# Patient Record
Sex: MALE | Race: WHITE | NOT HISPANIC OR LATINO | Employment: OTHER
[De-identification: names, ages, dates, MRNs, and addresses within clinical notes are randomized per-mention and may not be internally consistent; named-entity substitution may affect disease eponyms.]

---

## 2023-06-20 ENCOUNTER — TRANSCRIBE ORDERS (OUTPATIENT)
Dept: OTHER | Age: 73
End: 2023-06-20

## 2023-06-20 DIAGNOSIS — C15.4 MALIGNANT NEOPLASM OF MIDDLE THIRD OF ESOPHAGUS (H): Primary | ICD-10-CM

## 2023-06-21 ENCOUNTER — PATIENT OUTREACH (OUTPATIENT)
Dept: SURGERY | Facility: CLINIC | Age: 73
End: 2023-06-21

## 2023-06-21 NOTE — PROGRESS NOTES
Review of Thoracic Surgery referral from  Onc Dr Gutierrez for pt with esophageal cancer.    Hx of esophageal adenocarcinoma on EGD at  5/17/23. Staging completed (PET 6/1, EUS 6/6); pt has met w/ Med Onc, CTS at .  team sending to Thoracic Surg at [facility] for 2nd op.    Dr King would like to add on this pt tomorrow 6/22/23; MR team to push images, request slides,etc.      Catrachito Daley RN  Oncology Nurse Navigator  [facility]  [phone]

## 2023-06-21 NOTE — TELEPHONE ENCOUNTER
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 Need or order pathology

## 2023-06-22 ENCOUNTER — ONCOLOGY VISIT (OUTPATIENT)
Dept: SURGERY | Facility: CLINIC | Age: 73
End: 2023-06-22
Attending: STUDENT IN AN ORGANIZED HEALTH CARE EDUCATION/TRAINING PROGRAM
Payer: COMMERCIAL

## 2023-06-22 ENCOUNTER — PRE VISIT (OUTPATIENT)
Dept: SURGERY | Facility: CLINIC | Age: 73
End: 2023-06-22
Payer: COMMERCIAL

## 2023-06-22 VITALS
HEIGHT: 72 IN | TEMPERATURE: 98.3 F | RESPIRATION RATE: 16 BRPM | SYSTOLIC BLOOD PRESSURE: 133 MMHG | DIASTOLIC BLOOD PRESSURE: 80 MMHG | HEART RATE: 60 BPM | OXYGEN SATURATION: 99 % | WEIGHT: 243.6 LBS | BODY MASS INDEX: 33 KG/M2

## 2023-06-22 DIAGNOSIS — C15.4 MALIGNANT NEOPLASM OF MIDDLE THIRD OF ESOPHAGUS (H): ICD-10-CM

## 2023-06-22 PROCEDURE — G0463 HOSPITAL OUTPT CLINIC VISIT: HCPCS | Performed by: STUDENT IN AN ORGANIZED HEALTH CARE EDUCATION/TRAINING PROGRAM

## 2023-06-22 PROCEDURE — 99205 OFFICE O/P NEW HI 60 MIN: CPT | Performed by: STUDENT IN AN ORGANIZED HEALTH CARE EDUCATION/TRAINING PROGRAM

## 2023-06-22 RX ORDER — SIMVASTATIN 80 MG
80 TABLET ORAL AT BEDTIME
COMMUNITY
Start: 2023-06-08

## 2023-06-22 RX ORDER — MULTIPLE VITAMINS W/ MINERALS TAB 9MG-400MCG
1 TAB ORAL EVERY EVENING
Status: ON HOLD | COMMUNITY
End: 2023-11-01

## 2023-06-22 RX ORDER — COVID-19 MOLECULAR TEST ASSAY
KIT MISCELLANEOUS
Status: ON HOLD | COMMUNITY
Start: 2022-11-14 | End: 2023-10-23

## 2023-06-22 RX ORDER — DOCUSATE SODIUM 250 MG
250 CAPSULE ORAL 2 TIMES DAILY
COMMUNITY

## 2023-06-22 RX ORDER — TRIAMCINOLONE ACETONIDE 1 MG/G
CREAM TOPICAL PRN
Status: ON HOLD | COMMUNITY
Start: 2023-02-16 | End: 2023-10-24

## 2023-06-22 ASSESSMENT — PAIN SCALES - GENERAL: PAINLEVEL: NO PAIN (0)

## 2023-06-22 NOTE — NURSING NOTE
"Oncology Rooming Note    June 22, 2023 10:01 AM   Alonso Rogel is a 72 year old male who presents for:    Chief Complaint   Patient presents with     Oncology Clinic Visit     New patient - malignant neoplasm of middle third of esophagus, referral from Dr. Gutierrez.      Esophageal Cancer     Initial Vitals: /80 (BP Location: Right arm, Patient Position: Sitting, Cuff Size: Adult Regular)   Pulse 60   Temp 98.3  F (36.8  C) (Oral)   Resp 16   Ht 1.825 m (5' 11.85\")   Wt 110.5 kg (243 lb 9.6 oz)   SpO2 99%   BMI 33.18 kg/m   Estimated body mass index is 33.18 kg/m  as calculated from the following:    Height as of this encounter: 1.825 m (5' 11.85\").    Weight as of this encounter: 110.5 kg (243 lb 9.6 oz). Body surface area is 2.37 meters squared.  No Pain (0) Comment: Data Unavailable   No LMP for male patient.  Allergies reviewed: Yes  Medications reviewed: Yes    Medications: Medication refills not needed today.  Pharmacy name entered into [employer]: [id number] - [place], [place] - [address] [address] AT [facility] OF  [address] [place] [address]    Clinical concerns:       Talat Crouch            "

## 2023-06-22 NOTE — PROGRESS NOTES
THORACIC SURGERY - NEW PATIENT OFFICE VISIT          I saw Alonso Rogel at in consultation for the evaluation and treatment of a newly diagnosed esophageal cancer.     HPI  Alonso Rogel is a 72 year old male who was recently diagnosed with esophageal cancer when he presented with food impaction.  He has lost a few pounds but is tolerating solid food and has been quite active.    Previsit Tests   2023: EGD: Partially obstructing, likely malignant esophageal tumor (vs. inflamed stricture, less likely was found in the middle third of the esophagus. Biopsied.- A medium amount of food (residue)  in the stomach - Normal examined duodenum.  2023: Pathology: Esophagus, biopsy: Moderate to poorly differentiated adenocarcinoma: HER2 fish negative.    2023: PET imagin. Hypermetabolic activity within the mid esophagus compatible with known malignancy.2. No convincing scintigraphic evidence for metastatic disease.3. Note is made of 2 subcentimeter anterior paraesophageal lymph node superior to the area of hypermetabolic activity which are slightly more prominent compared to prior CT but are not frankly hypermetabolic  2023: Endoscopic ultrasound: Middle 3rd esophageal cancer, T3N1/N0.  Unable to biopsy since needed we will need to passed through the tumor  PMH  Reviewed, as below    Ca prostate  Ca thyroid      PSH  Reviewed, as below       ETOH beer on most days (1)  TOB denies     Physical examination  BMI 33      From a personal perspective, he is here with his wife    IMPRESSION (C15.4) Malignant neoplasm of middle third of esophagus (H)  Comment:   Plan:      This person is a 72 year old male with Siewert I esopahgeal ca T3N1M0     PLAN  I spent 60 min on the date of the encounter in chart review, patient visit, review of tests, documentation and/or discussion with other providers about the issues documented above. I reviewed the plan as follows:  We discussed the role of trimodality  treatment for esophageal cancer  Idiscussed  A minimally invasive esopagectomy (gabriel-Reagan) possible 3 hole (in case proximal margins were close). However, based on his scan, hernia and measurements, an Gabriel-Reagan esophagectomy will likely be appropriate for him.  We discussed a staged procedure with diagnostic lap, J tube and egd 3 weeks prior to the esophagectomy    If he decided to pursue care here, I will see him in 1 month after chemoXRT with a new PET scan.    We will also discuss him at our tumor board      I appreciate the opportunity to participate in the care of your patient and will keep you updated.  Sincerely,    Jennifer King MD

## 2023-06-22 NOTE — LETTER
2023         RE: Alonso Rogel  [address] [address] [address]        Dear Colleague,    Thank you for referring your patient, Alonso Rogel, to the [facility]. Please see a copy of my visit note below.    THORACIC SURGERY - NEW PATIENT OFFICE VISIT          I saw Alonso Rogel at in consultation for the evaluation and treatment of a newly diagnosed esophageal cancer.     HPI  Alonso Rogel is a 72 year old male who was recently diagnosed with esophageal cancer when he presented with food impaction.  He has lost a few pounds but is tolerating solid food and has been quite active.    Previsit Tests   2023: EGD: Partially obstructing, likely malignant esophageal tumor (vs. inflamed stricture, less likely was found in the middle third of the esophagus. Biopsied.- A medium amount of food (residue)  in the stomach - Normal examined duodenum.  2023: Pathology: Esophagus, biopsy: Moderate to poorly differentiated adenocarcinoma: HER2 fish negative.    2023: PET imagin. Hypermetabolic activity within the mid esophagus compatible with known malignancy.2. No convincing scintigraphic evidence for metastatic disease.3. Note is made of 2 subcentimeter anterior paraesophageal lymph node superior to the area of hypermetabolic activity which are slightly more prominent compared to prior CT but are not frankly hypermetabolic  2023: Endoscopic ultrasound: Middle 3rd esophageal cancer, T3N1/N0.  Unable to biopsy since needed we will need to passed through the tumor  PMH  Reviewed, as below    Ca prostate  Ca thyroid      PSH  Reviewed, as below       ETOH beer on most days (1)  TOB denies     Physical examination  BMI 33      From a personal perspective, he is here with his wife    IMPRESSION (C15.4) Malignant neoplasm of middle third of esophagus (H)  Comment:   Plan:      This person is a 72 year old male with Siewert I esopahgeal ca T3N1M0     PLAN  I  spent 60 min on the date of the encounter in chart review, patient visit, review of tests, documentation and/or discussion with other providers about the issues documented above. I reviewed the plan as follows:  We discussed the role of trimodality treatment for esophageal cancer  Idiscussed  A minimally invasive esopagectomy (gabriel-Reagan) possible 3 hole (in case proximal margins were close). However, based on his scan, hernia and measurements, an Gabriel-Reagan esophagectomy will likely be appropriate for him.  We discussed a staged procedure with diagnostic lap, J tube and egd 3 weeks prior to the esophagectomy    If he decided to pursue care here, I will see him in 1 month after chemoXRT with a new PET scan.    We will also discuss him at our tumor board      I appreciate the opportunity to participate in the care of your patient and will keep you updated.  Sincerely,    Jennifer King MD

## 2023-06-23 ENCOUNTER — DOCUMENTATION ONLY (OUTPATIENT)
Dept: SURGERY | Facility: CLINIC | Age: 73
End: 2023-06-23
Payer: COMMERCIAL

## 2023-06-28 NOTE — PROGRESS NOTES
Spoke with RNCC from Dr. Gutierrez's office who will update her on plan and schedule PET at [facility] for the first week of September. Verified that treatment is scheduled to be finished on 8/4. She will request that images be automatically pushed into PACS.     Jessica Strickland RN BSN  Thoracic Surgery RN Care Coordinator  [phone]

## 2023-08-13 ENCOUNTER — HEALTH MAINTENANCE LETTER (OUTPATIENT)
Age: 73
End: 2023-08-13

## 2023-09-18 ENCOUNTER — ONCOLOGY VISIT (OUTPATIENT)
Dept: SURGERY | Facility: CLINIC | Age: 73
End: 2023-09-18
Attending: STUDENT IN AN ORGANIZED HEALTH CARE EDUCATION/TRAINING PROGRAM
Payer: COMMERCIAL

## 2023-09-18 VITALS
DIASTOLIC BLOOD PRESSURE: 77 MMHG | RESPIRATION RATE: 16 BRPM | TEMPERATURE: 98.5 F | SYSTOLIC BLOOD PRESSURE: 144 MMHG | OXYGEN SATURATION: 99 % | BODY MASS INDEX: 32.17 KG/M2 | WEIGHT: 236.2 LBS | HEART RATE: 63 BPM

## 2023-09-18 DIAGNOSIS — C15.4 MALIGNANT NEOPLASM OF MIDDLE THIRD OF ESOPHAGUS (H): Primary | ICD-10-CM

## 2023-09-18 PROCEDURE — G0463 HOSPITAL OUTPT CLINIC VISIT: HCPCS | Performed by: STUDENT IN AN ORGANIZED HEALTH CARE EDUCATION/TRAINING PROGRAM

## 2023-09-18 PROCEDURE — 99215 OFFICE O/P EST HI 40 MIN: CPT | Performed by: STUDENT IN AN ORGANIZED HEALTH CARE EDUCATION/TRAINING PROGRAM

## 2023-09-18 ASSESSMENT — PAIN SCALES - GENERAL: PAINLEVEL: NO PAIN (0)

## 2023-09-18 NOTE — PROGRESS NOTES
THORACIC SURGERY - FOLLOW UP  OFFICE VISIT        I saw Alonso Rogel at in consultation for the evaluation and treatment of esophageal cancer.     HPI  Alonso Rogel is a 72 year old male who was diagnosed with esophageal cancer in [place] when he presented with food impaction. He returns today after chemoRT (carboplatin, paclitaxel, external beam radiotherapy to the esophagus delivering 4140 cGy at 180 cGy per fraction over 23 fractions) and with a new PET scan. (Completed 23)  Reports he had minimal issues with his chemo and radiation. Had no nausea, hair loss, or further weight loss (only had 20 lbs total but none in the last few weeks). Has been eating and drinking okay, denies dysphagia. His main symptom is increased fatigue by the end of the day but able to still stay active.   Previsit Tests   2023: EGD: Partially obstructing, likely malignant esophageal tumor (vs. inflamed stricture, less likely was found in the middle third of the esophagus. Biopsied.- A medium amount of food (residue)  in the stomach - Normal examined duodenum.  2023: Pathology: Esophagus, biopsy: Moderate to poorly differentiated adenocarcinoma: HER2 fish negative.  2023: PET imagin. Hypermetabolic activity within the mid esophagus compatible with known malignancy.2. No convincing scintigraphic evidence for metastatic disease.3. Note is made of 2 subcentimeter anterior paraesophageal lymph node superior to the area of hypermetabolic activity which are slightly more prominent compared to prior CT but are not frankly hypermetabolic  2023: Endoscopic ultrasound: Middle 3rd esophageal cancer, T3N1/N0.  Unable to biopsy since needed we will need to passed through the tumor  23 PET imaging: stable PET without major progression of disease, current SUV max 10.7 but previously 10.2.     [facility]  Reviewed, as below    Dyslipidemia  Papillary thyroid carcinoma 2022  Prostate cancer  2013  Dyslipidemia  Sensorineural hearing loss   Neuropathy pain      PSH  Appendectomy, hemithyroidectomy, prostate biopsy, robotic prostatectomy.     SH  Reviewed, as below       ETOH beer on most days (1)  TOB denies     Physical examination  BMI 33      From a personal perspective, he is here with his wife    IMPRESSION (C15.4) Malignant neoplasm of middle third of esophagus (H)  Comment:   Plan:      This person is a 72 year old male with Siewert I esopahgeal ca T3N1M0     PLAN  I spent 60 min on the date of the encounter in chart review, patient visit, review of tests, documentation and/or discussion with other providers about the issues documented above. I reviewed the plan as follows:  Plan for staged procedure with diagnostic lap, J tube and egd first followed by minimally invasive esophagectomy (ideally [place]-Reagan, however possibility for 3 hole if proximal margins close/Araiza's present). He would like to proceed with this as soon as possible; will work on getting scheduled at least 6-8 weeks post chemo/radiation.   We discussed that the PET activity could mean reaction vs residual disease. The diagonostic lap and another PET closer to the esophagectomy will   Procedure plan: I reviewed the indications, risks, and benefits of the procedure with Mr. Alonso Rogel . We discussed the intraoperative risks of bleeding, injury to vital organs, and potential for esophageal discontinuity.  Potential postoperative complications include, but are not limited to, major respiratory events, arrhythmia, bleeding, infection, reoperation, anastomotic leak, conduit necrosis with discontinuity, vocal cord palsy, and death. I explained the anticipated hospital course (10-14 days) and postoperative recovery including pain control, tube feedings, dietary changes, and overall drain management. We discussed the importance of lifetime awareness to limit lifting heavy weights to prevent hiatal herniation as well as the need for  aspiration precautions.    1. Necessary Tests & Appointments:  PAC, PFTs  2. Pain Control Plan: local at port sites and intercostal block  3. Anticoagulation Plan: Lovenox 40mg in preop  4. Smoking Cessation: nonsmoker    All questions were answered and the patient and present family were in agreement with the plan.  I appreciate the opportunity to participate in the care of your patient and will keep you updated.  Sincerely,

## 2023-09-18 NOTE — NURSING NOTE
"Oncology Rooming Note    September 18, 2023 2:03 PM   Alonso Rogel is a 72 year old male who presents for:    Chief Complaint   Patient presents with    Oncology Clinic Visit     Esophageal Cancer     Initial Vitals: BP (!) 144/77 (BP Location: Right arm, Patient Position: Sitting, Cuff Size: Adult Large)   Pulse 63   Temp 98.5  F (36.9  C) (Oral)   Resp 16   Wt 107.1 kg (236 lb 3.2 oz)   SpO2 99%   BMI 32.17 kg/m   Estimated body mass index is 32.17 kg/m  as calculated from the following:    Height as of 6/22/23: 1.825 m (5' 11.85\").    Weight as of this encounter: 107.1 kg (236 lb 3.2 oz). Body surface area is 2.33 meters squared.  No Pain (0) Comment: Data Unavailable   No LMP for male patient.  Allergies reviewed: Yes  Medications reviewed: Yes    Medications: Medication refills not needed today.  Pharmacy name entered into [employer]: [id number] - [place], [place] - [address] AT [facility] OF [facility] [address]    Clinical concerns: PET Scan review       Cordelia Couch LPN  9/18/2023              "

## 2023-09-18 NOTE — LETTER
2023         RE: Alonso Rogel  [address] [address] [address]        Dear Colleague,    Thank you for referring your patient, Alonso Rogel, to the [facility]. Please see a copy of my visit note below.    THORACIC SURGERY - FOLLOW UP  OFFICE VISIT        I saw Alonso Rogel at in consultation for the evaluation and treatment of esophageal cancer.     HPI  Alonso Rogel is a 72 year old male who was diagnosed with esophageal cancer in [place] when he presented with food impaction. He returns today after chemoRT (carboplatin, paclitaxel, external beam radiotherapy to the esophagus delivering 4140 cGy at 180 cGy per fraction over 23 fractions) and with a new PET scan. (Completed 23)  Reports he had minimal issues with his chemo and radiation. Had no nausea, hair loss, or further weight loss (only had 20 lbs total but none in the last few weeks). Has been eating and drinking okay, denies dysphagia. His main symptom is increased fatigue by the end of the day but able to still stay active.   Previsit Tests   2023: EGD: Partially obstructing, likely malignant esophageal tumor (vs. inflamed stricture, less likely was found in the middle third of the esophagus. Biopsied.- A medium amount of food (residue)  in the stomach - Normal examined duodenum.  2023: Pathology: Esophagus, biopsy: Moderate to poorly differentiated adenocarcinoma: HER2 fish negative.  2023: PET imagin. Hypermetabolic activity within the mid esophagus compatible with known malignancy.2. No convincing scintigraphic evidence for metastatic disease.3. Note is made of 2 subcentimeter anterior paraesophageal lymph node superior to the area of hypermetabolic activity which are slightly more prominent compared to prior CT but are not frankly hypermetabolic  2023: Endoscopic ultrasound: Middle 3rd esophageal cancer, T3N1/N0.  Unable to biopsy since needed we will need to passed  through the tumor  9/5/23 PET imaging: stable PET without major progression of disease, current SUV max 10.7 but previously 10.2.     PMH  Reviewed, as below    Dyslipidemia  Papillary thyroid carcinoma 2022 November  Prostate cancer 2013  Dyslipidemia  Sensorineural hearing loss   Neuropathy pain      PSH  Appendectomy, hemithyroidectomy, prostate biopsy, robotic prostatectomy.     SH  Reviewed, as below       ETOH beer on most days (1)  TOB denies     Physical examination  BMI 33      From a personal perspective, he is here with his wife    IMPRESSION (C15.4) Malignant neoplasm of middle third of esophagus (H)  Comment:   Plan:      This person is a 72 year old male with Siewert I esopahgeal ca T3N1M0     PLAN  I spent 60 min on the date of the encounter in chart review, patient visit, review of tests, documentation and/or discussion with other providers about the issues documented above. I reviewed the plan as follows:  Plan for staged procedure with diagnostic lap, J tube and egd first followed by minimally invasive esophagectomy (ideally [place]-Reagan, however possibility for 3 hole if proximal margins close/Araiza's present). He would like to proceed with this as soon as possible; will work on getting scheduled at least 6-8 weeks post chemo/radiation.   We discussed that the PET activity could mean reaction vs residual disease. The diagonostic lap and another PET closer to the esophagectomy will   Procedure plan: I reviewed the indications, risks, and benefits of the procedure with Mr. Alonso Rogel . We discussed the intraoperative risks of bleeding, injury to vital organs, and potential for esophageal discontinuity.  Potential postoperative complications include, but are not limited to, major respiratory events, arrhythmia, bleeding, infection, reoperation, anastomotic leak, conduit necrosis with discontinuity, vocal cord palsy, and death. I explained the anticipated hospital course (10-14 days) and  postoperative recovery including pain control, tube feedings, dietary changes, and overall drain management. We discussed the importance of lifetime awareness to limit lifting heavy weights to prevent hiatal herniation as well as the need for aspiration precautions.    1. Necessary Tests & Appointments:  PAC, PFTs  2. Pain Control Plan: local at port sites and intercostal block  3. Anticoagulation Plan: Lovenox 40mg in preop  4. Smoking Cessation: nonsmoker    All questions were answered and the patient and present family were in agreement with the plan.  I appreciate the opportunity to participate in the care of your patient and will keep you updated.  Sincerely,    Jennfier King MD

## 2023-09-19 DIAGNOSIS — C15.4 MALIGNANT NEOPLASM OF MIDDLE THIRD OF ESOPHAGUS (H): Primary | ICD-10-CM

## 2023-09-20 ENCOUNTER — TELEPHONE (OUTPATIENT)
Dept: SURGERY | Facility: CLINIC | Age: 73
End: 2023-09-20
Payer: COMMERCIAL

## 2023-09-20 NOTE — TELEPHONE ENCOUNTER
Pt called with questions about insurance coverage for esophagectomy. Phone number give to [place] [facility] care center. Advised pt to wait until procedure is scheduled, then follow up with them for financial estimates.    Pt asking if it's ok to get flu and Covid vaccines, advised that standard practice is to wait two weeks before and after surgery for any vaccine.     Jessica Strickland, RN BSN  Thoracic Surgery RN Care Coordinator  [phone]

## 2023-09-21 ENCOUNTER — TELEPHONE (OUTPATIENT)
Dept: SURGERY | Facility: CLINIC | Age: 73
End: 2023-09-21
Payer: COMMERCIAL

## 2023-09-21 ENCOUNTER — PREP FOR PROCEDURE (OUTPATIENT)
Dept: SURGERY | Facility: CLINIC | Age: 73
End: 2023-09-21
Payer: COMMERCIAL

## 2023-09-21 NOTE — TELEPHONE ENCOUNTER
Spoke with patient to schedule procedure with Dr. sylvester   Procedure was scheduled on 10/2 & 10/23 at [facility] OR  Patient will have H&P with PAC    Patient is aware a COVID-19 test is needed before their procedure ONLY IF symptomatic.   (Patient is aware Thoracic is no longer requiring COVID-19 test)       Patient is aware a / is needed day of surgery.   Surgery Letter was sent via [employer],     Patient has my direct contact information for any further questions.

## 2023-09-22 NOTE — TELEPHONE ENCOUNTER
FUTURE VISIT INFORMATION        SURGERY INFORMATION:  Date: 10/2/23  Location: [facility] or  Surgeon:  Jennifer King MD   Anesthesia Type:  general  Procedure: Diagnostic Laparoscopy Esophagoscopy, gastroscopy, duodenoscopy (EGD) INSERTION, JEJUNOSTOMY TUBE, LAPAROSCOPY-ASSISTED   Consult: ov 23     RECORDS REQUESTED FROM:         Primary Care Provider: Say Rodriguez MD  - [facility]     Most recent EKG+ Tracin23- [facility]

## 2023-09-22 NOTE — TELEPHONE ENCOUNTER
FUTURE VISIT INFORMATION      SURGERY INFORMATION:  Date: 10/2/23  Location: [facility] or  Surgeon:  Jennifer King MD   Anesthesia Type:  general  Procedure: Diagnostic Laparoscopy Esophagoscopy, gastroscopy, duodenoscopy (EGD) INSERTION, JEJUNOSTOMY TUBE, LAPAROSCOPY-ASSISTED   Consult: ov 23    RECORDS REQUESTED FROM:       Primary Care Provider: Say Rodriguez MD  - [facility]    Most recent EKG+ Tracin23- [facility]

## 2023-09-25 ENCOUNTER — MYC MEDICAL ADVICE (OUTPATIENT)
Dept: SURGERY | Facility: CLINIC | Age: 73
End: 2023-09-25
Payer: COMMERCIAL

## 2023-09-25 DIAGNOSIS — C15.9 MALIGNANT NEOPLASM OF ESOPHAGUS, UNSPECIFIED LOCATION (H): Primary | ICD-10-CM

## 2023-09-28 ENCOUNTER — OFFICE VISIT (OUTPATIENT)
Dept: EDUCATION SERVICES | Facility: CLINIC | Age: 73
End: 2023-09-28
Attending: CLINICAL NURSE SPECIALIST
Payer: COMMERCIAL

## 2023-09-28 ENCOUNTER — OFFICE VISIT (OUTPATIENT)
Dept: SURGERY | Facility: CLINIC | Age: 73
End: 2023-09-28
Payer: COMMERCIAL

## 2023-09-28 ENCOUNTER — OFFICE VISIT (OUTPATIENT)
Dept: PULMONOLOGY | Facility: CLINIC | Age: 73
End: 2023-09-28
Attending: STUDENT IN AN ORGANIZED HEALTH CARE EDUCATION/TRAINING PROGRAM
Payer: COMMERCIAL

## 2023-09-28 ENCOUNTER — PRE VISIT (OUTPATIENT)
Dept: SURGERY | Facility: CLINIC | Age: 73
End: 2023-09-28

## 2023-09-28 ENCOUNTER — ANESTHESIA EVENT (OUTPATIENT)
Dept: SURGERY | Facility: CLINIC | Age: 73
End: 2023-09-28
Payer: COMMERCIAL

## 2023-09-28 VITALS
TEMPERATURE: 97.5 F | BODY MASS INDEX: 32.06 KG/M2 | RESPIRATION RATE: 16 BRPM | SYSTOLIC BLOOD PRESSURE: 129 MMHG | OXYGEN SATURATION: 100 % | HEIGHT: 72 IN | DIASTOLIC BLOOD PRESSURE: 87 MMHG | WEIGHT: 236.7 LBS | HEART RATE: 62 BPM

## 2023-09-28 DIAGNOSIS — Z01.818 PRE-OP EVALUATION: Primary | ICD-10-CM

## 2023-09-28 DIAGNOSIS — C15.9 MALIGNANT NEOPLASM OF ESOPHAGUS, UNSPECIFIED LOCATION (H): ICD-10-CM

## 2023-09-28 DIAGNOSIS — C15.4 MALIGNANT NEOPLASM OF MIDDLE THIRD OF ESOPHAGUS (H): ICD-10-CM

## 2023-09-28 PROCEDURE — 94375 RESPIRATORY FLOW VOLUME LOOP: CPT | Performed by: INTERNAL MEDICINE

## 2023-09-28 PROCEDURE — 99204 OFFICE O/P NEW MOD 45 MIN: CPT | Performed by: PHYSICIAN ASSISTANT

## 2023-09-28 PROCEDURE — 94729 DIFFUSING CAPACITY: CPT | Performed by: INTERNAL MEDICINE

## 2023-09-28 PROCEDURE — 94726 PLETHYSMOGRAPHY LUNG VOLUMES: CPT | Performed by: INTERNAL MEDICINE

## 2023-09-28 RX ORDER — SENNOSIDES 8.6 MG
1 TABLET ORAL 2 TIMES DAILY
Status: ON HOLD | COMMUNITY
End: 2023-10-24

## 2023-09-28 ASSESSMENT — LIFESTYLE VARIABLES: TOBACCO_USE: 0

## 2023-09-28 ASSESSMENT — ENCOUNTER SYMPTOMS: SEIZURES: 0

## 2023-09-28 ASSESSMENT — PAIN SCALES - GENERAL: PAINLEVEL: NO PAIN (0)

## 2023-09-28 ASSESSMENT — COPD QUESTIONNAIRES: COPD: 0

## 2023-09-28 NOTE — H&P
Pre-Operative H & P     CC:  Preoperative exam to assess for increased cardiopulmonary risk while undergoing surgery and anesthesia.    Date of Encounter: 9/28/2023  Primary Care Physician:  Say Rodriguez     Reason for visit:   Encounter Diagnoses   Name Primary?    Pre-op evaluation Yes    Malignant neoplasm of esophagus, unspecified location (H)        HPI  Alonso Rogel is a 72 year old male who presents for pre-operative H & P in preparation for  Procedure Information       Case: [id number] Date/Time: 10/02/23 1040    Procedures:       Diagnostic Laparoscopy (Chest)      Esophagoscopy, gastroscopy, duodenoscopy (EGD) (Esophagus)      INSERTION, JEJUNOSTOMY TUBE, LAPAROSCOPY-ASSISTED (Abdomen)    Anesthesia type: General    Diagnosis: Malignant neoplasm of esophagus, unspecified location (H) [C15.9]    Pre-op diagnosis: Malignant neoplasm of esophagus, unspecified location (H) [C15.9]    [facility]: [facility]    Providers: Jennifer King MD            Patient is being evaluated for comorbid conditions of hyperlipidemia, anemia, SNHL, neuropathy, history of thyroid cancer, history of prostate cancer.    He was diagnosed with esophageal cancer in May after presenting with food impaction. He is s/p chemoradiation and was recently seen in consultation with Dr. King on 9/18/2023 to discuss surgical treatment options. A staged approach was recommended and he is now scheduled for the first stage as above.     Of note, he is scheduled for minimally invasive esophagectomy later in October. He does have an additional PAC appointment prior to that surgery as well.     History is obtained from the patient and chart review    Hx of abnormal bleeding or anti-platelet use: None      Past Medical History  Past Medical History:   Diagnosis Date    Anemia     Esophageal cancer (H)     History of prostate cancer     History of thyroid cancer     HLD (hyperlipidemia)     Neuropathy     SNHL (sensorineural hearing loss)   "      Past Surgical History  Past Surgical History:   Procedure Laterality Date    APPENDECTOMY      CRANIOTOMY      PROSTATECTOMY      THYROIDECTOMY, PARTIAL Left        Prior to Admission Medications  Current Outpatient Medications   Medication Sig Dispense Refill    docusate sodium (COLACE) 250 MG capsule Take 250 mg by mouth 2 times daily      multivitamin w/minerals (THERA-VIT-M) tablet Take 1 tablet by mouth every evening      sennosides (SENOKOT) 8.6 MG tablet Take 1 tablet by mouth 2 times daily      simvastatin (ZOCOR) 80 MG tablet Take 80 mg by mouth At Bedtime      triamcinolone (KENALOG) 0.1 % external cream Apply topically as needed      BINAXNOW COVID-19 AG HOME TEST KIT TEST AS DIRECTED TODAY         Allergies  Allergies   Allergen Reactions    Penicillins Rash     PN: LW Reaction: Reaction as a child        Social History  Social History     Socioeconomic History    Marital status:      Spouse name: Not on file    Number of children: Not on file    Years of education: Not on file    Highest education level: Not on file   Occupational History    Not on file   Tobacco Use    Smoking status: Never     Passive exposure: Never    Smokeless tobacco: Never   Substance and Sexual Activity    Alcohol use: Not Currently     Alcohol/week: 5.0 - 6.0 standard drinks of alcohol     Types: 5 - 6 Standard drinks or equivalent per week     Comment: None since 6/23    Drug use: Never    Sexual activity: Not on file   Other Topics Concern    Not on file   Social History Narrative    Not on file     Social Determinants of Health     Financial Resource Strain: Not on file   Food Insecurity: Not on file   Transportation Needs: Not on file   Physical Activity: Not on file   Stress: Not on file   Social Connections: Not on file   Interpersonal Safety: Not on file   Housing Stability: Not on file       Family History  Family History   Problem Relation Age of Onset    Other - See Comments Mother         \"difficulty " "with opioids\" - severe nausea    Breast Cancer Mother     Myocardial Infarction Father 60    Kidney failure Father 66    Prostate Cancer Father 66    Breast Cancer Sister     Anesthesia Reaction No family hx of     Venous thrombosis No family hx of        Review of Systems  The complete review of systems is negative other than noted in the HPI or here.   Anesthesia Evaluation   Pt has had prior anesthetic.     History of anesthetic complications  - difficult airway.      ROS/MED HX  ENT/Pulmonary: Comment: SNHL   (-) tobacco use, asthma and COPD   Neurologic:     (+)    peripheral neuropathy, - mild, right hand.                        (-) no seizures, no CVA and no TIA   Cardiovascular: Comment: Denies chest pain/pressure, CLIFTON, orthopnea, dizziness, palpitations, edema.      (+) Dyslipidemia - -   -  - -                                 Previous cardiac testing   Echo: Date: Results:    Stress Test:  Date: 2002 Results:  CONCLUSION:    1.  Normal exercise echocardiogram with adequate heart rate and workload.   2.  No evidence for inducible ischemia.   3.  Image quality was poor.   4.  This test has diminished sensitivity due to poor imaging quality.     ECG Reviewed:  Date: 5/17/2022 Results:  Sinus rhythm   Normal ECG   When compared with ECG of 16-NOV-2022 16:16,   Premature ventricular complexes are no longer Present   Cath:  Date: Results:      METS/Exercise Tolerance: >4 METS Comment: Walks daily, cares for [place] and does all yard work.    Hematologic:     (+)      anemia,       (-) history of blood clots and history of blood transfusion   Musculoskeletal:  - neg musculoskeletal ROS     GI/Hepatic:     (+)   esophageal disease,              (-) GERD and liver disease   Renal/Genitourinary:  - neg Renal ROS     Endo: Comment: S/p partial thyroidectomy    (+)               Obesity,    (-) Type II DM   Psychiatric/Substance Use:  - neg psychiatric ROS     Infectious Disease:  - neg infectious disease ROS   " "  Malignancy:   (+) Malignancy, History of GI, Prostate and Other.Prostate CA Remission status post Surgery.  GI CA  Active status post.  Other CA Thyroid cancer Remission status post Surgery.    Other:  - neg other ROS          /87 (BP Location: Right arm, Patient Position: Sitting, Cuff Size: Adult Regular)   Pulse 62   Temp 97.5  F (36.4  C) (Oral)   Resp 16   Ht 1.825 m (5' 11.85\")   Wt 107.4 kg (236 lb 11.2 oz)   SpO2 100%   BMI 32.24 kg/m      Physical Exam   Constitutional: Pleasant, well-appearing male, no apparent distress, and appears stated age.  Eyes: Pupils equal, round and reactive to light, extra ocular muscles intact, sclera clear, conjunctiva normal.  HENT: Normocephalic and atraumatic, oral pharynx with moist mucus membranes, good dentition. No goiter appreciated.   Respiratory: Clear to auscultation bilaterally, no crackles or wheezing.  Cardiovascular: Regular rate and rhythm, normal S1 and S2, and no murmur noted.  Carotids +2, no bruits. No edema. Palpable pulses to radial  DP and PT arteries.   GI: Normal bowel sounds, soft, non-distended, non-tender, no masses palpated, no hepatosplenomegaly.  Lymph/Hematologic: No cervical lymphadenopathy and no supraclavicular lymphadenopathy.  Genitourinary:  Deferred  Skin: Warm and dry.  No rashes on exposed skin   Musculoskeletal: Full ROM of neck. There is no redness, warmth, or swelling of the visible joints. Gross motor strength is normal.    Neurologic: Awake, alert, oriented to name, place and time. Cranial nerves II-XII are grossly intact. Gait is normal.   Neuropsychiatric: Calm, cooperative. Normal affect.       Prior Labs/Diagnostic Studies   All labs and imaging personally reviewed     EKG/ stress test - if available please see in ROS above   No results found.    The patient's records and results personally reviewed by this provider.     Outside records reviewed from: Care Everywhere    LAB/DIAGNOSTIC STUDIES TODAY:  " None    Assessment  Alonso Rogel is a 72 year old male seen as a PAC referral for risk assessment and optimization for anesthesia.    Plan/Recommendations  Pt will be optimized for the proposed procedure.  See below for details on the assessment, risk, and preoperative recommendations    NEUROLOGY  - No history of TIA, CVA or seizure  - Neuropathy affecting right hand, mild    -Post Op delirium risk factors:  Age    ENT  - Patient has previous history of complicated airway.   Per chart review was glide grade 2 in 2013- was about 5kg heavier and did have full beard at that time. Last intubation 11/2022 without difficulty, glide scope 3 and fiberoptic bronchoscope used (see CareEverywhere for details).   Mallampati: I  TM: > 3    CARDIAC  - No history of CAD, Hypertension, and Afib  - Hyperlipidemia  Well controlled on home regimen  - Denies chest pain/pressure, CLIFTON, orthopnea, dizziness, palpitations, edema.    - METS (Metabolic Equivalents)  Patient performs 4 or more METS exercise without symptoms            Total Score: 0      RCRI-Very low risk: Class 1 0.4% complication rate            Total Score: 0        PULMONARY    ELIZABETH Low Risk            Total Score: 2    ELIZABETH: Over 50 ys old    ELIZABETH: Male      - Denies asthma or inhaler use  - Tobacco History    History   Smoking Status    Never   Smokeless Tobacco    Never       GI  - Esophageal cancer s/p chemoradiation. Staged surgical intervention planned. Stage 1 scheduled as above for dx lap, feeding tube placement. Stage 2, minimally invasive esophagectomy is scheduled for 10/23/23.       PONV Medium Risk  Total Score: 2           1 AN PONV: Patient is not a current smoker    1 AN PONV: Intended Post Op Opioids        /RENAL  - Baseline Creatinine  0.92  - History of prostate cancer s/p prostatectomy. PSA recently mildly elevated again, under surveillance.     ENDOCRINE    - BMI: Estimated body mass index is 32.24 kg/m  as calculated from the following:     "Height as of this encounter: 1.825 m (5' 11.85\").    Weight as of this encounter: 107.4 kg (236 lb 11.2 oz).  Obesity (BMI >30)  - No history of Diabetes Mellitus  - History of thyroid cancer s/p partial thyroidectomy    HEME  VTE High Risk 3%            Total Score: 8    VTE: Greater than 59 yrs old    VTE: Male    VTE: Current cancer      - No history of abnormal bleeding or antiplatelet use.  - Chronic  anemia  Recommend perioperative use of blood conservation techniques intraoperatively and close monitoring for postoperative bleeding.    Different anesthesia methods/types have been discussed with the patient, but they are aware that the final plan will be decided by the assigned anesthesia provider on the date of service.    The patient is optimized for their procedure. AVS with information on surgery time/arrival time, meds and NPO status given by nursing staff. No further diagnostic testing indicated.      On the day of service:     Prep time: 15 minutes  Visit time: 21 minutes  Documentation time: 15 minutes  ------------------------------------------  Total time: 51 minutes      Sonam Barron PA-C  Preoperative Assessment Center  [facility]  Clinic and Surgery Center  Phone: [phone]  Fax: [phone]    "

## 2023-09-28 NOTE — PATIENT INSTRUCTIONS
Preparing for Your Surgery      Name:  Alonso Rogel   MRN:  [id number]   :  1950   Today's Date:  2023       Arriving for surgery:  Surgery date:  10/2/23  Arrival time:  8:30 am  Surgery time: 10:40 am    Please come to:     Please come to:      [employer] [facility] [address]  [address] [place]  [address]      The [facility] Patient /Visitor Ramp is located at [address]. Patients and visitors who self-park will receive the reduced hospital parking rate. If the Patient /Visitor Ramp is full, please follow the signs to the  parking located at the main hospital entrance.     parking is available ( 24 hours/ 7 days a week)    Discounted parking pass options are available for patients and visitors. They can be purchased at the [employer] desk at the main hospital entrance.    -    Stop at the security desk and they will direct surgery patients to the 3rd floor Surgery Waiting Room. [phone]     -  If you are in need of directions, wheelchair or escort please stop at the Information/security desk in the lobby.       What can I eat or drink?  -  You may eat and drink normally up to 8 hours prior to arrival time. (Until 12:30 am on 10/2/23)  -  You may have clear liquids until 2 hours prior to arrival time. (Until 6:30 am on 10/2/23)    Examples of clear liquids:  Water  Clear broth  Juices (apple, white grape, white cranberry  and cider) without pulp  Noncarbonated, powder based beverages  (lemonade and Leif-Aid)  Sodas (Sprite, 7-Up, ginger ale and seltzer)  Coffee or tea (without milk or cream)  Gatorade    -  No Alcohol or cannabis products for at least 24 hours before surgery.     Which medicines can I take?      Hold Multivitamins for 7 days before surgery. Ok to start holding now    Hold Ibuprofen (Advil, Motrin) for 1 day(s) before surgery--unless otherwise directed by surgeon.  Hold Naproxen (Aleve)  for 4 days before surgery.    -  DO NOT take these medications the day of surgery:   Docusate (colace)   Senokot    Kenalog cream         How do I prepare myself?  - Please take 2 showers (one the night prior to surgery and one the morning of surgery) using Scrubcare or Hibiclens soap.    Use this soap only from the neck to your toes.     Leave the soap on your skin for one minute--then rinse thoroughly.      You may use your own shampoo and conditioner. No other hair products.   - Please remove all jewelry and body piercings.  - No lotions, deodorants or fragrance.  - Bring your ID and insurance card.    -If you have a Deep Brain Stimulator, Spinal Cord Stimulator, or any Neuro Stimulator device---you must bring the remote control to the hospital.      ALL PATIENTS GOING HOME THE SAME DAY OF SURGERY ARE REQUIRED TO HAVE A RESPONSIBLE ADULT TO DRIVE AND BE IN ATTENDANCE WITH THEM FOR 24 HOURS FOLLOWING SURGERY.    Covid testing policy as of 12/06/2022  Your surgeon will notify and schedule you for a COVID test if one is needed before surgery--please direct any questions or COVID symptoms to your surgeon      Questions or Concerns:    - For any questions regarding the day of surgery or your hospital stay, please contact the Pre Admission Nursing Office at [phone].       - If you have health changes between today and your surgery, please call your surgeon.       - For questions after surgery, please call your surgeons office.           Current Visitor Guidelines    You may have 2 visitors in the pre op area.    Visiting hours: 8 a.m. to 8:30 p.m.    You may have four visitors during your inpatient hospital stay.    Patients confirmed or suspected to have symptoms of COVID 19 or flu:     No visitors allowed for adult patients.   Children (under age 18) can have 1 named visitor.     People who are sick or showing symptoms of COVID 19 or flu:    Are not allowed to visit patients--we can only make exceptions in  special situations.       Please follow these guidelines for your visit:          Please maintain social distance          Masking is optional--however at times you may be asked to wear a mask for the safety of yourself and others     Clean your hands with alcohol hand . Do this when you arrive at and leave the building and patient room,    And again after you touch your mask or anything in the room.     Go directly to and from the room you are visiting.     Stay in the patient s room during your visit. Limit going to other places in the hospital as much as possible     Leave bags and jackets at home or in the car.     For everyone s health, please don t come and go during your visit. That includes for smoking   during your visit.

## 2023-09-28 NOTE — PROGRESS NOTES
Alonso Rogel comes into clinic today at the request of Dr. Jennifer King Ordering Provider for PFT        This service provided today was under the supervising provider of the day Dr. Jayna García, who was available if needed.    Solitario Means, RRT

## 2023-09-28 NOTE — PROGRESS NOTES
PLC teaching with patient and wife Anna Marie for J tube that will be placed 10/2.     We went over site care, dressing change and tube securement. Then practiced water flushing. Tube feeds will start later in October so we just did a basic intro into feeds via feeding pump since he will have a J tube.    Patient had many questions about being connected to a pump and the time commitment (24 hour continuous or a shortened cycle). He was picturing more of a bolus feed schedule and writer explained that with only a J tube, bolus feeds to get full nutritional needs is highly unlikely.    Many good questions asked to express their understanding of skills taught, even if it wasn't what they were picturing.    Let Alonso and Anna Marie know that the PLC will anticipate seeing patient again while inpatient for more in depth feeding education with plan details for home.    Literature given: Handwashing and Skin Care, Caring for Your Tube at Home.

## 2023-09-29 DIAGNOSIS — C15.9 MALIGNANT NEOPLASM OF ESOPHAGUS, UNSPECIFIED LOCATION (H): Primary | ICD-10-CM

## 2023-09-29 LAB
DLCOUNC-%PRED-PRE: 110 %
DLCOUNC-PRE: 29.43 ML/MIN/MMHG
DLCOUNC-PRED: 26.58 ML/MIN/MMHG
ERV-%PRED-PRE: 104 %
ERV-PRE: 1.59 L
ERV-PRED: 1.51 L
EXPTIME-PRE: 8.94 SEC
FEF2575-%PRED-PRE: 97 %
FEF2575-PRE: 2.16 L/SEC
FEF2575-PRED: 2.22 L/SEC
FEFMAX-%PRED-PRE: 111 %
FEFMAX-PRE: 9.21 L/SEC
FEFMAX-PRED: 8.29 L/SEC
FEV1-%PRED-PRE: 117 %
FEV1-PRE: 3.56 L
FEV1FEV6-PRE: 70 %
FEV1FEV6-PRED: 77 %
FEV1FVC-PRE: 67 %
FEV1FVC-PRED: 76 %
FEV1SVC-PRE: 67 %
FEV1SVC-PRED: 71 %
FIFMAX-PRE: 7.33 L/SEC
FRCPLETH-%PRED-PRE: 107 %
FRCPLETH-PRE: 4.15 L
FRCPLETH-PRED: 3.86 L
FVC-%PRED-PRE: 131 %
FVC-PRE: 5.28 L
FVC-PRED: 4.01 L
IC-%PRED-PRE: 122 %
IC-PRE: 3.71 L
IC-PRED: 3.02 L
RVPLETH-%PRED-PRE: 91 %
RVPLETH-PRE: 2.57 L
RVPLETH-PRED: 2.81 L
TLCPLETH-%PRED-PRE: 104 %
TLCPLETH-PRE: 7.86 L
TLCPLETH-PRED: 7.53 L
VA-%PRED-PRE: 103 %
VA-PRE: 7.06 L
VC-%PRED-PRE: 123 %
VC-PRE: 5.3 L
VC-PRED: 4.28 L

## 2023-10-02 ENCOUNTER — HOSPITAL ENCOUNTER (OUTPATIENT)
Facility: CLINIC | Age: 73
Discharge: HOME | End: 2023-10-02
Attending: STUDENT IN AN ORGANIZED HEALTH CARE EDUCATION/TRAINING PROGRAM | Admitting: STUDENT IN AN ORGANIZED HEALTH CARE EDUCATION/TRAINING PROGRAM
Payer: COMMERCIAL

## 2023-10-02 ENCOUNTER — ANESTHESIA (OUTPATIENT)
Dept: SURGERY | Facility: CLINIC | Age: 73
End: 2023-10-02
Payer: COMMERCIAL

## 2023-10-02 VITALS
HEART RATE: 70 BPM | TEMPERATURE: 97.9 F | DIASTOLIC BLOOD PRESSURE: 75 MMHG | RESPIRATION RATE: 16 BRPM | HEIGHT: 72 IN | WEIGHT: 233.91 LBS | BODY MASS INDEX: 31.68 KG/M2 | OXYGEN SATURATION: 100 % | SYSTOLIC BLOOD PRESSURE: 131 MMHG

## 2023-10-02 DIAGNOSIS — C15.9 MALIGNANT NEOPLASM OF ESOPHAGUS, UNSPECIFIED LOCATION (H): Primary | ICD-10-CM

## 2023-10-02 PROCEDURE — 49084 PERITONEAL LAVAGE: CPT | Mod: XU | Performed by: STUDENT IN AN ORGANIZED HEALTH CARE EDUCATION/TRAINING PROGRAM

## 2023-10-02 PROCEDURE — 250N000009 HC RX 250: Performed by: ANESTHESIOLOGY

## 2023-10-02 PROCEDURE — 272N000001 HC OR GENERAL SUPPLY STERILE: Performed by: STUDENT IN AN ORGANIZED HEALTH CARE EDUCATION/TRAINING PROGRAM

## 2023-10-02 PROCEDURE — 49321 LAPAROSCOPY BIOPSY: CPT | Mod: GC | Performed by: STUDENT IN AN ORGANIZED HEALTH CARE EDUCATION/TRAINING PROGRAM

## 2023-10-02 PROCEDURE — 88331 PATH CONSLTJ SURG 1 BLK 1SPC: CPT | Mod: 26 | Performed by: PATHOLOGY

## 2023-10-02 PROCEDURE — 999N000141 HC STATISTIC PRE-PROCEDURE NURSING ASSESSMENT: Performed by: STUDENT IN AN ORGANIZED HEALTH CARE EDUCATION/TRAINING PROGRAM

## 2023-10-02 PROCEDURE — 710N000012 HC RECOVERY PHASE 2, PER MINUTE: Performed by: STUDENT IN AN ORGANIZED HEALTH CARE EDUCATION/TRAINING PROGRAM

## 2023-10-02 PROCEDURE — 250N000011 HC RX IP 250 OP 636: Mod: JZ | Performed by: STUDENT IN AN ORGANIZED HEALTH CARE EDUCATION/TRAINING PROGRAM

## 2023-10-02 PROCEDURE — 360N000076 HC SURGERY LEVEL 3, PER MIN: Performed by: STUDENT IN AN ORGANIZED HEALTH CARE EDUCATION/TRAINING PROGRAM

## 2023-10-02 PROCEDURE — 250N000011 HC RX IP 250 OP 636: Mod: JZ | Performed by: ANESTHESIOLOGY

## 2023-10-02 PROCEDURE — 370N000017 HC ANESTHESIA TECHNICAL FEE, PER MIN: Performed by: STUDENT IN AN ORGANIZED HEALTH CARE EDUCATION/TRAINING PROGRAM

## 2023-10-02 PROCEDURE — 88331 PATH CONSLTJ SURG 1 BLK 1SPC: CPT | Mod: TC | Performed by: STUDENT IN AN ORGANIZED HEALTH CARE EDUCATION/TRAINING PROGRAM

## 2023-10-02 PROCEDURE — 88305 TISSUE EXAM BY PATHOLOGIST: CPT | Mod: 26 | Performed by: PATHOLOGY

## 2023-10-02 PROCEDURE — C1894 INTRO/SHEATH, NON-LASER: HCPCS | Performed by: STUDENT IN AN ORGANIZED HEALTH CARE EDUCATION/TRAINING PROGRAM

## 2023-10-02 PROCEDURE — C1769 GUIDE WIRE: HCPCS | Performed by: STUDENT IN AN ORGANIZED HEALTH CARE EDUCATION/TRAINING PROGRAM

## 2023-10-02 PROCEDURE — 250N000011 HC RX IP 250 OP 636: Performed by: ANESTHESIOLOGY

## 2023-10-02 PROCEDURE — 250N000009 HC RX 250: Performed by: STUDENT IN AN ORGANIZED HEALTH CARE EDUCATION/TRAINING PROGRAM

## 2023-10-02 PROCEDURE — 250N000025 HC SEVOFLURANE, PER MIN: Performed by: STUDENT IN AN ORGANIZED HEALTH CARE EDUCATION/TRAINING PROGRAM

## 2023-10-02 PROCEDURE — 250N000009 HC RX 250: Performed by: NURSE ANESTHETIST, CERTIFIED REGISTERED

## 2023-10-02 PROCEDURE — 88112 CYTOPATH CELL ENHANCE TECH: CPT | Mod: 26 | Performed by: PATHOLOGY

## 2023-10-02 PROCEDURE — 250N000013 HC RX MED GY IP 250 OP 250 PS 637: Performed by: STUDENT IN AN ORGANIZED HEALTH CARE EDUCATION/TRAINING PROGRAM

## 2023-10-02 PROCEDURE — 250N000011 HC RX IP 250 OP 636: Performed by: STUDENT IN AN ORGANIZED HEALTH CARE EDUCATION/TRAINING PROGRAM

## 2023-10-02 PROCEDURE — 88305 TISSUE EXAM BY PATHOLOGIST: CPT | Mod: TC | Performed by: STUDENT IN AN ORGANIZED HEALTH CARE EDUCATION/TRAINING PROGRAM

## 2023-10-02 PROCEDURE — 44186 LAP JEJUNOSTOMY: CPT | Mod: GC | Performed by: STUDENT IN AN ORGANIZED HEALTH CARE EDUCATION/TRAINING PROGRAM

## 2023-10-02 PROCEDURE — 258N000003 HC RX IP 258 OP 636: Performed by: ANESTHESIOLOGY

## 2023-10-02 PROCEDURE — 250N000011 HC RX IP 250 OP 636: Mod: JZ | Performed by: NURSE ANESTHETIST, CERTIFIED REGISTERED

## 2023-10-02 PROCEDURE — 710N000010 HC RECOVERY PHASE 1, LEVEL 2, PER MIN: Performed by: STUDENT IN AN ORGANIZED HEALTH CARE EDUCATION/TRAINING PROGRAM

## 2023-10-02 PROCEDURE — 258N000003 HC RX IP 258 OP 636: Performed by: NURSE ANESTHETIST, CERTIFIED REGISTERED

## 2023-10-02 RX ORDER — ONDANSETRON 4 MG/1
4 TABLET, ORALLY DISINTEGRATING ORAL EVERY 30 MIN PRN
Status: DISCONTINUED | OUTPATIENT
Start: 2023-10-02 | End: 2023-10-02 | Stop reason: HOSPADM

## 2023-10-02 RX ORDER — HYDROMORPHONE HCL IN WATER/PF 6 MG/30 ML
0.2 PATIENT CONTROLLED ANALGESIA SYRINGE INTRAVENOUS EVERY 5 MIN PRN
Status: DISCONTINUED | OUTPATIENT
Start: 2023-10-02 | End: 2023-10-02 | Stop reason: HOSPADM

## 2023-10-02 RX ORDER — CLINDAMYCIN PHOSPHATE 900 MG/50ML
900 INJECTION, SOLUTION INTRAVENOUS
Status: COMPLETED | OUTPATIENT
Start: 2023-10-02 | End: 2023-10-02

## 2023-10-02 RX ORDER — OXYCODONE HYDROCHLORIDE 10 MG/1
10 TABLET ORAL
Status: DISCONTINUED | OUTPATIENT
Start: 2023-10-02 | End: 2023-10-09 | Stop reason: HOSPADM

## 2023-10-02 RX ORDER — ONDANSETRON 2 MG/ML
4 INJECTION INTRAMUSCULAR; INTRAVENOUS EVERY 30 MIN PRN
Status: DISCONTINUED | OUTPATIENT
Start: 2023-10-02 | End: 2023-10-09 | Stop reason: HOSPADM

## 2023-10-02 RX ORDER — ONDANSETRON 2 MG/ML
INJECTION INTRAMUSCULAR; INTRAVENOUS PRN
Status: DISCONTINUED | OUTPATIENT
Start: 2023-10-02 | End: 2023-10-02

## 2023-10-02 RX ORDER — ENOXAPARIN SODIUM 100 MG/ML
40 INJECTION SUBCUTANEOUS
Status: COMPLETED | OUTPATIENT
Start: 2023-10-02 | End: 2023-10-02

## 2023-10-02 RX ORDER — FENTANYL CITRATE 50 UG/ML
INJECTION, SOLUTION INTRAMUSCULAR; INTRAVENOUS PRN
Status: DISCONTINUED | OUTPATIENT
Start: 2023-10-02 | End: 2023-10-02

## 2023-10-02 RX ORDER — OXYCODONE HYDROCHLORIDE 5 MG/1
5-10 TABLET ORAL EVERY 4 HOURS PRN
Qty: 6 TABLET | Refills: 0 | Status: ON HOLD | OUTPATIENT
Start: 2023-10-02 | End: 2023-10-24

## 2023-10-02 RX ORDER — METOPROLOL TARTRATE 1 MG/ML
1-2 INJECTION, SOLUTION INTRAVENOUS EVERY 5 MIN PRN
Status: DISCONTINUED | OUTPATIENT
Start: 2023-10-02 | End: 2023-10-02 | Stop reason: HOSPADM

## 2023-10-02 RX ORDER — ONDANSETRON 4 MG/1
4 TABLET, ORALLY DISINTEGRATING ORAL EVERY 30 MIN PRN
Status: DISCONTINUED | OUTPATIENT
Start: 2023-10-02 | End: 2023-10-09 | Stop reason: HOSPADM

## 2023-10-02 RX ORDER — ACETAMINOPHEN 325 MG/1
975 TABLET ORAL ONCE
Status: COMPLETED | OUTPATIENT
Start: 2023-10-02 | End: 2023-10-02

## 2023-10-02 RX ORDER — OXYCODONE HYDROCHLORIDE 5 MG/1
5 TABLET ORAL
Status: DISCONTINUED | OUTPATIENT
Start: 2023-10-02 | End: 2023-10-09 | Stop reason: HOSPADM

## 2023-10-02 RX ORDER — HYDROMORPHONE HCL IN WATER/PF 6 MG/30 ML
0.4 PATIENT CONTROLLED ANALGESIA SYRINGE INTRAVENOUS EVERY 5 MIN PRN
Status: DISCONTINUED | OUTPATIENT
Start: 2023-10-02 | End: 2023-10-02 | Stop reason: HOSPADM

## 2023-10-02 RX ORDER — HEPARIN SODIUM (PORCINE) LOCK FLUSH IV SOLN 100 UNIT/ML 100 UNIT/ML
5-10 SOLUTION INTRAVENOUS
Status: DISCONTINUED | OUTPATIENT
Start: 2023-10-02 | End: 2023-10-09 | Stop reason: HOSPADM

## 2023-10-02 RX ORDER — FENTANYL CITRATE 50 UG/ML
25 INJECTION, SOLUTION INTRAMUSCULAR; INTRAVENOUS EVERY 5 MIN PRN
Status: DISCONTINUED | OUTPATIENT
Start: 2023-10-02 | End: 2023-10-02 | Stop reason: HOSPADM

## 2023-10-02 RX ORDER — FENTANYL CITRATE 50 UG/ML
50 INJECTION, SOLUTION INTRAMUSCULAR; INTRAVENOUS EVERY 5 MIN PRN
Status: DISCONTINUED | OUTPATIENT
Start: 2023-10-02 | End: 2023-10-02 | Stop reason: HOSPADM

## 2023-10-02 RX ORDER — BUPIVACAINE HYDROCHLORIDE AND EPINEPHRINE 2.5; 5 MG/ML; UG/ML
INJECTION, SOLUTION EPIDURAL; INFILTRATION; INTRACAUDAL; PERINEURAL PRN
Status: DISCONTINUED | OUTPATIENT
Start: 2023-10-02 | End: 2023-10-02 | Stop reason: HOSPADM

## 2023-10-02 RX ORDER — SODIUM CHLORIDE, SODIUM LACTATE, POTASSIUM CHLORIDE, CALCIUM CHLORIDE 600; 310; 30; 20 MG/100ML; MG/100ML; MG/100ML; MG/100ML
INJECTION, SOLUTION INTRAVENOUS CONTINUOUS PRN
Status: DISCONTINUED | OUTPATIENT
Start: 2023-10-02 | End: 2023-10-02

## 2023-10-02 RX ORDER — DEXAMETHASONE SODIUM PHOSPHATE 4 MG/ML
INJECTION, SOLUTION INTRA-ARTICULAR; INTRALESIONAL; INTRAMUSCULAR; INTRAVENOUS; SOFT TISSUE PRN
Status: DISCONTINUED | OUTPATIENT
Start: 2023-10-02 | End: 2023-10-02

## 2023-10-02 RX ORDER — GLYCOPYRROLATE 0.2 MG/ML
INJECTION, SOLUTION INTRAMUSCULAR; INTRAVENOUS PRN
Status: DISCONTINUED | OUTPATIENT
Start: 2023-10-02 | End: 2023-10-02

## 2023-10-02 RX ORDER — CLINDAMYCIN PHOSPHATE 900 MG/50ML
900 INJECTION, SOLUTION INTRAVENOUS SEE ADMIN INSTRUCTIONS
Status: DISCONTINUED | OUTPATIENT
Start: 2023-10-02 | End: 2023-10-02 | Stop reason: HOSPADM

## 2023-10-02 RX ORDER — SODIUM CHLORIDE, SODIUM LACTATE, POTASSIUM CHLORIDE, CALCIUM CHLORIDE 600; 310; 30; 20 MG/100ML; MG/100ML; MG/100ML; MG/100ML
INJECTION, SOLUTION INTRAVENOUS CONTINUOUS
Status: DISCONTINUED | OUTPATIENT
Start: 2023-10-02 | End: 2023-10-02 | Stop reason: HOSPADM

## 2023-10-02 RX ORDER — ACETAMINOPHEN 325 MG/1
650 TABLET ORAL EVERY 4 HOURS PRN
Qty: 50 TABLET | Refills: 0 | Status: SHIPPED | OUTPATIENT
Start: 2023-10-02

## 2023-10-02 RX ORDER — PROPOFOL 10 MG/ML
INJECTION, EMULSION INTRAVENOUS PRN
Status: DISCONTINUED | OUTPATIENT
Start: 2023-10-02 | End: 2023-10-02

## 2023-10-02 RX ORDER — ONDANSETRON 2 MG/ML
4 INJECTION INTRAMUSCULAR; INTRAVENOUS EVERY 30 MIN PRN
Status: DISCONTINUED | OUTPATIENT
Start: 2023-10-02 | End: 2023-10-02 | Stop reason: HOSPADM

## 2023-10-02 RX ADMIN — ONDANSETRON 4 MG: 2 INJECTION INTRAMUSCULAR; INTRAVENOUS at 13:21

## 2023-10-02 RX ADMIN — SUCCINYLCHOLINE CHLORIDE 200 MG: 20 INJECTION, SOLUTION INTRAMUSCULAR; INTRAVENOUS; PARENTERAL at 11:20

## 2023-10-02 RX ADMIN — HEPARIN SODIUM (PORCINE) LOCK FLUSH IV SOLN 100 UNIT/ML 5 ML: 100 SOLUTION at 16:16

## 2023-10-02 RX ADMIN — PHENYLEPHRINE HYDROCHLORIDE 100 MCG: 10 INJECTION INTRAVENOUS at 11:54

## 2023-10-02 RX ADMIN — PROPOFOL 150 MG: 10 INJECTION, EMULSION INTRAVENOUS at 11:19

## 2023-10-02 RX ADMIN — FENTANYL CITRATE 50 MCG: 50 INJECTION, SOLUTION INTRAMUSCULAR; INTRAVENOUS at 11:19

## 2023-10-02 RX ADMIN — Medication 20 MG: at 12:38

## 2023-10-02 RX ADMIN — PROPOFOL 50 MG: 10 INJECTION, EMULSION INTRAVENOUS at 13:16

## 2023-10-02 RX ADMIN — SUGAMMADEX 50 MG: 100 INJECTION, SOLUTION INTRAVENOUS at 13:25

## 2023-10-02 RX ADMIN — ACETAMINOPHEN 975 MG: 325 TABLET, FILM COATED ORAL at 16:42

## 2023-10-02 RX ADMIN — FENTANYL CITRATE 50 MCG: 50 INJECTION, SOLUTION INTRAMUSCULAR; INTRAVENOUS at 11:37

## 2023-10-02 RX ADMIN — SODIUM CHLORIDE, POTASSIUM CHLORIDE, SODIUM LACTATE AND CALCIUM CHLORIDE: 600; 310; 30; 20 INJECTION, SOLUTION INTRAVENOUS at 13:21

## 2023-10-02 RX ADMIN — Medication 20 MG: at 11:53

## 2023-10-02 RX ADMIN — DEXAMETHASONE SODIUM PHOSPHATE 10 MG: 4 INJECTION, SOLUTION INTRA-ARTICULAR; INTRALESIONAL; INTRAMUSCULAR; INTRAVENOUS; SOFT TISSUE at 11:31

## 2023-10-02 RX ADMIN — PHENYLEPHRINE HYDROCHLORIDE 100 MCG: 10 INJECTION INTRAVENOUS at 12:46

## 2023-10-02 RX ADMIN — PROPOFOL 50 MG: 10 INJECTION, EMULSION INTRAVENOUS at 13:21

## 2023-10-02 RX ADMIN — SUGAMMADEX 50 MG: 100 INJECTION, SOLUTION INTRAVENOUS at 13:28

## 2023-10-02 RX ADMIN — Medication 30 MG: at 11:27

## 2023-10-02 RX ADMIN — ENOXAPARIN SODIUM 40 MG: 40 INJECTION SUBCUTANEOUS at 10:06

## 2023-10-02 RX ADMIN — CLINDAMYCIN PHOSPHATE 900 MG: 900 INJECTION, SOLUTION INTRAVENOUS at 10:42

## 2023-10-02 RX ADMIN — SODIUM CHLORIDE, POTASSIUM CHLORIDE, SODIUM LACTATE AND CALCIUM CHLORIDE: 600; 310; 30; 20 INJECTION, SOLUTION INTRAVENOUS at 11:08

## 2023-10-02 RX ADMIN — SUGAMMADEX 100 MG: 100 INJECTION, SOLUTION INTRAVENOUS at 13:31

## 2023-10-02 RX ADMIN — CLINDAMYCIN PHOSPHATE 900 MG: 900 INJECTION, SOLUTION INTRAVENOUS at 10:44

## 2023-10-02 RX ADMIN — GLYCOPYRROLATE 0.2 MG: 0.2 INJECTION, SOLUTION INTRAMUSCULAR; INTRAVENOUS at 11:54

## 2023-10-02 ASSESSMENT — ACTIVITIES OF DAILY LIVING (ADL)
ADLS_ACUITY_SCORE: 35

## 2023-10-02 ASSESSMENT — ENCOUNTER SYMPTOMS: SEIZURES: 0

## 2023-10-02 ASSESSMENT — LIFESTYLE VARIABLES: TOBACCO_USE: 0

## 2023-10-02 ASSESSMENT — COPD QUESTIONNAIRES: COPD: 0

## 2023-10-02 NOTE — ANESTHESIA POSTPROCEDURE EVALUATION
Patient: Alonso Rogel    Procedure: Procedure(s):  Diagnostic Laparoscopyand Lavage  Esophagoscopy, gastroscopy, duodenoscopy (EGD)  INSERTION, JEJUNOSTOMY TUBE, LAPAROSCOPY-ASSISTED       Anesthesia Type:  General    Note:  Disposition: Outpatient   Postop Pain Control: Uneventful            Sign Out: Well controlled pain   PONV: No   Neuro/Psych: Uneventful            Sign Out: Acceptable/Baseline neuro status   Airway/Respiratory: Uneventful            Sign Out: Acceptable/Baseline resp. status   CV/Hemodynamics: Uneventful            Sign Out: Acceptable CV status; No obvious hypovolemia; No obvious fluid overload   Other NRE: NONE   DID A NON-ROUTINE EVENT OCCUR?            Last vitals:  Vitals Value Taken Time   /70 10/02/23 1500   Temp 36.3  C (97.3  F) 10/02/23 1345   Pulse 68 10/02/23 1501   Resp 16 10/02/23 1400   SpO2 94 % 10/02/23 1501   Vitals shown include unvalidated device data.    Electronically Signed By: Ryan Shepherd MD  October 2, 2023  3:02 PM

## 2023-10-02 NOTE — DISCHARGE INSTRUCTIONS
[facility]  Same-Day Surgery   Adult Discharge Orders & Instructions     For 24 hours after surgery    Get plenty of rest.  A responsible adult must stay with you for at least 24 hours after you leave the hospital.   Do not drive or use heavy equipment.  If you have weakness or tingling, don't drive or use heavy equipment until this feeling goes away.  Do not drink alcohol.  Avoid strenuous or risky activities.  Ask for help when climbing stairs.   You may feel lightheaded.  IF so, sit for a few minutes before standing.  Have someone help you get up.   If you have nausea (feel sick to your stomach): Drink only clear liquids such as apple juice, ginger ale, broth or 7-Up.  Rest may also help.  Be sure to drink enough fluids.  Move to a regular diet as you feel able.  You may have a slight fever. Call the doctor if your fever is over 100 F (37.7 C) (taken under the tongue) or lasts longer than 24 hours.  You may have a dry mouth, a sore throat, muscle aches or trouble sleeping.  These should go away after 24 hours.  Do not make important or legal decisions.   Call your doctor for any of the followin.  Signs of infection (fever, growing tenderness at the surgery site, a large amount of drainage or bleeding, severe pain, foul-smelling drainage, redness, swelling).    2. It has been over 8 to 10 hours since surgery and you are still not able to urinate (pass water).    3.  Headache for over 24 hours.    4.  Numbness, tingling or weakness the day after surgery (if you had spinal anesthesia).  To contact a doctor, call Dr. Jennifer King @ [phone] (clinic) or [phone] (office) or:    '   [phone] and ask for the resident on call for thoracic surgery (answered 24 hours a day)  '   Emergency Department:    [facility]: [phone]       (TTY for hearing impaired: [phone])    [facility]: [phone]       (TTY for hearing impaired: [phone])

## 2023-10-02 NOTE — BRIEF OP NOTE
[facility]    Brief Operative Note    Pre-operative diagnosis: Malignant neoplasm of esophagus, unspecified location (H) [C15.9]  Post-operative diagnosis Same as pre-operative diagnosis    Procedure: Diagnostic Laparoscopyand Lavage, N/A - Chest  Esophagoscopy, gastroscopy, duodenoscopy (EGD), N/A - Esophagus  INSERTION, JEJUNOSTOMY TUBE, LAPAROSCOPY-ASSISTED, N/A - Abdomen    Surgeon: Surgeon(s) and Role:     * Jennifer King MD - Primary     * Daniel Oropeza MD - Resident - Assisting     * Keerthi David MD - Resident - Assisting  Anesthesia: General   Estimated Blood Loss: 10 mL from 10/2/2023 11:08 AM to 10/2/2023  1:41 PM      Drains: J tube  Specimens:   ID Type Source Tests Collected by Time Destination   1 : Peritoneal Nodule Tissue Other SURGICAL PATHOLOGY EXAM Jennifer King MD 10/2/2023 11:57 AM    2 : Abdominal Washings Washings Other NON-GYNECOLOGIC CYTOLOGY Jennifer King MD 10/2/2023 12:50 PM      Findings:   Benign appearing EGD, 1 small omental nodule noted, sent for frozen, just calcification no necrosis or evidence of neoplasm  .  Complications: None.  Implants: * No implants in log *      J tube in place, secured with 3 sutures

## 2023-10-02 NOTE — ANESTHESIA CARE TRANSFER NOTE
Patient: Alonso Rogel    Procedure: Procedure(s):  Diagnostic Laparoscopyand Lavage  Esophagoscopy, gastroscopy, duodenoscopy (EGD)  INSERTION, JEJUNOSTOMY TUBE, LAPAROSCOPY-ASSISTED       Diagnosis: Malignant neoplasm of esophagus, unspecified location (H) [C15.9]  Diagnosis Additional Information: No value filed.    Anesthesia Type:   General     Note:    Oropharynx: oropharynx clear of all foreign objects  Level of Consciousness: awake  Oxygen Supplementation: room air    Independent Airway: airway patency satisfactory and stable  Dentition: dentition unchanged  Vital Signs Stable: post-procedure vital signs reviewed and stable  Report to RN Given: handoff report given  Patient transferred to: PACU    Handoff Report: Identifed the Patient, Identified the Reponsible Provider, Reviewed the pertinent medical history, Discussed the surgical course, Reviewed Intra-OP anesthesia mangement and issues during anesthesia, Set expectations for post-procedure period and Allowed opportunity for questions and acknowledgement of understanding      Vitals:  Vitals Value Taken Time   /88 10/02/23 1345   Temp 36.3  C (97.3  F) 10/02/23 1345   Pulse 69 10/02/23 1346   Resp 15 10/02/23 1345   SpO2 94 % 10/02/23 1346   Vitals shown include unvalidated device data.    Electronically Signed By: LATISHA Reyes CRNA  October 2, 2023  1:47 PM

## 2023-10-02 NOTE — ANESTHESIA PROCEDURE NOTES
Airway       Patient location during procedure: OR       Procedure Start/Stop Times: 10/2/2023 11:22 AM  Staff -        CRNA: Inocencia Ulrich APRN CRNA       Performed By: CRNA  Consent for Airway        Urgency: elective  Indications and Patient Condition       Indications for airway management: tomy-procedural       Induction type:intravenous       Mask difficulty assessment: 1 - vent by mask    Final Airway Details       Final airway type: endotracheal airway       Successful airway: ETT - single  Endotracheal Airway Details        ETT size (mm): 7.5       Cuffed: yes       Successful intubation technique: video laryngoscopy       VL Blade Size: Glidescope 3       Grade View of Cords: 1       Adjucts: stylet       Position: Right       Measured from: lips       Secured at (cm): 23       Bite block used: None    Post intubation assessment        Placement verified by: capnometry, equal breath sounds and chest rise        Number of attempts at approach: 1       Number of other approaches attempted: 0       Secured with: pink tape       Ease of procedure: easy       Dentition: Intact and Unchanged    Medication(s) Administered   Medication Administration Time: 10/2/2023 11:22 AM

## 2023-10-02 NOTE — ANESTHESIA PREPROCEDURE EVALUATION
Anesthesia Pre-Procedure Evaluation    Patient: Alonso Rogel   MRN: [id number] : 1950        Procedure : Procedure(s):  Diagnostic Laparoscopy  Esophagoscopy, gastroscopy, duodenoscopy (EGD)  INSERTION, JEJUNOSTOMY TUBE, LAPAROSCOPY-ASSISTED          Past Medical History:   Diagnosis Date    Anemia     Esophageal cancer (H)     History of prostate cancer     History of thyroid cancer     HLD (hyperlipidemia)     Neuropathy     SNHL (sensorineural hearing loss)       Past Surgical History:   Procedure Laterality Date    APPENDECTOMY      CRANIOTOMY      PROSTATECTOMY      THYROIDECTOMY, PARTIAL Left       Allergies   Allergen Reactions    Penicillins Rash     PN: LW Reaction: Reaction as a child       Social History     Tobacco Use    Smoking status: Never     Passive exposure: Never    Smokeless tobacco: Never   Substance Use Topics    Alcohol use: Not Currently     Alcohol/week: 5.0 - 6.0 standard drinks of alcohol     Types: 5 - 6 Standard drinks or equivalent per week     Comment: None since       Wt Readings from Last 1 Encounters:   10/02/23 106.1 kg (233 lb 14.5 oz)        Anesthesia Evaluation   Pt has had prior anesthetic. Type: General.    History of anesthetic complications  - difficult airway.      ROS/MED HX  ENT/Pulmonary: Comment: SNHL   (-) tobacco use, asthma and COPD   Neurologic:     (+)    peripheral neuropathy, - mild, right hand.                        (-) no seizures, no CVA and no TIA   Cardiovascular: Comment: Denies chest pain/pressure, CL[place], orthopnea, dizziness, palpitations, edema.      (+) Dyslipidemia - -   -  - -                                 Previous cardiac testing   Echo: Date: Results:    Stress Test:  Date:  Results:  CONCLUSION:    1.  Normal exercise echocardiogram with adequate heart rate and workload.   2.  No evidence for inducible ischemia.   3.  Image quality was poor.   4.  This test has diminished sensitivity due to poor imaging quality.     ECG  Reviewed:  Date: 5/17/2022 Results:  Sinus rhythm   Normal ECG   When compared with ECG of 16-NOV-2022 16:16,   Premature ventricular complexes are no longer Present   Cath:  Date: Results:      METS/Exercise Tolerance: >4 METS Comment: Walks daily, cares for [place] and does all yard work.    Hematologic:     (+)      anemia,       (-) history of blood clots and history of blood transfusion   Musculoskeletal:  - neg musculoskeletal ROS     GI/Hepatic:     (+)   esophageal disease,              (-) GERD and liver disease   Renal/Genitourinary:  - neg Renal ROS     Endo: Comment: S/p partial thyroidectomy    (+)               Obesity,    (-) Type II DM   Psychiatric/Substance Use:  - neg psychiatric ROS     Infectious Disease:  - neg infectious disease ROS     Malignancy:   (+) Malignancy, History of GI, Prostate and Other.Prostate CA Remission status post Surgery.  GI CA  Active status post.  Other CA Thyroid cancer Remission status post Surgery.    Other:  - neg other ROS          Physical Exam    Airway        Mallampati: III   TM distance: > 3 FB   Neck ROM: full   Mouth opening: > 3 cm    Respiratory Devices and Support         Dental       (+) Minor Abnormalities - some fillings, tiny chips      Cardiovascular          Rhythm and rate: regular and normal     Pulmonary           breath sounds clear to auscultation           OUTSIDE LABS:  CBC: No results found for: WBC, HGB, HCT, PLT  BMP: No results found for: NA, POTASSIUM, CHLORIDE, CO2, BUN, CR, GLC  COAGS: No results found for: PTT, INR, FIBR  POC: No results found for: BGM, HCG, HCGS  HEPATIC: No results found for: ALBUMIN, PROTTOTAL, ALT, AST, GGT, ALKPHOS, BILITOTAL, BILIDIRECT, SASKIA  OTHER: No results found for: PH, LACT, A1C, RENNY, PHOS, MAG, LIPASE, AMYLASE, TSH, T4, T3, CRP, SED    Anesthesia Plan    ASA Status:  3    NPO Status:  NPO Appropriate    Anesthesia Type: General.     - Airway: ETT   Induction: Intravenous, RSI.   Maintenance: Balanced.    Techniques and Equipment:     - Airway: Video-Laryngoscope (previous intubation with glidescope 3)     - Lines/Monitors: 2nd IV     Consents    Anesthesia Plan(s) and associated risks, benefits, and realistic alternatives discussed. Questions answered and patient/representative(s) expressed understanding.     - Discussed:     - Discussed with:  Patient            Postoperative Care    Pain management: IV analgesics.   PONV prophylaxis: Ondansetron (or other 5HT-3), Dexamethasone or Solumedrol     Comments:                Tammi Valadez MD

## 2023-10-03 LAB
PATH REPORT.COMMENTS IMP SPEC: NORMAL
PATH REPORT.FINAL DX SPEC: NORMAL
PATH REPORT.GROSS SPEC: NORMAL
PATH REPORT.MICROSCOPIC SPEC OTHER STN: NORMAL
PATH REPORT.RELEVANT HX SPEC: NORMAL

## 2023-10-03 ASSESSMENT — ACTIVITIES OF DAILY LIVING (ADL)
ADLS_ACUITY_SCORE: 35

## 2023-10-04 LAB
PATH REPORT.COMMENTS IMP SPEC: NORMAL
PATH REPORT.FINAL DX SPEC: NORMAL
PATH REPORT.GROSS SPEC: NORMAL
PATH REPORT.INTRAOP OBS SPEC DOC: NORMAL
PATH REPORT.MICROSCOPIC SPEC OTHER STN: NORMAL
PATH REPORT.RELEVANT HX SPEC: NORMAL
PHOTO IMAGE: NORMAL

## 2023-10-04 ASSESSMENT — ACTIVITIES OF DAILY LIVING (ADL)
ADLS_ACUITY_SCORE: 35

## 2023-10-05 ENCOUNTER — TELEPHONE (OUTPATIENT)
Dept: SURGERY | Facility: CLINIC | Age: 73
End: 2023-10-05
Payer: COMMERCIAL

## 2023-10-05 ASSESSMENT — ACTIVITIES OF DAILY LIVING (ADL)
ADLS_ACUITY_SCORE: 35

## 2023-10-05 NOTE — TELEPHONE ENCOUNTER
Post discharge call placed to pt. The following was discussed:    -Pt reports that mild/mod pain is well managed with Tylenol. Some tenderness on abd near tube site. Denies s/sx of infection.   -Pt verbalized that they have been flushing the tube and doing wound care  -BM's have normalized with use of senna and docusate  -Confirmed follow up appts  -Pt states that he feels well overall and had no additional questions or concerns.    Jessica Ku, RN BSN  Thoracic Surgery RN Care Coordinator  [phone]

## 2023-10-05 NOTE — TELEPHONE ENCOUNTER
Faxed request to [facility] for slides from case [id number] per in-basket request of KYLE Avila.  [employer] tracking number: [id number]

## 2023-10-06 DIAGNOSIS — C15.9 MALIGNANT NEOPLASM OF ESOPHAGUS, UNSPECIFIED LOCATION (H): Primary | ICD-10-CM

## 2023-10-06 ASSESSMENT — ACTIVITIES OF DAILY LIVING (ADL)
ADLS_ACUITY_SCORE: 35

## 2023-10-07 ASSESSMENT — ACTIVITIES OF DAILY LIVING (ADL)
ADLS_ACUITY_SCORE: 35

## 2023-10-08 ASSESSMENT — ACTIVITIES OF DAILY LIVING (ADL)
ADLS_ACUITY_SCORE: 35

## 2023-10-11 ENCOUNTER — PREP FOR PROCEDURE (OUTPATIENT)
Dept: SURGERY | Facility: CLINIC | Age: 73
End: 2023-10-11
Payer: COMMERCIAL

## 2023-10-11 DIAGNOSIS — C15.4 MALIGNANT NEOPLASM OF MIDDLE THIRD OF ESOPHAGUS (H): Primary | ICD-10-CM

## 2023-10-11 PROBLEM — H90.3 SENSORINEURAL HEARING LOSS OF BOTH EARS: Status: ACTIVE | Noted: 2021-09-09

## 2023-10-11 PROBLEM — C73 PAPILLARY THYROID CARCINOMA (H): Status: ACTIVE | Noted: 2022-09-22

## 2023-10-13 NOTE — OP NOTE
Procedure Date: 10/2//23     PREOPERATIVE DIAGNOSIS:  Esophageal cancer    POSTOPERATIVE DIAGNOSIS:  Esophageal cancer        PROCEDURE PERFORMED:  Diagnostic laparoscopy, laparoscopic assisted jejunostomy tube insertion, diagnostic peritoneal lavage and esophagogastroduodenoscopy, peritoneal biopsy     OPERATING SURGEON:  Jennifer King M.D.      ASSISTING SURGEON:  Yannick Oropeza MD     OPERATIVE FINDINGS:  No metastatic disease identified in the peritoneum, evidence of treated tumor identified on endoscopy .     DESCRIPTION OF PROCEDURE:  After obtaining informed consent, the patient was brought to the operating room and laid supine on the operating table.  After induction of general anesthesia and introduction of an endotracheal tube, the patient was positioned supine with the arms out and a footboard.  The abdomen was prepped and draped in a sterile manner.  We used an open Phillip technique to enter the peritoneal cavity.  We had 3 other working ports.  After insufflation of the abdomen, a thorough inspection was carried out to confirm that there was no evidence of metastatic disease. We noted a small peritoneal nodule that we biopsied and sent to pathology.  We then instilled 3 L of warm saline in his peritoneal cavity and after making sure it was well distributed, we suctioned back all the fluid and sent it to cytology. After this, we identified the ligament of Treitz.  We identified a loop of bowel about 30 cm distal to it.  This was tacked up to the abdominal wall using zero silks on the Jameel-Bubba device.  A pursestring suture was then inserted between these two, and using the Seldinger technique, a 16-Romansh jejunostomy feeding tube was inserted into the bowel loop going distally.  Additional anterior tacking stitch and an anti-torsion stitch were also inserted.  Position of the jejunostomy tube was confirmed with flushing.  Hemostasis was ensured and the port sites were closed in layers.  After this, we  performed an upper endoscopy with the above-noted findings.  The patient tolerated the procedure well.

## 2023-10-16 ENCOUNTER — LAB REQUISITION (OUTPATIENT)
Dept: LAB | Facility: CLINIC | Age: 73
End: 2023-10-16
Payer: COMMERCIAL

## 2023-10-16 LAB
ABO/RH(D): NORMAL
ANTIBODY SCREEN: NEGATIVE
SPECIMEN EXPIRATION DATE: NORMAL

## 2023-10-16 PROCEDURE — 88321 CONSLTJ&REPRT SLD PREP ELSWR: CPT | Performed by: PATHOLOGY

## 2023-10-17 ENCOUNTER — ANESTHESIA EVENT (OUTPATIENT)
Dept: SURGERY | Facility: CLINIC | Age: 73
DRG: 327 | End: 2023-10-17
Payer: COMMERCIAL

## 2023-10-17 ENCOUNTER — ANCILLARY PROCEDURE (OUTPATIENT)
Dept: CT IMAGING | Facility: CLINIC | Age: 73
End: 2023-10-17
Attending: CLINICAL NURSE SPECIALIST
Payer: COMMERCIAL

## 2023-10-17 ENCOUNTER — OFFICE VISIT (OUTPATIENT)
Dept: SURGERY | Facility: CLINIC | Age: 73
End: 2023-10-17
Payer: COMMERCIAL

## 2023-10-17 ENCOUNTER — LAB (OUTPATIENT)
Dept: LAB | Facility: CLINIC | Age: 73
End: 2023-10-17
Attending: STUDENT IN AN ORGANIZED HEALTH CARE EDUCATION/TRAINING PROGRAM
Payer: COMMERCIAL

## 2023-10-17 VITALS
BODY MASS INDEX: 31.83 KG/M2 | OXYGEN SATURATION: 100 % | RESPIRATION RATE: 16 BRPM | HEIGHT: 72 IN | TEMPERATURE: 97.8 F | SYSTOLIC BLOOD PRESSURE: 130 MMHG | WEIGHT: 235 LBS | DIASTOLIC BLOOD PRESSURE: 72 MMHG | HEART RATE: 66 BPM

## 2023-10-17 DIAGNOSIS — C15.9 MALIGNANT NEOPLASM OF ESOPHAGUS, UNSPECIFIED LOCATION (H): ICD-10-CM

## 2023-10-17 DIAGNOSIS — Z01.818 PRE-OP EVALUATION: ICD-10-CM

## 2023-10-17 DIAGNOSIS — Z01.818 PRE-OP EVALUATION: Primary | ICD-10-CM

## 2023-10-17 DIAGNOSIS — C15.4 MALIGNANT NEOPLASM OF MIDDLE THIRD OF ESOPHAGUS (H): ICD-10-CM

## 2023-10-17 LAB
ALBUMIN SERPL BCG-MCNC: 4.3 G/DL (ref 3.5–5.2)
ANION GAP SERPL CALCULATED.3IONS-SCNC: 10 MMOL/L (ref 7–15)
BUN SERPL-MCNC: 20.7 MG/DL (ref 8–23)
CALCIUM SERPL-MCNC: 9.3 MG/DL (ref 8.8–10.2)
CHLORIDE SERPL-SCNC: 106 MMOL/L (ref 98–107)
CREAT SERPL-MCNC: 0.98 MG/DL (ref 0.67–1.17)
DEPRECATED HCO3 PLAS-SCNC: 24 MMOL/L (ref 22–29)
EGFRCR SERPLBLD CKD-EPI 2021: 81 ML/MIN/1.73M2
ERYTHROCYTE [DISTWIDTH] IN BLOOD BY AUTOMATED COUNT: 12 % (ref 10–15)
GLUCOSE SERPL-MCNC: 83 MG/DL (ref 70–99)
HCT VFR BLD AUTO: 39.1 % (ref 40–53)
HGB BLD-MCNC: 13.2 G/DL (ref 13.3–17.7)
HOLD SPECIMEN: NORMAL
MCH RBC QN AUTO: 32.2 PG (ref 26.5–33)
MCHC RBC AUTO-ENTMCNC: 33.8 G/DL (ref 31.5–36.5)
MCV RBC AUTO: 95 FL (ref 78–100)
PATH REPORT.COMMENTS IMP SPEC: ABNORMAL
PATH REPORT.COMMENTS IMP SPEC: YES
PATH REPORT.FINAL DX SPEC: ABNORMAL
PATH REPORT.GROSS SPEC: ABNORMAL
PATH REPORT.MICROSCOPIC SPEC OTHER STN: ABNORMAL
PATH REPORT.RELEVANT HX SPEC: ABNORMAL
PATH REPORT.RELEVANT HX SPEC: ABNORMAL
PATH REPORT.SITE OF ORIGIN SPEC: ABNORMAL
PLATELET # BLD AUTO: 165 10E3/UL (ref 150–450)
POTASSIUM SERPL-SCNC: 4.2 MMOL/L (ref 3.4–5.3)
RBC # BLD AUTO: 4.1 10E6/UL (ref 4.4–5.9)
SODIUM SERPL-SCNC: 140 MMOL/L (ref 135–145)
WBC # BLD AUTO: 4.3 10E3/UL (ref 4–11)

## 2023-10-17 PROCEDURE — 84134 ASSAY OF PREALBUMIN: CPT

## 2023-10-17 PROCEDURE — 86850 RBC ANTIBODY SCREEN: CPT

## 2023-10-17 PROCEDURE — 71250 CT THORAX DX C-: CPT | Mod: GC | Performed by: RADIOLOGY

## 2023-10-17 PROCEDURE — 80048 BASIC METABOLIC PNL TOTAL CA: CPT

## 2023-10-17 PROCEDURE — 86901 BLOOD TYPING SEROLOGIC RH(D): CPT

## 2023-10-17 PROCEDURE — 36415 COLL VENOUS BLD VENIPUNCTURE: CPT

## 2023-10-17 PROCEDURE — 82040 ASSAY OF SERUM ALBUMIN: CPT

## 2023-10-17 PROCEDURE — 85027 COMPLETE CBC AUTOMATED: CPT

## 2023-10-17 PROCEDURE — 99215 OFFICE O/P EST HI 40 MIN: CPT | Performed by: PHYSICIAN ASSISTANT

## 2023-10-17 ASSESSMENT — ENCOUNTER SYMPTOMS: SEIZURES: 0

## 2023-10-17 ASSESSMENT — PAIN SCALES - GENERAL: PAINLEVEL: MILD PAIN (2)

## 2023-10-17 ASSESSMENT — COPD QUESTIONNAIRES: COPD: 0

## 2023-10-17 ASSESSMENT — LIFESTYLE VARIABLES: TOBACCO_USE: 0

## 2023-10-17 NOTE — PATIENT INSTRUCTIONS
Preparing for Your Surgery      Name:  Alonso Rogel   MRN:  [id number]   :  1950   Today's Date:  10/17/2023       Arriving for surgery:  Surgery date:  10/23/23  Arrival time:  5:30 am    Please come to:     [facility] [address]  [address] [place]  [address]      The [facility] Patient /Visitor Ramp is located at [address]. Patients and visitors who self-park will receive the reduced hospital parking rate. If the Patient /Visitor Ramp is full, please follow the signs to the [employer] parking located at the main hospital entrance.     parking is available ( 24 hours/ 7 days a week)    Discounted parking pass options are available for patients and visitors. They can be purchased at the [email or web address] desk at the main hospital entrance.    -  Stop at the security desk and they will direct surgery patients to Registration, and then the 3rd floor Surgery Waiting Room. [phone]     -  If you are in need of directions, wheelchair or escort please stop at the Information/security desk in the lobby.       What can I eat or drink?  -  You may eat and drink normally up to 8 hours prior to arrival time. (Until 9:30 pm 10-22-23)  -  You may have clear liquids until 2 hours prior to arrival time. (Until 3:30 am)    Examples of clear liquids:  Water  Clear broth  Juices (apple, white grape, white cranberry  and cider) without pulp  Noncarbonated, powder based beverages  (lemonade and Leif-Aid)  Sodas (Sprite, 7-Up, ginger ale and seltzer)  Coffee or tea (without milk or cream)  Gatorade    -  No Alcohol or cannabis products for at least 24 hours before surgery.     Which medicines can I take?  Hold Aspirin for 7 days before surgery.   Hold Multivitamins for 7 days before surgery. Hold the Multivitamin starting now if you have not already, and hold until surgery.  Hold Supplements for 7 days before surgery.  Hold Ibuprofen  (Advil, Motrin) for 1 day before surgery--unless otherwise directed by surgeon.  Hold Naproxen (Aleve) for 4 days before surgery.  Acetaminophen (Tylenol) is okay to take if needed.    -  DO NOT take these medications the day of surgery:  Docusate Sodium (Colace)    -  PLEASE TAKE these medications the day of surgery:  Acetaminophen (Tylenol) if needed    How do I prepare myself?  - Please take 2 showers (one the night prior to surgery and one the morning of surgery) using Scrubcare or Hibiclens soap.    Use this soap only from the neck to your toes.     Leave the soap on your skin for one minute--then rinse thoroughly.      You may use your own shampoo and conditioner. No other hair products.   - Please remove all jewelry and body piercings.  - No lotions, deodorants or fragrance.  - Bring your ID and insurance card.    -If you have a Deep Brain Stimulator, Spinal Cord Stimulator, or any Neuro Stimulator device---you must bring the remote control to the hospital.      ALL PATIENTS GOING HOME THE SAME DAY OF SURGERY ARE REQUIRED TO HAVE A RESPONSIBLE ADULT TO DRIVE AND BE IN ATTENDANCE WITH THEM FOR 24 HOURS FOLLOWING SURGERY.    Covid testing policy as of 12/06/2022  Your surgeon will notify and schedule you for a COVID test if one is needed before surgery--please direct any questions or COVID symptoms to your surgeon      Questions or Concerns:    - For any questions regarding the day of surgery or your hospital stay, please contact the Pre Admission Nursing Office at [phone].       - If you have health changes between today and your surgery, please call your surgeon.       - For questions after surgery, please call your surgeons office.           Current Visitor Guidelines    You may have 2 visitors in the pre op area.    Visiting hours: 8 a.m. to 8:30 p.m.    You may have four visitors during your inpatient hospital stay.    Patients confirmed or suspected to have symptoms of COVID 19 or flu:     No  visitors allowed for adult patients.   Children (under age 18) can have 1 named visitor.     People who are sick or showing symptoms of COVID 19 or flu:    Are not allowed to visit patients--we can only make exceptions in special situations.       Please follow these guidelines for your visit:          Please maintain social distance          Masking is optional--however at times you may be asked to wear a mask for the safety of yourself and others     Clean your hands with alcohol hand . Do this when you arrive at and leave the building and patient room,    And again after you touch your mask or anything in the room.     Go directly to and from the room you are visiting.     Stay in the patient s room during your visit. Limit going to other places in the hospital as much as possible     Leave bags and jackets at home or in the car.     For everyone s health, please don t come and go during your visit. That includes for smoking   during your visit.

## 2023-10-17 NOTE — H&P
Pre-Operative H & P     CC:  Preoperative exam to assess for increased cardiopulmonary risk while undergoing surgery and anesthesia.    Date of Encounter: 10/17/2023  Primary Care Physician:  Say Rodriguez     Reason for visit:   Encounter Diagnoses   Name Primary?    Pre-op evaluation Yes    Malignant neoplasm of esophagus, unspecified location (H)        HPI  Alonso Rogel is a 72 year old male who presents for pre-operative H & P in preparation for  Procedure Information       Case: [id number] Date/Time: 10/23/23 0730    Procedure: MINIMALLY INVASIVE ESOPHAGECTOMY (Chest)    Anesthesia type: General    Diagnosis: Malignant neoplasm of esophagus, unspecified location (H) [C15.9]    Pre-op diagnosis: Malignant neoplasm of esophagus, unspecified location (H) [C15.9]    Location: [facility] OR  /  OR    Providers: Jennifer King MD            Patient is being evaluated for comorbid conditions of hyperlipidemia, anemia, sensorineural hearing loss, neuropathy, history of thyroid cancer, and history of prostate cancer.    He has a history of esophageal cancer diagnosed in May after presenting with food impaction.  He is status post chemoradiation most recently underwent EGD, diagnostic laparoscopy, and J-tube placement on 10/2/2023.  Staged approach has been recommended for surgical intervention and he is now scheduled for esophagectomy as a second stage as above.    History is obtained from the patient and chart review.  He is accompanied by his wife today.    Hx of abnormal bleeding or anti-platelet use: None      Past Medical History  Past Medical History:   Diagnosis Date    Anemia     Esophageal cancer (H)     History of prostate cancer     History of thyroid cancer     HLD (hyperlipidemia)     Neuropathy     SNHL (sensorineural hearing loss)        Past Surgical History  Past Surgical History:   Procedure Laterality Date    APPENDECTOMY      CRANIOTOMY      ESOPHAGOSCOPY, GASTROSCOPY, DUODENOSCOPY (EGD), COMBINED  N/A 10/2/2023    Procedure: Esophagoscopy, gastroscopy, duodenoscopy (EGD);  Surgeon: Jennifer King MD;  Location: [facility] OR    LAPAROSCOPIC ASSISTED INSERTION TUBE JEJUNOSTOMY N/A 10/2/2023    Procedure: INSERTION, JEJUNOSTOMY TUBE, LAPAROSCOPY-ASSISTED;  Surgeon: Jennifer King MD;  Location: [facility] OR    LAPAROSCOPY DIAGNOSTIC (GENERAL) N/A 10/2/2023    Procedure: Diagnostic Laparoscopyand Lavage;  Surgeon: Jennifer King MD;  Location: [facility] OR    PROSTATECTOMY      THYROIDECTOMY, PARTIAL Left        Prior to Admission Medications  Current Outpatient Medications   Medication Sig Dispense Refill    acetaminophen (TYLENOL) 325 MG tablet Take 2 tablets (650 mg) by mouth every 4 hours as needed for mild pain 50 tablet 0    docusate sodium (COLACE) 250 MG capsule Take 250 mg by mouth 2 times daily      multivitamin w/minerals (THERA-VIT-M) tablet Take 1 tablet by mouth every evening      simvastatin (ZOCOR) 80 MG tablet Take 80 mg by mouth At Bedtime      [facility] AS DIRECTED TODAY      oxyCODONE (ROXICODONE) 5 MG tablet Take 1-2 tablets (5-10 mg) by mouth every 4 hours as needed for moderate to severe pain (Patient not taking: Reported on 10/17/2023) 6 tablet 0    sennosides (SENOKOT) 8.6 MG tablet Take 1 tablet by mouth 2 times daily (Patient not taking: Reported on 10/17/2023)      triamcinolone (KENALOG) 0.1 % external cream Apply topically as needed (Patient not taking: Reported on 10/17/2023)         Allergies  Allergies   Allergen Reactions    Penicillins Rash     PN: LW Reaction: Reaction as a child        Social History  Social History     Socioeconomic History    Marital status:      Spouse name: Not on file    Number of children: Not on file    Years of education: Not on file    Highest education level: Not on file   Occupational History    Not on file   Tobacco Use    Smoking status: Never     Passive exposure: Never    Smokeless tobacco: Never   Substance and Sexual  "Activity    Alcohol use: Not Currently     Comment: None since 6/23    Drug use: Never    Sexual activity: Not on file   Other Topics Concern    Not on file   Social History Narrative    Not on file     Social Determinants of Health     Financial Resource Strain: Not on file   Food Insecurity: Not on file   Transportation Needs: Not on file   Physical Activity: Not on file   Stress: Not on file   Social Connections: Not on file   Interpersonal Safety: Not on file   Housing Stability: Not on file       Family History  Family History   Problem Relation Age of Onset    Other - See Comments Mother         \"difficulty with opioids\" - severe nausea    Breast Cancer Mother     Myocardial Infarction Father 60    Kidney failure Father 66    Prostate Cancer Father 66    Breast Cancer Sister     Anesthesia Reaction No family hx of     Venous thrombosis No family hx of        Review of Systems  The complete review of systems is negative other than noted in the HPI or here.   Anesthesia Evaluation   Pt has had prior anesthetic.     History of anesthetic complications  - difficult airway.      ROS/MED HX  ENT/Pulmonary: Comment: SNHL   (-) tobacco use, asthma, COPD and sleep apnea   Neurologic:     (+)    peripheral neuropathy, - mild, right hand.                        (-) no seizures, no CVA and no TIA   Cardiovascular: Comment: Denies chest pain/pressure, CLIFTON, orthopnea, dizziness, palpitations, edema.      (+) Dyslipidemia - -   -  - -                                 Previous cardiac testing   Echo: Date: Results:    Stress Test:  Date: 2002 Results:  CONCLUSION:    1.  Normal exercise echocardiogram with adequate heart rate and workload.   2.  No evidence for inducible ischemia.   3.  Image quality was poor.   4.  This test has diminished sensitivity due to poor imaging quality.     ECG Reviewed:  Date: 5/17/2022 Results:  Sinus rhythm   Normal ECG   When compared with ECG of 16-NOV-2022 16:16,   Premature ventricular " "complexes are no longer Present   Cath:  Date: Results:   (-) hypertension and CAD   METS/Exercise Tolerance: 4 - Raking leaves, gardening Comment: Currently limited due recent surgery and recovery from that.    Walks daily, [place] and does all yard work.    Hematologic:     (+)      anemia,       (-) history of blood clots and history of blood transfusion   Musculoskeletal:  - neg musculoskeletal ROS     GI/Hepatic: Comment: J-tube in place - not using for anything yet    (+)   esophageal disease (esophageal cancer),              (-) GERD and liver disease   Renal/Genitourinary:  - neg Renal ROS     Endo: Comment: S/p partial thyroidectomy    (+)               Obesity,    (-) Type II DM   Psychiatric/Substance Use:  - neg psychiatric ROS     Infectious Disease:  - neg infectious disease ROS     Malignancy:   (+) Malignancy, History of GI, Prostate and Other.Prostate CA Remission status post Surgery.  GI CA  Active status post.  Other CA Thyroid cancer Remission status post Surgery.    Other:  - neg other ROS          /72 (BP Location: Right arm, Patient Position: Sitting, Cuff Size: Adult Large)   Pulse 66   Temp 97.8  F (36.6  C) (Oral)   Resp 16   Ht 1.816 m (5' 11.5\")   Wt 106.6 kg (235 lb)   SpO2 100%   BMI 32.32 kg/m      Physical Exam   Constitutional: Pleasant, well-appearing male, no apparent distress, and appears stated age.  Eyes: Pupils equal, round and reactive to light, extra ocular muscles intact, sclera clear, conjunctiva normal.  HENT: Normocephalic and atraumatic, oral pharynx with moist mucus membranes, good dentition. No goiter appreciated.   Respiratory: Clear to auscultation bilaterally, no crackles or wheezing.  Cardiovascular: Regular rate and rhythm, normal S1 and S2, and no murmur noted.  Carotids +2, no bruits. No edema. Palpable pulses to radial  DP and PT arteries.   GI: Normal bowel sounds, soft, non-distended, non-tender, no masses palpated, no " hepatosplenomegaly.  J-tube in place bumper secured to skin with stitch, no erythema or drainage.  Laparoscopic incisions well-healed.  Lymph/Hematologic: No cervical lymphadenopathy and no supraclavicular lymphadenopathy.  Genitourinary:  Deferred  Skin: Warm and dry.  No rashes on exposed skin   Musculoskeletal: Full ROM of neck. There is no redness, warmth, or swelling of the visible joints. Gross motor strength is normal.    Neurologic: Awake, alert, oriented to name, place and time. Cranial nerves II-XII are grossly intact. Gait is normal.   Neuropsychiatric: Calm, cooperative. Normal affect.       Prior Labs/Diagnostic Studies   All labs and imaging personally reviewed     EKG/ stress test - if available please see in ROS above   No results found.      Latest Ref Rng & Units 9/28/2023     2:17 PM   PFT   FVC L 5.28    FEV1 L 3.56    FVC% % 131    FEV1% % 117          The patient's records and results personally reviewed by this provider.     Outside records reviewed from: Care Everywhere    LAB/DIAGNOSTIC STUDIES TODAY: Type and screen, BMP, CBC    Assessment  Alonso Rogel is a 72 year old male seen as a PAC referral for risk assessment and optimization for anesthesia.    Plan/Recommendations  Pt will be optimized for the proposed procedure.  See below for details on the assessment, risk, and preoperative recommendations    NEUROLOGY  - No history of TIA, CVA or seizure  - Neuropathy affecting right hand, patient reports this is mild. Not currently on medications.    -Post Op delirium risk factors:  Age    ENT  - Patient has previous history of complicated airway.   Per chart review was glide grade 2 in 2013- was about 5kg heavier and did have full beard at that time. Last intubation 10/2/2023 noted to be easy.  Per chart review video laryngoscopy was used    Mallampati: III  TM: > 3    CARDIAC  - No history of CAD, Hypertension, and Afib  - Hyperlipidemia  Well controlled on home regimen  - Patient's  "activity has been somewhat decreased after surgery two weeks ago but he continues to do as much as he can including walking. He denies chest pain/pressure, CLIFTON, orthopnea, dizziness, palpitations, edema.    - METS (Metabolic Equivalents)  Patient performs 4 or more METS exercise without symptoms            Total Score: 0      RCRI-Low risk: Class 2 0.9% complication rate            Total Score: 1    RCRI: High Risk Surgery        PULMONARY    ELIZABETH Low Risk            Total Score: 2    ELIZABETH: Over 50 ys old    ELIZABETH: Male      - Denies asthma or inhaler use  - Tobacco History    History   Smoking Status    Never   Smokeless Tobacco    Never       GI  - Esophageal cancer s/p chemoradiation and now s/p EGD, diagnostic laparoscopy, J-tube placement. Patient now scheduled for minimally invasive esophagectomy as above.  - J-tube in place. Patient is not currently using. Plans to use after esophagectomy.   - Constipation, managed with colace. Hold DOS.    PONV Medium Risk  Total Score: 2           1 AN PONV: Patient is not a current smoker    1 AN PONV: Intended Post Op Opioids        /RENAL  - Baseline Creatinine  0.92  - History of prostate cancer s/p prostatectomy. PSA recently mildly elevated again, under surveillance.      ENDOCRINE    - BMI: Estimated body mass index is 32.32 kg/m  as calculated from the following:    Height as of this encounter: 1.816 m (5' 11.5\").    Weight as of this encounter: 106.6 kg (235 lb).  Obesity (BMI >30)  - No history of Diabetes Mellitus  - History of thyroid cancer s/p partial thyroidectomy    HEME  VTE High Risk 3%            Total Score: 8    VTE: Greater than 59 yrs old    VTE: Male    VTE: Current cancer      - No history of abnormal bleeding or antiplatelet use.  - Chronic anemia  Recommend perioperative use of blood conservation techniques intraoperatively and close monitoring for postoperative bleeding.  A type and screen has been ordered for this patient      Different anesthesia " methods/types have been discussed with the patient, but they are aware that the final plan will be decided by the assigned anesthesia provider on the date of service.    The patient is optimized for their procedure. AVS with information on surgery time/arrival time, meds and NPO status given by nursing staff. No further diagnostic testing indicated.      On the day of service:     Prep time: 10 minutes  Visit time: 25 minutes  Documentation time: 10 minutes  ------------------------------------------  Total time: 45 minutes      Sonam Barron PA-C  Preoperative Assessment Center  [facility]  Phone: [phone]  Fax: [phone]

## 2023-10-17 NOTE — NURSING NOTE
Chief Complaint   Patient presents with    Labs Only     Pt had labs drawn via  by RN.     Labs collected from venipuncture by RN. Vitals taken. Checked in for appointment(s).     Carlitos Escobar RN

## 2023-10-18 LAB — PREALB SERPL IA-MCNC: 34 MG/DL (ref 15–45)

## 2023-10-20 ENCOUNTER — PATIENT OUTREACH (OUTPATIENT)
Dept: SURGERY | Facility: CLINIC | Age: 73
End: 2023-10-20
Payer: COMMERCIAL

## 2023-10-20 DIAGNOSIS — C15.4 MALIGNANT NEOPLASM OF MIDDLE THIRD OF ESOPHAGUS (H): Primary | ICD-10-CM

## 2023-10-20 NOTE — PROGRESS NOTES
[facility]: Cancer Care Initial Note                                    Discussion with Patient:                                                      Call placed to pt regarding esophagectomy scheduled on 10/23/23 with Dr. King. The following topics were reviewed:    -7 to 8 hour surgery  -10 to 14 day hospitalization  -First few days in ICU  -diet test performed 7 days post op  -No solid food for 6 weeks   -Follow up appointment confirmed 11/20 with Inocencia GOOD and esophagram prior     Assessment:                                                      No assessment indicated    Intervention/Education provided during outreach:                                                       Pt voiced that he has a good understanding of j tube care and that he 'feels ready' for surgery. He feels that his questions were answered at his preop visit.     Signature:  Jessica Ku RN

## 2023-10-22 ASSESSMENT — LIFESTYLE VARIABLES: TOBACCO_USE: 0

## 2023-10-22 ASSESSMENT — COPD QUESTIONNAIRES: COPD: 0

## 2023-10-22 ASSESSMENT — ENCOUNTER SYMPTOMS: SEIZURES: 0

## 2023-10-22 NOTE — ANESTHESIA PREPROCEDURE EVALUATION
Anesthesia Pre-Procedure Evaluation    Patient: Alonso Rogel   MRN: [id number] : 1950        Procedure : Procedure(s):  MINIMALLY INVASIVE ESOPHAGECTOMY          Past Medical History:   Diagnosis Date    Anemia     Esophageal cancer (H)     History of prostate cancer     History of thyroid cancer     HLD (hyperlipidemia)     Neuropathy     SNHL (sensorineural hearing loss)       Past Surgical History:   Procedure Laterality Date    APPENDECTOMY      CRANIOTOMY      ESOPHAGOSCOPY, GASTROSCOPY, DUODENOSCOPY (EGD), COMBINED N/A 10/2/2023    Procedure: Esophagoscopy, gastroscopy, duodenoscopy (EGD);  Surgeon: Jennifer King MD;  Location: [facility] OR    LAPAROSCOPIC ASSISTED INSERTION TUBE JEJUNOSTOMY N/A 10/2/2023    Procedure: INSERTION, JEJUNOSTOMY TUBE, LAPAROSCOPY-ASSISTED;  Surgeon: Jennifer King MD;  Location: [facility] OR    LAPAROSCOPY DIAGNOSTIC (GENERAL) N/A 10/2/2023    Procedure: Diagnostic Laparoscopyand Lavage;  Surgeon: Jennifer King MD;  Location: [facility] OR    PROSTATECTOMY      THYROIDECTOMY, PARTIAL Left       Allergies   Allergen Reactions    Penicillins Rash     PN: LW Reaction: Reaction as a child       Social History     Tobacco Use    Smoking status: Never     Passive exposure: Never    Smokeless tobacco: Never   Substance Use Topics    Alcohol use: Not Currently     Comment: None since       Wt Readings from Last 1 Encounters:   10/17/23 106.6 kg (235 lb)        Anesthesia Evaluation   Pt has had prior anesthetic. Type: General.    History of anesthetic complications  - difficult airway.      ROS/MED HX  ENT/Pulmonary: Comment: SNHL   (-) tobacco use, asthma, COPD and sleep apnea   Neurologic:     (+)    peripheral neuropathy, - mild, right hand.                        (-) no seizures, no CVA and no TIA   Cardiovascular: Comment: Denies chest pain/pressure, CLIFTON, orthopnea, dizziness, palpitations, edema.      (+) Dyslipidemia - -   -  - -                                 Previous cardiac  testing   Echo: Date: Results:    Stress Test:  Date: 2002 Results:  CONCLUSION:    1.  Normal exercise echocardiogram with adequate heart rate and workload.   2.  No evidence for inducible ischemia.   3.  Image quality was poor.   4.  This test has diminished sensitivity due to poor imaging quality.     ECG Reviewed:  Date: 5/17/2022 Results:  Sinus rhythm   Normal ECG   When compared with ECG of 16-NOV-2022 16:16,   Premature ventricular complexes are no longer Present   Cath:  Date: Results:   (-) hypertension and CAD   METS/Exercise Tolerance: 4 - Raking leaves, gardening Comment: Currently limited due recent surgery and recovery from that.    Walks daily, [place] and does all yard work.    Hematologic:     (+)      anemia,       (-) history of blood clots and history of blood transfusion   Musculoskeletal:  - neg musculoskeletal ROS     GI/Hepatic: Comment: J-tube in place - not using for anything yet    (+)   esophageal disease (esophageal cancer),              (-) GERD and liver disease   Renal/Genitourinary:  - neg Renal ROS     Endo: Comment: S/p partial thyroidectomy    (+)               Obesity,    (-) Type II DM   Psychiatric/Substance Use:  - neg psychiatric ROS     Infectious Disease:  - neg infectious disease ROS     Malignancy:   (+) Malignancy, History of GI, Prostate and Other.Prostate CA Remission status post Surgery.  GI CA  Active status post.  Other CA Thyroid cancer Remission status post Surgery.    Other:  - neg other ROS          Physical Exam    Airway        Mallampati: II   TM distance: > 3 FB   Neck ROM: full   Mouth opening: > 3 cm    Respiratory Devices and Support         Dental       (+) Completely normal teeth      Cardiovascular          Rhythm and rate: regular and normal     Pulmonary           breath sounds clear to auscultation           OUTSIDE LABS:  CBC:   Lab Results   Component Value Date    WBC 4.3 10/17/2023    HGB 13.2 (L) 10/17/2023    HCT 39.1 (L)  "10/17/2023     10/17/2023     BMP:   Lab Results   Component Value Date     10/17/2023    POTASSIUM 4.2 10/17/2023    CHLORIDE 106 10/17/2023    CO2 24 10/17/2023    BUN 20.7 10/17/2023    CR 0.98 10/17/2023    GLC 83 10/17/2023     COAGS: No results found for: \"PTT\", \"INR\", \"FIBR\"  POC: No results found for: \"BGM\", \"HCG\", \"HCGS\"  HEPATIC:   Lab Results   Component Value Date    ALBUMIN 4.3 10/17/2023     OTHER:   Lab Results   Component Value Date    RENNY 9.3 10/17/2023       Anesthesia Plan    ASA Status:  3       Anesthesia Type: General.     - Airway: ETT   Induction: Intravenous.   Maintenance: Balanced.   Techniques and Equipment:     - Airway: Double lumen ETT, Video-Laryngoscope     - Lines/Monitors: 2nd IV, Arterial Line, BIS, Port in situ     - Blood: T&S     - Drips/Meds: Phenylephrine, Dexmed. infusion (intermittent ketamine boluses)     Consents    Anesthesia Plan(s) and associated risks, benefits, and realistic alternatives discussed. Questions answered and patient/representative(s) expressed understanding.     - Discussed:     - Discussed with:  Patient      - Extended Intubation/Ventilatory Support Discussed: Yes.      - Patient is DNR/DNI Status: No     Use of blood products discussed: Yes.     - Consented: consented to blood products            Reason for refusal: other.     Postoperative Care    Pain management: IV analgesics (intermittent ketamine, demed infusion).     - Plan for long acting post-op opioid use   PONV prophylaxis: Ondansetron (or other 5HT-3), Dexamethasone or Solumedrol     Comments:    Other Comments: I discussed the risks and benefits of general anesthesia with the patient.  Questions were sought and answered.      Ryan Vang MD  Attending Anesthesiologist                Tru Baugh MD  "

## 2023-10-23 ENCOUNTER — HOSPITAL ENCOUNTER (INPATIENT)
Facility: CLINIC | Age: 73
LOS: 9 days | Discharge: HOME HEALTH | DRG: 327 | End: 2023-11-01
Attending: STUDENT IN AN ORGANIZED HEALTH CARE EDUCATION/TRAINING PROGRAM | Admitting: STUDENT IN AN ORGANIZED HEALTH CARE EDUCATION/TRAINING PROGRAM
Payer: COMMERCIAL

## 2023-10-23 ENCOUNTER — ANESTHESIA (OUTPATIENT)
Dept: SURGERY | Facility: CLINIC | Age: 73
DRG: 327 | End: 2023-10-23
Payer: COMMERCIAL

## 2023-10-23 ENCOUNTER — APPOINTMENT (OUTPATIENT)
Dept: GENERAL RADIOLOGY | Facility: CLINIC | Age: 73
DRG: 327 | End: 2023-10-23
Attending: STUDENT IN AN ORGANIZED HEALTH CARE EDUCATION/TRAINING PROGRAM
Payer: COMMERCIAL

## 2023-10-23 DIAGNOSIS — C15.4 MALIGNANT NEOPLASM OF MIDDLE THIRD OF ESOPHAGUS (H): Primary | ICD-10-CM

## 2023-10-23 PROBLEM — C15.9 ESOPHAGEAL CANCER (H): Status: ACTIVE | Noted: 2023-10-23

## 2023-10-23 LAB
ABO/RH(D): NORMAL
ANION GAP SERPL CALCULATED.3IONS-SCNC: 14 MMOL/L (ref 7–15)
ANTIBODY SCREEN: NEGATIVE
BUN SERPL-MCNC: 20.1 MG/DL (ref 8–23)
CALCIUM SERPL-MCNC: 8.6 MG/DL (ref 8.8–10.2)
CHLORIDE SERPL-SCNC: 105 MMOL/L (ref 98–107)
CREAT SERPL-MCNC: 1.11 MG/DL (ref 0.67–1.17)
CREAT SERPL-MCNC: 1.13 MG/DL (ref 0.67–1.17)
DEPRECATED HCO3 PLAS-SCNC: 21 MMOL/L (ref 22–29)
EGFRCR SERPLBLD CKD-EPI 2021: 68 ML/MIN/1.73M2
EGFRCR SERPLBLD CKD-EPI 2021: 69 ML/MIN/1.73M2
ERYTHROCYTE [DISTWIDTH] IN BLOOD BY AUTOMATED COUNT: 12.1 % (ref 10–15)
GLUCOSE BLDC GLUCOMTR-MCNC: 177 MG/DL (ref 70–99)
GLUCOSE SERPL-MCNC: 179 MG/DL (ref 70–99)
HCT VFR BLD AUTO: 38.4 % (ref 40–53)
HGB BLD-MCNC: 13.3 G/DL (ref 13.3–17.7)
MAGNESIUM SERPL-MCNC: 2.1 MG/DL (ref 1.7–2.3)
MCH RBC QN AUTO: 32.5 PG (ref 26.5–33)
MCHC RBC AUTO-ENTMCNC: 34.6 G/DL (ref 31.5–36.5)
MCV RBC AUTO: 94 FL (ref 78–100)
PLATELET # BLD AUTO: 135 10E3/UL (ref 150–450)
POTASSIUM SERPL-SCNC: 4.4 MMOL/L (ref 3.4–5.3)
RBC # BLD AUTO: 4.09 10E6/UL (ref 4.4–5.9)
SODIUM SERPL-SCNC: 140 MMOL/L (ref 135–145)
SPECIMEN EXPIRATION DATE: NORMAL
WBC # BLD AUTO: 9.3 10E3/UL (ref 4–11)

## 2023-10-23 PROCEDURE — 32551 INSERTION OF CHEST TUBE: CPT | Mod: 58 | Performed by: STUDENT IN AN ORGANIZED HEALTH CARE EDUCATION/TRAINING PROGRAM

## 2023-10-23 PROCEDURE — 36415 COLL VENOUS BLD VENIPUNCTURE: CPT | Performed by: STUDENT IN AN ORGANIZED HEALTH CARE EDUCATION/TRAINING PROGRAM

## 2023-10-23 PROCEDURE — 250N000011 HC RX IP 250 OP 636: Mod: JZ | Performed by: STUDENT IN AN ORGANIZED HEALTH CARE EDUCATION/TRAINING PROGRAM

## 2023-10-23 PROCEDURE — 0DH68UZ INSERTION OF FEEDING DEVICE INTO STOMACH, VIA NATURAL OR ARTIFICIAL OPENING ENDOSCOPIC: ICD-10-PCS | Performed by: THORACIC SURGERY (CARDIOTHORACIC VASCULAR SURGERY)

## 2023-10-23 PROCEDURE — 999N000065 XR CHEST PORT 1 VIEW

## 2023-10-23 PROCEDURE — 82330 ASSAY OF CALCIUM: CPT

## 2023-10-23 PROCEDURE — 83605 ASSAY OF LACTIC ACID: CPT

## 2023-10-23 PROCEDURE — 250N000009 HC RX 250: Performed by: STUDENT IN AN ORGANIZED HEALTH CARE EDUCATION/TRAINING PROGRAM

## 2023-10-23 PROCEDURE — 258N000003 HC RX IP 258 OP 636: Performed by: STUDENT IN AN ORGANIZED HEALTH CARE EDUCATION/TRAINING PROGRAM

## 2023-10-23 PROCEDURE — 250N000011 HC RX IP 250 OP 636: Mod: JZ | Performed by: NURSE ANESTHETIST, CERTIFIED REGISTERED

## 2023-10-23 PROCEDURE — 200N000002 HC R&B ICU UMMC

## 2023-10-23 PROCEDURE — 88309 TISSUE EXAM BY PATHOLOGIST: CPT | Mod: 26 | Performed by: PATHOLOGY

## 2023-10-23 PROCEDURE — 71045 X-RAY EXAM CHEST 1 VIEW: CPT | Mod: 26 | Performed by: RADIOLOGY

## 2023-10-23 PROCEDURE — 999N000141 HC STATISTIC PRE-PROCEDURE NURSING ASSESSMENT: Performed by: STUDENT IN AN ORGANIZED HEALTH CARE EDUCATION/TRAINING PROGRAM

## 2023-10-23 PROCEDURE — 88305 TISSUE EXAM BY PATHOLOGIST: CPT | Mod: 26 | Performed by: PATHOLOGY

## 2023-10-23 PROCEDURE — 88331 PATH CONSLTJ SURG 1 BLK 1SPC: CPT | Mod: 26 | Performed by: PATHOLOGY

## 2023-10-23 PROCEDURE — 82565 ASSAY OF CREATININE: CPT | Performed by: STUDENT IN AN ORGANIZED HEALTH CARE EDUCATION/TRAINING PROGRAM

## 2023-10-23 PROCEDURE — 86850 RBC ANTIBODY SCREEN: CPT | Performed by: STUDENT IN AN ORGANIZED HEALTH CARE EDUCATION/TRAINING PROGRAM

## 2023-10-23 PROCEDURE — 710N000010 HC RECOVERY PHASE 1, LEVEL 2, PER MIN: Performed by: STUDENT IN AN ORGANIZED HEALTH CARE EDUCATION/TRAINING PROGRAM

## 2023-10-23 PROCEDURE — 250N000013 HC RX MED GY IP 250 OP 250 PS 637: Performed by: STUDENT IN AN ORGANIZED HEALTH CARE EDUCATION/TRAINING PROGRAM

## 2023-10-23 PROCEDURE — 07B74ZZ EXCISION OF THORAX LYMPHATIC, PERCUTANEOUS ENDOSCOPIC APPROACH: ICD-10-PCS | Performed by: STUDENT IN AN ORGANIZED HEALTH CARE EDUCATION/TRAINING PROGRAM

## 2023-10-23 PROCEDURE — 250N000011 HC RX IP 250 OP 636: Performed by: STUDENT IN AN ORGANIZED HEALTH CARE EDUCATION/TRAINING PROGRAM

## 2023-10-23 PROCEDURE — 272N000001 HC OR GENERAL SUPPLY STERILE: Performed by: STUDENT IN AN ORGANIZED HEALTH CARE EDUCATION/TRAINING PROGRAM

## 2023-10-23 PROCEDURE — 0W9B30Z DRAINAGE OF LEFT PLEURAL CAVITY WITH DRAINAGE DEVICE, PERCUTANEOUS APPROACH: ICD-10-PCS | Performed by: STUDENT IN AN ORGANIZED HEALTH CARE EDUCATION/TRAINING PROGRAM

## 2023-10-23 PROCEDURE — 250N000011 HC RX IP 250 OP 636: Mod: JZ

## 2023-10-23 PROCEDURE — 88332 PATH CONSLTJ SURG EA ADD BLK: CPT | Mod: 26 | Performed by: PATHOLOGY

## 2023-10-23 PROCEDURE — 80048 BASIC METABOLIC PNL TOTAL CA: CPT | Performed by: STUDENT IN AN ORGANIZED HEALTH CARE EDUCATION/TRAINING PROGRAM

## 2023-10-23 PROCEDURE — 0W9930Z DRAINAGE OF RIGHT PLEURAL CAVITY WITH DRAINAGE DEVICE, PERCUTANEOUS APPROACH: ICD-10-PCS | Performed by: STUDENT IN AN ORGANIZED HEALTH CARE EDUCATION/TRAINING PROGRAM

## 2023-10-23 PROCEDURE — 43287 ESPHG DSTL 2/3 W/LAPS MOBLJ: CPT | Mod: 58 | Performed by: STUDENT IN AN ORGANIZED HEALTH CARE EDUCATION/TRAINING PROGRAM

## 2023-10-23 PROCEDURE — 250N000009 HC RX 250: Performed by: NURSE ANESTHETIST, CERTIFIED REGISTERED

## 2023-10-23 PROCEDURE — 86901 BLOOD TYPING SEROLOGIC RH(D): CPT | Performed by: STUDENT IN AN ORGANIZED HEALTH CARE EDUCATION/TRAINING PROGRAM

## 2023-10-23 PROCEDURE — 250N000025 HC SEVOFLURANE, PER MIN: Performed by: STUDENT IN AN ORGANIZED HEALTH CARE EDUCATION/TRAINING PROGRAM

## 2023-10-23 PROCEDURE — 360N000078 HC SURGERY LEVEL 5, PER MIN: Performed by: STUDENT IN AN ORGANIZED HEALTH CARE EDUCATION/TRAINING PROGRAM

## 2023-10-23 PROCEDURE — 88307 TISSUE EXAM BY PATHOLOGIST: CPT | Mod: 26 | Performed by: PATHOLOGY

## 2023-10-23 PROCEDURE — 83735 ASSAY OF MAGNESIUM: CPT | Performed by: STUDENT IN AN ORGANIZED HEALTH CARE EDUCATION/TRAINING PROGRAM

## 2023-10-23 PROCEDURE — 32674 THORACOSCOPY LYMPH NODE EXC: CPT | Mod: 58 | Performed by: STUDENT IN AN ORGANIZED HEALTH CARE EDUCATION/TRAINING PROGRAM

## 2023-10-23 PROCEDURE — 43287 ESPHG DSTL 2/3 W/LAPS MOBLJ: CPT | Mod: 80 | Performed by: THORACIC SURGERY (CARDIOTHORACIC VASCULAR SURGERY)

## 2023-10-23 PROCEDURE — 88332 PATH CONSLTJ SURG EA ADD BLK: CPT | Mod: TC | Performed by: STUDENT IN AN ORGANIZED HEALTH CARE EDUCATION/TRAINING PROGRAM

## 2023-10-23 PROCEDURE — 85027 COMPLETE CBC AUTOMATED: CPT | Performed by: STUDENT IN AN ORGANIZED HEALTH CARE EDUCATION/TRAINING PROGRAM

## 2023-10-23 PROCEDURE — 88331 PATH CONSLTJ SURG 1 BLK 1SPC: CPT | Mod: TC | Performed by: STUDENT IN AN ORGANIZED HEALTH CARE EDUCATION/TRAINING PROGRAM

## 2023-10-23 PROCEDURE — 04L24ZZ OCCLUSION OF GASTRIC ARTERY, PERCUTANEOUS ENDOSCOPIC APPROACH: ICD-10-PCS | Performed by: STUDENT IN AN ORGANIZED HEALTH CARE EDUCATION/TRAINING PROGRAM

## 2023-10-23 PROCEDURE — 0DX64Z5 TRANSFER STOMACH TO ESOPHAGUS, PERCUTANEOUS ENDOSCOPIC APPROACH: ICD-10-PCS | Performed by: STUDENT IN AN ORGANIZED HEALTH CARE EDUCATION/TRAINING PROGRAM

## 2023-10-23 PROCEDURE — 99221 1ST HOSP IP/OBS SF/LOW 40: CPT | Mod: 24 | Performed by: EMERGENCY MEDICINE

## 2023-10-23 PROCEDURE — 370N000017 HC ANESTHESIA TECHNICAL FEE, PER MIN: Performed by: STUDENT IN AN ORGANIZED HEALTH CARE EDUCATION/TRAINING PROGRAM

## 2023-10-23 RX ORDER — INDOCYANINE GREEN AND WATER 25 MG
KIT INJECTION PRN
Status: DISCONTINUED | OUTPATIENT
Start: 2023-10-23 | End: 2023-10-23

## 2023-10-23 RX ORDER — PROCHLORPERAZINE MALEATE 5 MG
5 TABLET ORAL EVERY 6 HOURS PRN
Status: DISCONTINUED | OUTPATIENT
Start: 2023-10-23 | End: 2023-11-01 | Stop reason: HOSPADM

## 2023-10-23 RX ORDER — OXYCODONE HYDROCHLORIDE 5 MG/1
5 TABLET ORAL EVERY 4 HOURS PRN
Status: DISCONTINUED | OUTPATIENT
Start: 2023-10-23 | End: 2023-10-25

## 2023-10-23 RX ORDER — CEFAZOLIN SODIUM 2 G/100ML
2 INJECTION, SOLUTION INTRAVENOUS EVERY 8 HOURS
Qty: 300 ML | Refills: 0 | Status: COMPLETED | OUTPATIENT
Start: 2023-10-24 | End: 2023-10-24

## 2023-10-23 RX ORDER — DEXTROSE MONOHYDRATE, SODIUM CHLORIDE, AND POTASSIUM CHLORIDE 50; 1.49; 4.5 G/1000ML; G/1000ML; G/1000ML
INJECTION, SOLUTION INTRAVENOUS CONTINUOUS
Status: DISCONTINUED | OUTPATIENT
Start: 2023-10-23 | End: 2023-10-26

## 2023-10-23 RX ORDER — METHOCARBAMOL 500 MG/1
500 TABLET, FILM COATED ORAL EVERY 6 HOURS PRN
Status: DISCONTINUED | OUTPATIENT
Start: 2023-10-23 | End: 2023-10-25

## 2023-10-23 RX ORDER — BISACODYL 10 MG
10 SUPPOSITORY, RECTAL RECTAL DAILY PRN
Status: DISCONTINUED | OUTPATIENT
Start: 2023-10-23 | End: 2023-11-01 | Stop reason: HOSPADM

## 2023-10-23 RX ORDER — FENTANYL CITRATE 50 UG/ML
25 INJECTION, SOLUTION INTRAMUSCULAR; INTRAVENOUS EVERY 5 MIN PRN
Status: DISCONTINUED | OUTPATIENT
Start: 2023-10-23 | End: 2023-10-23 | Stop reason: HOSPADM

## 2023-10-23 RX ORDER — SODIUM CHLORIDE, SODIUM LACTATE, POTASSIUM CHLORIDE, CALCIUM CHLORIDE 600; 310; 30; 20 MG/100ML; MG/100ML; MG/100ML; MG/100ML
INJECTION, SOLUTION INTRAVENOUS CONTINUOUS PRN
Status: DISCONTINUED | OUTPATIENT
Start: 2023-10-23 | End: 2023-10-23

## 2023-10-23 RX ORDER — SODIUM CHLORIDE, SODIUM LACTATE, POTASSIUM CHLORIDE, CALCIUM CHLORIDE 600; 310; 30; 20 MG/100ML; MG/100ML; MG/100ML; MG/100ML
INJECTION, SOLUTION INTRAVENOUS CONTINUOUS
Status: DISCONTINUED | OUTPATIENT
Start: 2023-10-23 | End: 2023-10-23 | Stop reason: HOSPADM

## 2023-10-23 RX ORDER — ONDANSETRON 4 MG/1
4 TABLET, ORALLY DISINTEGRATING ORAL EVERY 6 HOURS PRN
Status: DISCONTINUED | OUTPATIENT
Start: 2023-10-23 | End: 2023-11-01 | Stop reason: HOSPADM

## 2023-10-23 RX ORDER — ONDANSETRON 2 MG/ML
4 INJECTION INTRAMUSCULAR; INTRAVENOUS EVERY 30 MIN PRN
Status: DISCONTINUED | OUTPATIENT
Start: 2023-10-23 | End: 2023-10-23 | Stop reason: HOSPADM

## 2023-10-23 RX ORDER — SODIUM CHLORIDE, SODIUM GLUCONATE, SODIUM ACETATE, POTASSIUM CHLORIDE AND MAGNESIUM CHLORIDE 526; 502; 368; 37; 30 MG/100ML; MG/100ML; MG/100ML; MG/100ML; MG/100ML
INJECTION, SOLUTION INTRAVENOUS CONTINUOUS PRN
Status: DISCONTINUED | OUTPATIENT
Start: 2023-10-23 | End: 2023-10-23

## 2023-10-23 RX ORDER — PROPOFOL 10 MG/ML
INJECTION, EMULSION INTRAVENOUS PRN
Status: DISCONTINUED | OUTPATIENT
Start: 2023-10-23 | End: 2023-10-23

## 2023-10-23 RX ORDER — BUPIVACAINE HYDROCHLORIDE AND EPINEPHRINE 2.5; 5 MG/ML; UG/ML
INJECTION, SOLUTION INFILTRATION; PERINEURAL PRN
Status: DISCONTINUED | OUTPATIENT
Start: 2023-10-23 | End: 2023-10-23 | Stop reason: HOSPADM

## 2023-10-23 RX ORDER — POLYETHYLENE GLYCOL 3350 17 G/17G
17 POWDER, FOR SOLUTION ORAL DAILY
Status: DISCONTINUED | OUTPATIENT
Start: 2023-10-23 | End: 2023-10-23

## 2023-10-23 RX ORDER — LIDOCAINE HYDROCHLORIDE 20 MG/ML
INJECTION, SOLUTION INFILTRATION; PERINEURAL PRN
Status: DISCONTINUED | OUTPATIENT
Start: 2023-10-23 | End: 2023-10-23

## 2023-10-23 RX ORDER — DEXTROSE MONOHYDRATE 25 G/50ML
25-50 INJECTION, SOLUTION INTRAVENOUS
Status: DISCONTINUED | OUTPATIENT
Start: 2023-10-23 | End: 2023-11-01 | Stop reason: HOSPADM

## 2023-10-23 RX ORDER — ENOXAPARIN SODIUM 100 MG/ML
40 INJECTION SUBCUTANEOUS EVERY 24 HOURS
Status: DISCONTINUED | OUTPATIENT
Start: 2023-10-24 | End: 2023-11-01 | Stop reason: HOSPADM

## 2023-10-23 RX ORDER — NALOXONE HYDROCHLORIDE 0.4 MG/ML
0.4 INJECTION, SOLUTION INTRAMUSCULAR; INTRAVENOUS; SUBCUTANEOUS
Status: DISCONTINUED | OUTPATIENT
Start: 2023-10-23 | End: 2023-11-01 | Stop reason: HOSPADM

## 2023-10-23 RX ORDER — DEXMEDETOMIDINE HYDROCHLORIDE 4 UG/ML
INJECTION, SOLUTION INTRAVENOUS CONTINUOUS PRN
Status: DISCONTINUED | OUTPATIENT
Start: 2023-10-23 | End: 2023-10-23

## 2023-10-23 RX ORDER — CLINDAMYCIN PHOSPHATE 900 MG/50ML
900 INJECTION, SOLUTION INTRAVENOUS EVERY 8 HOURS
Status: DISCONTINUED | OUTPATIENT
Start: 2023-10-23 | End: 2023-10-23

## 2023-10-23 RX ORDER — LABETALOL HYDROCHLORIDE 5 MG/ML
10 INJECTION, SOLUTION INTRAVENOUS
Status: DISCONTINUED | OUTPATIENT
Start: 2023-10-23 | End: 2023-10-23 | Stop reason: HOSPADM

## 2023-10-23 RX ORDER — ONDANSETRON 4 MG/1
4 TABLET, ORALLY DISINTEGRATING ORAL EVERY 30 MIN PRN
Status: DISCONTINUED | OUTPATIENT
Start: 2023-10-23 | End: 2023-10-23 | Stop reason: HOSPADM

## 2023-10-23 RX ORDER — HYDROMORPHONE HCL IN WATER/PF 6 MG/30 ML
0.4 PATIENT CONTROLLED ANALGESIA SYRINGE INTRAVENOUS EVERY 5 MIN PRN
Status: DISCONTINUED | OUTPATIENT
Start: 2023-10-23 | End: 2023-10-23 | Stop reason: HOSPADM

## 2023-10-23 RX ORDER — ACETAMINOPHEN 325 MG/1
650 TABLET ORAL EVERY 4 HOURS PRN
Status: DISCONTINUED | OUTPATIENT
Start: 2023-10-26 | End: 2023-10-29

## 2023-10-23 RX ORDER — HYDRALAZINE HYDROCHLORIDE 20 MG/ML
2.5-5 INJECTION INTRAMUSCULAR; INTRAVENOUS EVERY 10 MIN PRN
Status: DISCONTINUED | OUTPATIENT
Start: 2023-10-23 | End: 2023-10-23 | Stop reason: HOSPADM

## 2023-10-23 RX ORDER — LIDOCAINE 40 MG/G
CREAM TOPICAL
Status: DISCONTINUED | OUTPATIENT
Start: 2023-10-23 | End: 2023-10-23 | Stop reason: HOSPADM

## 2023-10-23 RX ORDER — LIDOCAINE 40 MG/G
CREAM TOPICAL
Status: DISCONTINUED | OUTPATIENT
Start: 2023-10-23 | End: 2023-11-01 | Stop reason: HOSPADM

## 2023-10-23 RX ORDER — EPHEDRINE SULFATE 50 MG/ML
INJECTION, SOLUTION INTRAMUSCULAR; INTRAVENOUS; SUBCUTANEOUS PRN
Status: DISCONTINUED | OUTPATIENT
Start: 2023-10-23 | End: 2023-10-23

## 2023-10-23 RX ORDER — NALOXONE HYDROCHLORIDE 0.4 MG/ML
0.2 INJECTION, SOLUTION INTRAMUSCULAR; INTRAVENOUS; SUBCUTANEOUS
Status: DISCONTINUED | OUTPATIENT
Start: 2023-10-23 | End: 2023-11-01 | Stop reason: HOSPADM

## 2023-10-23 RX ORDER — CEFAZOLIN SODIUM 1 G/3ML
INJECTION, POWDER, FOR SOLUTION INTRAMUSCULAR; INTRAVENOUS PRN
Status: DISCONTINUED | OUTPATIENT
Start: 2023-10-23 | End: 2023-10-23

## 2023-10-23 RX ORDER — GABAPENTIN 100 MG/1
100 CAPSULE ORAL AT BEDTIME
Status: DISCONTINUED | OUTPATIENT
Start: 2023-10-23 | End: 2023-11-01 | Stop reason: HOSPADM

## 2023-10-23 RX ORDER — POLYETHYLENE GLYCOL 3350 17 G/17G
17 POWDER, FOR SOLUTION ORAL DAILY
Status: DISCONTINUED | OUTPATIENT
Start: 2023-10-24 | End: 2023-10-28

## 2023-10-23 RX ORDER — ONDANSETRON 2 MG/ML
4 INJECTION INTRAMUSCULAR; INTRAVENOUS EVERY 6 HOURS PRN
Status: DISCONTINUED | OUTPATIENT
Start: 2023-10-23 | End: 2023-11-01 | Stop reason: HOSPADM

## 2023-10-23 RX ORDER — HYDROMORPHONE HCL IN WATER/PF 6 MG/30 ML
0.2 PATIENT CONTROLLED ANALGESIA SYRINGE INTRAVENOUS EVERY 5 MIN PRN
Status: DISCONTINUED | OUTPATIENT
Start: 2023-10-23 | End: 2023-10-23 | Stop reason: HOSPADM

## 2023-10-23 RX ORDER — FENTANYL CITRATE 50 UG/ML
50 INJECTION, SOLUTION INTRAMUSCULAR; INTRAVENOUS EVERY 5 MIN PRN
Status: DISCONTINUED | OUTPATIENT
Start: 2023-10-23 | End: 2023-10-23 | Stop reason: HOSPADM

## 2023-10-23 RX ORDER — NICOTINE POLACRILEX 4 MG
15-30 LOZENGE BUCCAL
Status: DISCONTINUED | OUTPATIENT
Start: 2023-10-23 | End: 2023-11-01 | Stop reason: HOSPADM

## 2023-10-23 RX ORDER — ACETAMINOPHEN 325 MG/1
975 TABLET ORAL EVERY 8 HOURS SCHEDULED
Status: COMPLETED | OUTPATIENT
Start: 2023-10-23 | End: 2023-10-26

## 2023-10-23 RX ORDER — DEXAMETHASONE SODIUM PHOSPHATE 4 MG/ML
INJECTION, SOLUTION INTRA-ARTICULAR; INTRALESIONAL; INTRAMUSCULAR; INTRAVENOUS; SOFT TISSUE PRN
Status: DISCONTINUED | OUTPATIENT
Start: 2023-10-23 | End: 2023-10-23

## 2023-10-23 RX ORDER — AMOXICILLIN 250 MG
1 CAPSULE ORAL 2 TIMES DAILY
Status: DISCONTINUED | OUTPATIENT
Start: 2023-10-23 | End: 2023-10-23

## 2023-10-23 RX ORDER — KETAMINE HYDROCHLORIDE 10 MG/ML
INJECTION INTRAMUSCULAR; INTRAVENOUS PRN
Status: DISCONTINUED | OUTPATIENT
Start: 2023-10-23 | End: 2023-10-23

## 2023-10-23 RX ORDER — CLINDAMYCIN PHOSPHATE 900 MG/50ML
900 INJECTION, SOLUTION INTRAVENOUS SEE ADMIN INSTRUCTIONS
Status: DISCONTINUED | OUTPATIENT
Start: 2023-10-23 | End: 2023-10-23 | Stop reason: HOSPADM

## 2023-10-23 RX ORDER — CLINDAMYCIN PHOSPHATE 900 MG/50ML
900 INJECTION, SOLUTION INTRAVENOUS
Status: COMPLETED | OUTPATIENT
Start: 2023-10-23 | End: 2023-10-23

## 2023-10-23 RX ORDER — OXYCODONE HYDROCHLORIDE 10 MG/1
10 TABLET ORAL EVERY 4 HOURS PRN
Status: DISCONTINUED | OUTPATIENT
Start: 2023-10-23 | End: 2023-10-25

## 2023-10-23 RX ORDER — LIDOCAINE 4 G/G
1 PATCH TOPICAL
Status: DISCONTINUED | OUTPATIENT
Start: 2023-10-24 | End: 2023-11-01 | Stop reason: HOSPADM

## 2023-10-23 RX ORDER — AMOXICILLIN 250 MG
1 CAPSULE ORAL 2 TIMES DAILY
Status: DISCONTINUED | OUTPATIENT
Start: 2023-10-23 | End: 2023-11-01 | Stop reason: HOSPADM

## 2023-10-23 RX ORDER — ONDANSETRON 2 MG/ML
INJECTION INTRAMUSCULAR; INTRAVENOUS PRN
Status: DISCONTINUED | OUTPATIENT
Start: 2023-10-23 | End: 2023-10-23

## 2023-10-23 RX ORDER — FENTANYL CITRATE 50 UG/ML
INJECTION, SOLUTION INTRAMUSCULAR; INTRAVENOUS PRN
Status: DISCONTINUED | OUTPATIENT
Start: 2023-10-23 | End: 2023-10-23

## 2023-10-23 RX ORDER — ENOXAPARIN SODIUM 100 MG/ML
40 INJECTION SUBCUTANEOUS
Status: COMPLETED | OUTPATIENT
Start: 2023-10-23 | End: 2023-10-23

## 2023-10-23 RX ORDER — AMOXICILLIN 250 MG
2 CAPSULE ORAL 2 TIMES DAILY
Status: DISCONTINUED | OUTPATIENT
Start: 2023-10-23 | End: 2023-11-01 | Stop reason: HOSPADM

## 2023-10-23 RX ORDER — ALBUTEROL SULFATE 90 UG/1
4 AEROSOL, METERED RESPIRATORY (INHALATION) 4 TIMES DAILY
Status: DISCONTINUED | OUTPATIENT
Start: 2023-10-23 | End: 2023-11-01 | Stop reason: HOSPADM

## 2023-10-23 RX ADMIN — FENTANYL CITRATE 50 MCG: 50 INJECTION INTRAMUSCULAR; INTRAVENOUS at 07:47

## 2023-10-23 RX ADMIN — Medication 10 MG: at 15:30

## 2023-10-23 RX ADMIN — SODIUM CHLORIDE, SODIUM GLUCONATE, SODIUM ACETATE, POTASSIUM CHLORIDE AND MAGNESIUM CHLORIDE: 526; 502; 368; 37; 30 INJECTION, SOLUTION INTRAVENOUS at 08:00

## 2023-10-23 RX ADMIN — ALBUTEROL SULFATE 4 PUFF: 90 AEROSOL, METERED RESPIRATORY (INHALATION) at 22:09

## 2023-10-23 RX ADMIN — Medication 10 MG: at 16:30

## 2023-10-23 RX ADMIN — SUGAMMADEX 200 MG: 100 INJECTION, SOLUTION INTRAVENOUS at 17:37

## 2023-10-23 RX ADMIN — Medication 10 MG: at 14:26

## 2023-10-23 RX ADMIN — GABAPENTIN 100 MG: 100 CAPSULE ORAL at 21:54

## 2023-10-23 RX ADMIN — ENOXAPARIN SODIUM 40 MG: 40 INJECTION SUBCUTANEOUS at 07:06

## 2023-10-23 RX ADMIN — HYDROMORPHONE HYDROCHLORIDE 0.4 MG: 0.2 INJECTION, SOLUTION INTRAMUSCULAR; INTRAVENOUS; SUBCUTANEOUS at 20:14

## 2023-10-23 RX ADMIN — LIDOCAINE HYDROCHLORIDE 100 MG: 20 INJECTION, SOLUTION INFILTRATION; PERINEURAL at 07:45

## 2023-10-23 RX ADMIN — EPHEDRINE SULFATE 10 MG: 5 INJECTION INTRAVENOUS at 11:14

## 2023-10-23 RX ADMIN — FENTANYL CITRATE 50 MCG: 50 INJECTION INTRAMUSCULAR; INTRAVENOUS at 17:55

## 2023-10-23 RX ADMIN — Medication 10 MG: at 12:00

## 2023-10-23 RX ADMIN — EPHEDRINE SULFATE 5 MG: 5 INJECTION INTRAVENOUS at 10:17

## 2023-10-23 RX ADMIN — PHENYLEPHRINE HYDROCHLORIDE 100 MCG: 10 INJECTION INTRAVENOUS at 08:12

## 2023-10-23 RX ADMIN — Medication 50 MG: at 09:56

## 2023-10-23 RX ADMIN — Medication 20 MG: at 14:27

## 2023-10-23 RX ADMIN — HYDROMORPHONE HYDROCHLORIDE 0.5 MG: 1 INJECTION, SOLUTION INTRAMUSCULAR; INTRAVENOUS; SUBCUTANEOUS at 17:41

## 2023-10-23 RX ADMIN — SODIUM CHLORIDE, POTASSIUM CHLORIDE, SODIUM LACTATE AND CALCIUM CHLORIDE: 600; 310; 30; 20 INJECTION, SOLUTION INTRAVENOUS at 07:34

## 2023-10-23 RX ADMIN — FENTANYL CITRATE 50 MCG: 50 INJECTION, SOLUTION INTRAMUSCULAR; INTRAVENOUS at 18:57

## 2023-10-23 RX ADMIN — Medication 10 MG: at 14:25

## 2023-10-23 RX ADMIN — Medication 30 MG: at 07:59

## 2023-10-23 RX ADMIN — Medication: at 19:03

## 2023-10-23 RX ADMIN — Medication 50 MG: at 08:36

## 2023-10-23 RX ADMIN — CEFAZOLIN 2 G: 1 INJECTION, POWDER, FOR SOLUTION INTRAMUSCULAR; INTRAVENOUS at 17:10

## 2023-10-23 RX ADMIN — Medication 50 MG: at 07:47

## 2023-10-23 RX ADMIN — ONDANSETRON 4 MG: 2 INJECTION INTRAMUSCULAR; INTRAVENOUS at 17:37

## 2023-10-23 RX ADMIN — Medication 10 MG: at 09:27

## 2023-10-23 RX ADMIN — INDOCYANINE GREEN AND WATER 25 MG: KIT at 10:57

## 2023-10-23 RX ADMIN — DEXMEDETOMIDINE HYDROCHLORIDE 0.2 MCG/KG/HR: 4 INJECTION, SOLUTION INTRAVENOUS at 08:33

## 2023-10-23 RX ADMIN — FENTANYL CITRATE 50 MCG: 50 INJECTION INTRAMUSCULAR; INTRAVENOUS at 18:03

## 2023-10-23 RX ADMIN — HYDROMORPHONE HYDROCHLORIDE 0.5 MG: 1 INJECTION, SOLUTION INTRAMUSCULAR; INTRAVENOUS; SUBCUTANEOUS at 14:40

## 2023-10-23 RX ADMIN — PROPOFOL 140 MG: 10 INJECTION, EMULSION INTRAVENOUS at 07:45

## 2023-10-23 RX ADMIN — Medication 30 MG: at 11:29

## 2023-10-23 RX ADMIN — FENTANYL CITRATE 50 MCG: 50 INJECTION, SOLUTION INTRAMUSCULAR; INTRAVENOUS at 18:33

## 2023-10-23 RX ADMIN — FENTANYL CITRATE 50 MCG: 50 INJECTION, SOLUTION INTRAMUSCULAR; INTRAVENOUS at 18:27

## 2023-10-23 RX ADMIN — EPHEDRINE SULFATE 5 MG: 5 INJECTION INTRAVENOUS at 08:37

## 2023-10-23 RX ADMIN — PHENYLEPHRINE HYDROCHLORIDE 100 MCG: 10 INJECTION INTRAVENOUS at 11:12

## 2023-10-23 RX ADMIN — HYDROMORPHONE HYDROCHLORIDE 0.2 MG: 0.2 INJECTION, SOLUTION INTRAMUSCULAR; INTRAVENOUS; SUBCUTANEOUS at 19:30

## 2023-10-23 RX ADMIN — DEXAMETHASONE SODIUM PHOSPHATE 10 MG: 4 INJECTION, SOLUTION INTRA-ARTICULAR; INTRALESIONAL; INTRAMUSCULAR; INTRAVENOUS; SOFT TISSUE at 08:38

## 2023-10-23 RX ADMIN — PROPOFOL 20 MG: 10 INJECTION, EMULSION INTRAVENOUS at 11:20

## 2023-10-23 RX ADMIN — EPHEDRINE SULFATE 5 MG: 5 INJECTION INTRAVENOUS at 11:01

## 2023-10-23 RX ADMIN — HYDROMORPHONE HYDROCHLORIDE 0.2 MG: 0.2 INJECTION, SOLUTION INTRAMUSCULAR; INTRAVENOUS; SUBCUTANEOUS at 19:29

## 2023-10-23 RX ADMIN — SODIUM CHLORIDE, SODIUM GLUCONATE, SODIUM ACETATE, POTASSIUM CHLORIDE AND MAGNESIUM CHLORIDE: 526; 502; 368; 37; 30 INJECTION, SOLUTION INTRAVENOUS at 14:16

## 2023-10-23 RX ADMIN — CEFAZOLIN SODIUM 2 G: 2 INJECTION, SOLUTION INTRAVENOUS at 23:36

## 2023-10-23 RX ADMIN — FENTANYL CITRATE 50 MCG: 50 INJECTION INTRAMUSCULAR; INTRAVENOUS at 07:45

## 2023-10-23 RX ADMIN — PHENYLEPHRINE HYDROCHLORIDE 200 MCG: 10 INJECTION INTRAVENOUS at 08:38

## 2023-10-23 RX ADMIN — SENNOSIDES AND DOCUSATE SODIUM 1 TABLET: 8.6; 5 TABLET ORAL at 21:54

## 2023-10-23 RX ADMIN — PHENYLEPHRINE HYDROCHLORIDE 0.1 MCG/KG/MIN: 10 INJECTION INTRAVENOUS at 08:31

## 2023-10-23 RX ADMIN — Medication 10 MG: at 16:33

## 2023-10-23 RX ADMIN — CEFAZOLIN 2 G: 1 INJECTION, POWDER, FOR SOLUTION INTRAMUSCULAR; INTRAVENOUS at 13:10

## 2023-10-23 RX ADMIN — CLINDAMYCIN PHOSPHATE 900 MG: 900 INJECTION, SOLUTION INTRAVENOUS at 07:08

## 2023-10-23 RX ADMIN — HYDROMORPHONE HYDROCHLORIDE 0.2 MG: 0.2 INJECTION, SOLUTION INTRAMUSCULAR; INTRAVENOUS; SUBCUTANEOUS at 19:10

## 2023-10-23 RX ADMIN — Medication 10 MG: at 10:29

## 2023-10-23 RX ADMIN — FENTANYL CITRATE 50 MCG: 50 INJECTION, SOLUTION INTRAMUSCULAR; INTRAVENOUS at 18:44

## 2023-10-23 RX ADMIN — Medication 20 MG: at 15:22

## 2023-10-23 RX ADMIN — ACETAMINOPHEN 975 MG: 325 TABLET, FILM COATED ORAL at 21:54

## 2023-10-23 RX ADMIN — Medication 20 MG: at 13:14

## 2023-10-23 RX ADMIN — Medication 10 MG: at 13:15

## 2023-10-23 RX ADMIN — POTASSIUM CHLORIDE, DEXTROSE MONOHYDRATE AND SODIUM CHLORIDE: 150; 5; 450 INJECTION, SOLUTION INTRAVENOUS at 21:58

## 2023-10-23 RX ADMIN — HYDROMORPHONE HYDROCHLORIDE 0.2 MG: 0.2 INJECTION, SOLUTION INTRAMUSCULAR; INTRAVENOUS; SUBCUTANEOUS at 19:11

## 2023-10-23 ASSESSMENT — ACTIVITIES OF DAILY LIVING (ADL)
ADLS_ACUITY_SCORE: 20
ADLS_ACUITY_SCORE: 28
ADLS_ACUITY_SCORE: 20

## 2023-10-23 NOTE — OP NOTE
Preoperative diagnosis: Esophageal cancer    Postoperative diagnosis: The same as preoperative diagnosis    Procedure:   RIGHT VATS esophagogastric anastomosis  Esophagogastroscopy    Anesthesia: General    Surgeon: Ramiro Lozano (co-surgeon for a portion of the operation, refer to Dr. King's operative report)    Resident surgeon: Ina Burnette    Procedure:  Help Dr. King with the procedure and helped her perform the anastomosis and also performed esophagogastroscopy at the end.  For the anastomosis I performed about half of the anastomosis performing a running suture and Dr. King finished the rest of the anastomosis.  At the end I performed an esophagogastroscopy and placed a nasal gastric tube over a wire.

## 2023-10-23 NOTE — ANESTHESIA PROCEDURE NOTES
Airway       Patient location during procedure: OR       Procedure Start/Stop Times: 10/23/2023 7:51 AM  Staff -        Anesthesiologist:  Ryan Vang MD       Resident/Fellow: Tru Baugh MD       Performed By: resident  Consent for Airway        Urgency: elective  Indications and Patient Condition       Indications for airway management: tomy-procedural       Induction type:intravenous       Mask difficulty assessment: 1 - vent by mask    Final Airway Details       Final airway type: endotracheal airway       Successful airway: ETT - double lumen left  Endotracheal Airway Details        Cuffed: yes       Successful intubation technique: video laryngoscopy       VL Blade Size: Stanton 3       Grade View of Cords: 1       Adjucts: stylet       Position: Center       Measured from: gums/teeth       Secured at (cm): 29       Bite block used: None       ETT Double lumen (fr): 39    Post intubation assessment        Placement verified by: capnometry, equal breath sounds and chest rise        Number of attempts at approach: 1       Number of other approaches attempted: 0       Secured with: silk tape       Ease of procedure: easy       Dentition: Intact and Unchanged    Medication(s) Administered   Medication Administration Time: 10/23/2023 7:51 AM

## 2023-10-23 NOTE — ANESTHESIA CARE TRANSFER NOTE
Patient: Alonso Rogel    Procedure: Procedure(s):  MINIMALLY INVASIVE CECIL MARY ESOPHAGECTOMY, Esophagogastroduodenoscopy, Flexible Bronchoscopy, Right Video-Assisted Thoracic Surgery, CHEST TUBE PLACEMENT X2       Diagnosis: Malignant neoplasm of esophagus, unspecified location (H) [C15.9]  Diagnosis Additional Information: No value filed.    Anesthesia Type:   General     Note:    Oropharynx: spontaneously breathing  Level of Consciousness: awake and drowsy  Oxygen Supplementation: face mask    Independent Airway: airway patency satisfactory and stable  Dentition: dentition unchanged  Vital Signs Stable: post-procedure vital signs reviewed and stable  Report to RN Given: handoff report given  Patient transferred to: PACU    Handoff Report: Identifed the Patient, Identified the Reponsible Provider, Reviewed the pertinent medical history, Discussed the surgical course, Reviewed Intra-OP anesthesia mangement and issues during anesthesia, Set expectations for post-procedure period and Allowed opportunity for questions and acknowledgement of understanding      Vitals:  Vitals Value Taken Time   /72    Temp     Pulse 88 10/23/23 1818   Resp 16 10/23/23 1818   SpO2 95 % 10/23/23 1818   Vitals shown include unfiled device data.    Electronically Signed By: LATISHA Mcneal CRNA  October 23, 2023  6:19 PM

## 2023-10-23 NOTE — CONSULTS
SURGICAL ICU CONSULT NOTE  10/23/2023      PRIMARY TEAM: Thoracic Surgery  PRIMARY PHYSICIAN: Fernando  REASON FOR CRITICAL CARE ADMISSION: Co-management of patient s/p esophagectomy   ADMITTING PHYSICIAN: Foreign  Date of Service (when I saw the patient): 10/23/2023    ASSESSMENT:  Alonso Rogel is a 73 year old male who was admitted on 10/23/2023 s/p stage 2 of laparoscopic esophagectomy with bilateral chest tube placement, with the first stage involving J-tube placement on 10/2/2023. Admitted to SICU post-operatively for hemodynamic monitoring.    PLAN:  Neurological:  # Acute post-operative pain   - Monitor neurological status. Delirium preventions and precautions.   - Pain: Dilaudid PCA, oxycodone 5-10 q4h, robaxin 500 q6h prn, cynthia and prn tylenol, gabapentin, lidocaine patch   - Sedation plan: None    Pulmonary:   # Post-Operative respiratory support   - Supplemental oxygen to keep saturation above 92 %. Currently on 2L NC  - Incentive spirometer every 15- 30 minutes when awake.  - Albuterol QID  - Bilateral chest tubes to water seal    Cardiovascular:    #Shock - hypovolemic  #Hyperlipidemia  - Monitor hemodynamic status. MAP > 65. Try to avoid pressors in setting of new anastomosis.   - PTA simvastatin held overnight    Gastroenterology/Nutrition:  # Esophageal Adenocarcinoma s/p Esophagectomy  # Protein calorie deficit malnutrition   - Tube feeds: held overnight.  - Meds via J tube  - NGT to LIS, flush q6h with 30 mL room temp water   - No indication for parenteral nutrition.  - Bowel regimen: Miralax, senna, dulcolax, milk of magnesia  - PRN zofran, compazine for nausea    Fluids/Electrolytes:   - D5 1/2 NS + K for IV fluid hydration  - Daily BMP, Mg, Phos    Renal:  - Baseline Cr 0.98, post-op 1.13. Will continue to monitor  - Urine output is adequate . Will continue to monitor intake and output.    Endocrine:  # Stress hyperglycemia   - No management indication. Will continue to monitor for need for  SSI    ID:  - Post-op ancef to continue through tomorrow    Heme:     # Acute blood loss anemia   # Anemia of critical illness   - Hemoglobin 13.3 post-op  - EBL 100cc  - Transfuse if hgb <7.0 or signs/symptoms of hypoperfusion. Monitor and trend.   - Daily CBC    Musculoskeletal:  # Weakness and deconditioning of critical illness   - Physical and occupational therapy consult     Skin:  - Continue assessments for wounds    General Cares/Prophylaxis:    DVT Prophylaxis: Enoxaparin (Lovenox) SQ  GI Prophylaxis: None  Restraints: Restraints for medical healing needed: NO    Lines/ tubes/ drains:  - R chest wall port  - 3x PIV  - L radial A-line  - Baca  - L and R chest tubes (WS)  - NGT  - J-tube      Disposition:  - SICU    Patient seen, findings and plan discussed with surgical ICU staff, Dr. Carrasquillo.    Tay Mendieta MD  Surgery Resident  - - - - - - - - - - - - - - - - - - - - - - - - - - - - - - - - - - - - - - - - - - - - - -   HISTORY PRESENTING ILLNESS:   Alonso Copeland is a 72 y/o M with a PMHx of HLD, anemia, neuropathy, sensorineural hearing loss thyroid cancer and prostate cancer hx. He was found to have esophageal cancer in May after presenting with symptoms of food impaction. He underwent chemoradiation. On 9/18, met with Dr. King, recommended a staged approach for esophagectomy. On 10/2, underwent a lap assisted J-tube insertion and EGD with peritoneal biopsy. No metastatic disease was identified in the peritoneum at this time. Today, he underwent the second stage esophagectomy (10/23/2023), which was uncomplicated. He presents to our surgical ICU for co-management post-operatively from the surgery and for hemodynamic monitoring.     REVIEW OF SYSTEMS: 10 point ROS neg other than the symptoms noted above in the HPI.    PAST MEDICAL HISTORY:   Past Medical History:   Diagnosis Date    Anemia     Esophageal cancer (H)     History of prostate cancer     History of thyroid cancer     HLD (hyperlipidemia)      Neuropathy     SNHL (sensorineural hearing loss)        SURGICAL HISTORY:   Past Surgical History:   Procedure Laterality Date    APPENDECTOMY      CRANIOTOMY      ESOPHAGOSCOPY, GASTROSCOPY, DUODENOSCOPY (EGD), COMBINED N/A 10/2/2023    Procedure: Esophagoscopy, gastroscopy, duodenoscopy (EGD);  Surgeon: Jennifer King MD;  Location: [facility] OR    LAPAROSCOPIC ASSISTED INSERTION TUBE JEJUNOSTOMY N/A 10/2/2023    Procedure: INSERTION, JEJUNOSTOMY TUBE, LAPAROSCOPY-ASSISTED;  Surgeon: Jennifer King MD;  Location: [facility] OR    LAPAROSCOPY DIAGNOSTIC (GENERAL) N/A 10/2/2023    Procedure: Diagnostic Laparoscopyand Lavage;  Surgeon: Jennifer King MD;  Location: [facility] OR    PROSTATECTOMY      THYROIDECTOMY, PARTIAL Left        SOCIAL HISTORY:   Social History     Socioeconomic History    Marital status:      Spouse name: None    Number of children: None    Years of education: None    Highest education level: None   Tobacco Use    Smoking status: Never     Passive exposure: Never    Smokeless tobacco: Never   Substance and Sexual Activity    Alcohol use: Not Currently     Comment: None since 6/23    Drug use: Never     FAMILY HISTORY: No bleeding/clotting disorders nor problems with anesthesia.     ALLERGIES:   Allergies   Allergen Reactions    Penicillins Rash     PN: LW Reaction: Reaction as a child        MEDICATIONS:  No current facility-administered medications on file prior to encounter.  acetaminophen (TYLENOL) 325 MG tablet, Take 2 tablets (650 mg) by mouth every 4 hours as needed for mild pain  docusate sodium (COLACE) 250 MG capsule, Take 250 mg by mouth 2 times daily  multivitamin w/minerals (THERA-VIT-M) tablet, Take 1 tablet by mouth every evening  oxyCODONE (ROXICODONE) 5 MG tablet, Take 1-2 tablets (5-10 mg) by mouth every 4 hours as needed for moderate to severe pain (Patient not taking: Reported on 10/17/2023)  simvastatin (ZOCOR) 80 MG tablet, Take 80 mg by mouth At Bedtime  triamcinolone (KENALOG) 0.1 %  external cream, Apply topically as needed (Patient not taking: Reported on 10/17/2023)        PHYSICAL EXAMINATION:  Temp:  [95.5  F (35.3  C)-99.7  F (37.6  C)] 97.7  F (36.5  C)  Pulse:  [65-85] 81  Resp:  [12-18] 14  BP: ()/(67-93) 109/74  MAP:  [71 mmHg-96 mmHg] 71 mmHg  Arterial Line BP: (103-126)/(54-78) 104/54  SpO2:  [92 %-100 %] 95 %  General: NAD, comfortable appearing  HEENT: NCAT, NG in place   Neuro: A&Ox3, NAD, moving all extremities spontaneously  Pulm/Resp: Breathing non-labored on 2L NC  CV: RRR, not requiring vasopressors  Abdomen: Soft, non-distended, non-tender, no rebound tenderness or guarding, incisions C/D/I with minimal strikethrough  :  armas catheter in place, urine yellow and clear  MSK/Extremities: 1+ peripheral edema, moving all extremities    LABS: Reviewed.   Arterial Blood Gases   No lab results found in last 7 days.  Complete Blood Count   Recent Labs   Lab 10/23/23  1846 10/17/23  1228   WBC 9.3 4.3   HGB 13.3 13.2*   * 165     Basic Metabolic Panel  Recent Labs   Lab 10/23/23  2046 10/23/23  1846 10/17/23  1228   NA  --  140 140   POTASSIUM  --  4.4 4.2   CHLORIDE  --  105 106   CO2  --  21* 24   BUN  --  20.1 20.7   CR  --  1.13 0.98   * 179* 83     Liver Function Tests  Recent Labs   Lab 10/17/23  1228   ALBUMIN 4.3     Pancreatic Enzymes  No lab results found in last 7 days.  Coagulation Profile  No lab results found in last 7 days.    IMAGING:  Recent Results (from the past 24 hour(s))   XR Chest Port 1 View    Narrative    EXAM: XR CHEST PORT 1 VIEW 10/23/2023 6:52 PM      HISTORY: s/p esophagectomy (Gabriel Reagan).    COMPARISON: Chest CT 10/17/2023.     TECHNIQUE: Frontal view of the chest.    FINDINGS: Numerous postoperative clips throughout the mediastinum.  Right chest wall port with tip projecting over the right atrium. Right  apical directed chest tube in place. Possible enteric tube, tip is not  well-visualized but appears to terminate in the lower  thoracic  esophagus. Trachea is midline. Hazy opacity of the left lung base. Low  lung volumes. No significant pleural effusion or pneumothorax.  Subcutaneous emphysema in the right chest wall and lower neck.      Impression    IMPRESSION:   1. Postoperative changes of the chest.  2. Left basilar mixed opacities, may represent consolidation or  atelectasis.  3. Enteric tube appears to terminate in the distal esophagus.     I have personally reviewed the examination and initial interpretation  and I agree with the findings.    EDVIN CASANOVA MD         SYSTEM ID:  [id number]

## 2023-10-23 NOTE — ANESTHESIA PROCEDURE NOTES
Arterial Line Procedure Note    Pre-Procedure   Staff -        Anesthesiologist:  Ryan Vang MD       Resident/Fellow: Tru Baugh MD       Performed By: resident       Location: OR       Pre-Anesthestic Checklist: patient identified, IV checked, risks and benefits discussed, informed consent, monitors and equipment checked, pre-op evaluation and at physician/surgeon's request  Timeout:       Correct Patient: Yes        Correct Procedure: Yes        Correct Site: Yes        Correct Position: Yes   Line Placement:   This line was placed Post Induction  Procedure   Procedure: arterial line       Diagnosis: monitoring       Laterality: left       Insertion Site: radial.  Sterile Prep        Standard elements of sterile barrier followed       Skin prep: Chloraprep  Insertion/Injection        Technique: ultrasound guided and Seldinger Technique        1. Ultrasound was used to evaluate the access site.       2. Artery evaluated via ultrasound for patency/adequacy.       3. Using real-time ultrasound the needle/catheter was observed entering the artery/vein.       5. The visualized structures were anatomically normal.       6. There were no apparent abnormal pathologic findings.       Catheter Type/Size: 20 G, 1.75 in/4.5 cm quick cath (integral wire)  Narrative         Secured by: anchor securement device       Tegaderm dressing used.       Complications: None apparent,        Arterial waveform: Yes        IBP within 10% of NIBP: Yes

## 2023-10-23 NOTE — BRIEF OP NOTE
[facility]    Brief Operative Note    Pre-operative diagnosis: Malignant neoplasm of esophagus, unspecified location (H) [C15.9]  Post-operative diagnosis Same    Procedure: MINIMALLY INVASIVE CECIL MARY ESOPHAGECTOMY, Esophagogastroduodenoscopy, Flexible Bronchoscopy, Right Video-Assisted Thoracic Surgery, CHEST TUBE PLACEMENT X2, N/A - Chest    Surgeon: Surgeon(s) and Role:     * Jennifer King MD - Primary     * Ramiro Lozano MD - Assisting     * Daniel Oropeza MD - Resident - Assisting     * Ina Burnette MD - Fellow - Assisting  Anesthesia: General   Estimated Blood Loss: 100 mls    Drains: 24 Fr pleural tube  Specimens:   ID Type Source Tests Collected by Time Destination   1 : Final Gastric Margin Tissue Other SURGICAL PATHOLOGY EXAM Jennifer King MD 10/23/2023  2:45 PM    2 : tomy-esophageal tissue Tissue Other SURGICAL PATHOLOGY EXAM Jennifer King MD 10/23/2023  3:16 PM    3 : esophagogastrectomy Tissue Other SURGICAL PATHOLOGY EXAM, RESEARCH SPECIMEN FOR BIONET TESTING (Canceled) Jennifer King MD 10/23/2023  3:22 PM      Findings:   Esophagogastrectomy - Negative esophageal resection margin on frozen section  Complications:   None  Implants: None

## 2023-10-24 ENCOUNTER — APPOINTMENT (OUTPATIENT)
Dept: OCCUPATIONAL THERAPY | Facility: CLINIC | Age: 73
DRG: 327 | End: 2023-10-24
Attending: STUDENT IN AN ORGANIZED HEALTH CARE EDUCATION/TRAINING PROGRAM
Payer: COMMERCIAL

## 2023-10-24 LAB
ANION GAP SERPL CALCULATED.3IONS-SCNC: 10 MMOL/L (ref 7–15)
ANION GAP SERPL CALCULATED.3IONS-SCNC: 11 MMOL/L (ref 7–15)
BUN SERPL-MCNC: 19.9 MG/DL (ref 8–23)
BUN SERPL-MCNC: 26.7 MG/DL (ref 8–23)
CALCIUM SERPL-MCNC: 8.3 MG/DL (ref 8.8–10.2)
CALCIUM SERPL-MCNC: 8.7 MG/DL (ref 8.8–10.2)
CHLORIDE SERPL-SCNC: 105 MMOL/L (ref 98–107)
CHLORIDE SERPL-SCNC: 106 MMOL/L (ref 98–107)
CREAT SERPL-MCNC: 1.09 MG/DL (ref 0.67–1.17)
CREAT SERPL-MCNC: 1.42 MG/DL (ref 0.67–1.17)
DEPRECATED HCO3 PLAS-SCNC: 22 MMOL/L (ref 22–29)
DEPRECATED HCO3 PLAS-SCNC: 23 MMOL/L (ref 22–29)
EGFRCR SERPLBLD CKD-EPI 2021: 52 ML/MIN/1.73M2
EGFRCR SERPLBLD CKD-EPI 2021: 71 ML/MIN/1.73M2
ERYTHROCYTE [DISTWIDTH] IN BLOOD BY AUTOMATED COUNT: 12.1 % (ref 10–15)
ERYTHROCYTE [DISTWIDTH] IN BLOOD BY AUTOMATED COUNT: 12.4 % (ref 10–15)
GLUCOSE SERPL-MCNC: 166 MG/DL (ref 70–99)
GLUCOSE SERPL-MCNC: 173 MG/DL (ref 70–99)
HCT VFR BLD AUTO: 37.7 % (ref 40–53)
HCT VFR BLD AUTO: 40.4 % (ref 40–53)
HGB BLD-MCNC: 13 G/DL (ref 13.3–17.7)
HGB BLD-MCNC: 13.7 G/DL (ref 13.3–17.7)
MAGNESIUM SERPL-MCNC: 2 MG/DL (ref 1.7–2.3)
MAGNESIUM SERPL-MCNC: 2.8 MG/DL (ref 1.7–2.3)
MCH RBC QN AUTO: 32.3 PG (ref 26.5–33)
MCH RBC QN AUTO: 32.4 PG (ref 26.5–33)
MCHC RBC AUTO-ENTMCNC: 33.9 G/DL (ref 31.5–36.5)
MCHC RBC AUTO-ENTMCNC: 34.5 G/DL (ref 31.5–36.5)
MCV RBC AUTO: 94 FL (ref 78–100)
MCV RBC AUTO: 96 FL (ref 78–100)
PHOSPHATE SERPL-MCNC: 3.8 MG/DL (ref 2.5–4.5)
PHOSPHATE SERPL-MCNC: 4.3 MG/DL (ref 2.5–4.5)
PLATELET # BLD AUTO: 134 10E3/UL (ref 150–450)
PLATELET # BLD AUTO: 135 10E3/UL (ref 150–450)
POTASSIUM SERPL-SCNC: 4.5 MMOL/L (ref 3.4–5.3)
POTASSIUM SERPL-SCNC: 4.9 MMOL/L (ref 3.4–5.3)
RBC # BLD AUTO: 4.02 10E6/UL (ref 4.4–5.9)
RBC # BLD AUTO: 4.23 10E6/UL (ref 4.4–5.9)
SODIUM SERPL-SCNC: 138 MMOL/L (ref 135–145)
SODIUM SERPL-SCNC: 139 MMOL/L (ref 135–145)
WBC # BLD AUTO: 11.1 10E3/UL (ref 4–11)
WBC # BLD AUTO: 8.4 10E3/UL (ref 4–11)

## 2023-10-24 PROCEDURE — 83735 ASSAY OF MAGNESIUM: CPT | Performed by: STUDENT IN AN ORGANIZED HEALTH CARE EDUCATION/TRAINING PROGRAM

## 2023-10-24 PROCEDURE — 97535 SELF CARE MNGMENT TRAINING: CPT | Mod: GO

## 2023-10-24 PROCEDURE — 97165 OT EVAL LOW COMPLEX 30 MIN: CPT | Mod: GO

## 2023-10-24 PROCEDURE — 85014 HEMATOCRIT: CPT

## 2023-10-24 PROCEDURE — 250N000013 HC RX MED GY IP 250 OP 250 PS 637

## 2023-10-24 PROCEDURE — 200N000002 HC R&B ICU UMMC

## 2023-10-24 PROCEDURE — 84100 ASSAY OF PHOSPHORUS: CPT

## 2023-10-24 PROCEDURE — 250N000013 HC RX MED GY IP 250 OP 250 PS 637: Performed by: STUDENT IN AN ORGANIZED HEALTH CARE EDUCATION/TRAINING PROGRAM

## 2023-10-24 PROCEDURE — 84520 ASSAY OF UREA NITROGEN: CPT

## 2023-10-24 PROCEDURE — 250N000011 HC RX IP 250 OP 636: Mod: JZ | Performed by: STUDENT IN AN ORGANIZED HEALTH CARE EDUCATION/TRAINING PROGRAM

## 2023-10-24 PROCEDURE — 85027 COMPLETE CBC AUTOMATED: CPT | Performed by: STUDENT IN AN ORGANIZED HEALTH CARE EDUCATION/TRAINING PROGRAM

## 2023-10-24 PROCEDURE — 80048 BASIC METABOLIC PNL TOTAL CA: CPT | Performed by: STUDENT IN AN ORGANIZED HEALTH CARE EDUCATION/TRAINING PROGRAM

## 2023-10-24 PROCEDURE — 84100 ASSAY OF PHOSPHORUS: CPT | Performed by: STUDENT IN AN ORGANIZED HEALTH CARE EDUCATION/TRAINING PROGRAM

## 2023-10-24 PROCEDURE — 258N000003 HC RX IP 258 OP 636: Performed by: STUDENT IN AN ORGANIZED HEALTH CARE EDUCATION/TRAINING PROGRAM

## 2023-10-24 PROCEDURE — 83735 ASSAY OF MAGNESIUM: CPT

## 2023-10-24 PROCEDURE — 250N000011 HC RX IP 250 OP 636: Mod: JZ

## 2023-10-24 PROCEDURE — 250N000009 HC RX 250

## 2023-10-24 RX ORDER — METOPROLOL TARTRATE 1 MG/ML
5 INJECTION, SOLUTION INTRAVENOUS EVERY 5 MIN PRN
Status: COMPLETED | OUTPATIENT
Start: 2023-10-24 | End: 2023-10-25

## 2023-10-24 RX ORDER — DEXTROSE MONOHYDRATE 100 MG/ML
INJECTION, SOLUTION INTRAVENOUS CONTINUOUS PRN
Status: DISCONTINUED | OUTPATIENT
Start: 2023-10-24 | End: 2023-11-01 | Stop reason: HOSPADM

## 2023-10-24 RX ORDER — GUAIFENESIN 600 MG/1
15 TABLET, EXTENDED RELEASE ORAL DAILY
Status: DISCONTINUED | OUTPATIENT
Start: 2023-10-24 | End: 2023-11-01 | Stop reason: HOSPADM

## 2023-10-24 RX ORDER — DEXMEDETOMIDINE HYDROCHLORIDE 4 UG/ML
.1-1.2 INJECTION, SOLUTION INTRAVENOUS CONTINUOUS
Status: DISCONTINUED | OUTPATIENT
Start: 2023-10-24 | End: 2023-10-27

## 2023-10-24 RX ADMIN — SENNOSIDES AND DOCUSATE SODIUM 2 TABLET: 8.6; 5 TABLET ORAL at 19:49

## 2023-10-24 RX ADMIN — OXYCODONE HYDROCHLORIDE 10 MG: 10 TABLET ORAL at 23:42

## 2023-10-24 RX ADMIN — LIDOCAINE PATCH 4% 1 PATCH: 40 PATCH TOPICAL at 07:56

## 2023-10-24 RX ADMIN — OXYCODONE HYDROCHLORIDE 5 MG: 5 TABLET ORAL at 19:49

## 2023-10-24 RX ADMIN — ACETAMINOPHEN 975 MG: 325 TABLET, FILM COATED ORAL at 14:39

## 2023-10-24 RX ADMIN — SENNOSIDES AND DOCUSATE SODIUM 1 TABLET: 8.6; 5 TABLET ORAL at 07:55

## 2023-10-24 RX ADMIN — ENOXAPARIN SODIUM 40 MG: 40 INJECTION SUBCUTANEOUS at 15:49

## 2023-10-24 RX ADMIN — METHOCARBAMOL 500 MG: 500 TABLET ORAL at 11:26

## 2023-10-24 RX ADMIN — OXYCODONE HYDROCHLORIDE 10 MG: 10 TABLET ORAL at 11:26

## 2023-10-24 RX ADMIN — OXYCODONE HYDROCHLORIDE 10 MG: 10 TABLET ORAL at 15:49

## 2023-10-24 RX ADMIN — Medication 15 ML: at 15:49

## 2023-10-24 RX ADMIN — POLYETHYLENE GLYCOL 3350 17 G: 17 POWDER, FOR SOLUTION ORAL at 07:54

## 2023-10-24 RX ADMIN — ALBUTEROL SULFATE 4 PUFF: 90 AEROSOL, METERED RESPIRATORY (INHALATION) at 09:07

## 2023-10-24 RX ADMIN — METHOCARBAMOL 500 MG: 500 TABLET ORAL at 19:49

## 2023-10-24 RX ADMIN — ACETAMINOPHEN 975 MG: 325 TABLET, FILM COATED ORAL at 21:37

## 2023-10-24 RX ADMIN — OXYCODONE HYDROCHLORIDE 10 MG: 10 TABLET ORAL at 06:56

## 2023-10-24 RX ADMIN — ALBUTEROL SULFATE 4 PUFF: 90 AEROSOL, METERED RESPIRATORY (INHALATION) at 19:53

## 2023-10-24 RX ADMIN — METOPROLOL TARTRATE 5 MG: 5 INJECTION INTRAVENOUS at 19:25

## 2023-10-24 RX ADMIN — ALBUTEROL SULFATE 4 PUFF: 90 AEROSOL, METERED RESPIRATORY (INHALATION) at 12:36

## 2023-10-24 RX ADMIN — GABAPENTIN 100 MG: 100 CAPSULE ORAL at 21:37

## 2023-10-24 RX ADMIN — CEFAZOLIN SODIUM 2 G: 2 INJECTION, SOLUTION INTRAVENOUS at 15:55

## 2023-10-24 RX ADMIN — ALBUTEROL SULFATE 4 PUFF: 90 AEROSOL, METERED RESPIRATORY (INHALATION) at 16:22

## 2023-10-24 RX ADMIN — ACETAMINOPHEN 975 MG: 325 TABLET, FILM COATED ORAL at 06:17

## 2023-10-24 RX ADMIN — CEFAZOLIN SODIUM 2 G: 2 INJECTION, SOLUTION INTRAVENOUS at 07:54

## 2023-10-24 ASSESSMENT — ACTIVITIES OF DAILY LIVING (ADL)
ADLS_ACUITY_SCORE: 28

## 2023-10-24 NOTE — PROGRESS NOTES
Admitted/transferred from: PACU  Reason for admission/transfer: Esophagectomy/monitoring  2 RN skin assessment: completed by Tammi August  Result of skin assessment and interventions/actions: Abd surgical incsions, blister left side, 2 chest tube sites  Height, weight, drug calc weight: Done  Patient belongings (see Flowsheet)  MDRO education added to care planYes  ?

## 2023-10-24 NOTE — PLAN OF CARE
Major Shift Events:  Admitted to ICU around 2040 following esophagectomy. Alert and oriented x4, BRIELLE, moves all extremities without difficulty. Dilaudid PCA pump in place. SR, rate in the 70's. MAP goal greater than 65, meets without intervention. NC removed around 0400. Lung sounds clear/diminished. Bowel sounds hypoactive, not passing gas at this time. NG in place to LIS, J-tube in place and used for medications. Rt and left chest tube. Rt chest tube decreasing outputs, 50-0 every 2 hours, left chest tube steady outputs 0-60 every 2 hours. Baca in place with adequate urine output.   Plan: Nutrition consult on 10/24/23. Continue with plan of care and notify team of any changes.  For vital signs and complete assessments, please see documentation flowsheets.        Plan of Care Reviewed With: patient    Overall Patient Progress: improvingOverall Patient Progress: improving

## 2023-10-24 NOTE — PHARMACY-ADMISSION MEDICATION HISTORY
Pharmacist Admission Medication History    Admission medication history is complete. The information provided in this note is only as accurate as the sources available at the time of the update.    Information Source(s): Patient and CareEverywhere/SureScripts via in-person    Pertinent Information:   - Patient was a reliable historian able to confirm all medication names, strengths, frequencies, and last doses.     Changes made to PTA medication list:  Added: None  Deleted:   Triamcinolone cream: patient reports not using this for at least 9 months  Sennosides: patient was taking this during chemo but has not taken in several months  Oxycodone: Short term prescription. Patient does not have any at home and has not taken any in atleast 2 weeks.   Changed: None    Allergies reviewed with patient and updates made in EHR: yes    Medication History Completed By: Rajan Angel [facility] 10/24/2023 10:41 AM    Prior to Admission medications    Medication Sig Last Dose Taking? Auth Provider Long Term End Date   acetaminophen (TYLENOL) 325 MG tablet Take 2 tablets (650 mg) by mouth every 4 hours as needed for mild pain 10/21/2023 Yes Daniel Oropeza MD     docusate sodium (COLACE) 250 MG capsule Take 250 mg by mouth 2 times daily 10/21/2023 at 2000 Yes Reported, Patient     multivitamin w/minerals (THERA-VIT-M) tablet Take 1 tablet by mouth every evening 10/16/2023 at 0800 Yes Reported, Patient     simvastatin (ZOCOR) 80 MG tablet Take 80 mg by mouth At Bedtime 10/21/2023 at 2000 Yes Reported, Patient Yes

## 2023-10-24 NOTE — PHARMACY-CONSULT NOTE
Pharmacy Tube Feeding Consult    Medication reviewed for administration by feeding tube and for potential food/drug interactions.    Recommendation: No changes are needed at this time.     Pharmacy will continue to follow as new medications are ordered.    Tammi Maldonado RPH on 10/24/2023 at 2:39 PM

## 2023-10-24 NOTE — PROGRESS NOTES
"  Thoracic Surgery Progress Note  Surgery Cross-Cover  Post Op Check    10/24/2023    Alonso Rogel is a 73 year old male POD#0 s/p Procedure(s):  MINIMALLY INVASIVE CECIL MARY ESOPHAGECTOMY, Esophagogastroduodenoscopy, Flexible Bronchoscopy, Right Video-Assisted Thoracic Surgery, CHEST TUBE PLACEMENT X2 for Pre-Op Diagnosis Codes:     * Malignant neoplasm of esophagus, unspecified location (H) [C15.9]    Pt reports their pain is controlled with current regimen. Denies nausea, SOB, chest pain, or dizziness. Patient is voiding via urinary catheter.    /74   Pulse 61   Temp 98  F (36.7  C) (Oral)   Resp 13   Ht 1.816 m (5' 11.5\")   Wt 108 kg (238 lb 1.6 oz)   SpO2 98%   BMI 32.74 kg/m      Gen: A&O x4, NAD   Chest: breathing non-labored on nasal cannula at 2 liters per minute   Abdomen: soft, non-tender, non-distended  Incision: clean, dry, intact. Dressings on  Extremities: warm and well perfused  Devices: right chest tube and left chest tube- water seal, thin serosanguinous discharge    A/P: No acute post-op issues. Continue plan of care per primary team.     Keerthi David MD   "

## 2023-10-24 NOTE — PROGRESS NOTES
CLINICAL NUTRITION SERVICES - ASSESSMENT NOTE     Nutrition Prescription    Malnutrition Status:    Patient does not meet two of the established criteria necessary for diagnosing malnutrition but is at risk for malnutrition    Recommendations already ordered by Registered Dietitian (RD):  EN support via J-tube - trophic feeds only per Thoracic team:  Osmolite 1.5 @ 10 mL/hr  - 30 mL q4hr fluid flushes for tube patency. Additional fluids and/or adjustments per MD.   - Multivitamin/mineral (15 mL/day via FT) to help ensure micronutrient needs being met with suspected hypermetabolic demands and potential interruptions to TF infusions.    Future/Additional Recommendations:  Monitor ability to advance to goal TF rate:  Osmolite 1.5 Gerber (or equivalent) @ goal of  60ml/hr  (1440ml/day) + 1 pkt ProSource TF20 BID (2 pkts total) provides: 2320 kcals (27 kcal/kg), 130 g PRO (1.5 g pro/kg), 1097 ml free H20, 293 g CHO, and 0 g fiber daily.   - Advance by 10 mL q8hr or as otherwise directed by Thoracic team as tolerated to goal rate.   - Do not start or advance unless K+ >/= 3, Mg++ >/=1.5, and phos >/=1.9       REASON FOR ASSESSMENT  Alonso Rogel is a/an 73 year old male assessed by the dietitian for Provider Order - Registered Dietitian to Assess and Order TF per Medical Nutrition Therapy Protocol    NUTRITION HISTORY  Pt not previously known to Clinical Nutrition.    No food allergies noted.    Met with pt and pt's wife at bedside. Pt reports not using J-tube except for flushing with water a couple times a day over the past few weeks. Pt shares his appetite has been good, but since developing difficulty swallowing/choking around April 2023, he's been more mindful of chewing food very well and eating smaller portions. For example, he no longer eats out at lunch time and instead will make himself a meat & cheese sandwich or yogurt with fruit. During chemoradiation he did have lower appetite and didn't eat as well as a  "result, but this has since resolved and he has had normal appetite over the past couple months. Compared to prior to swallowing issues, he estimates he's eating ~75% usual PO, partly r/t smaller portions and also d/t cutting out beers (now abstains from alcohol).     CURRENT NUTRITION ORDERS  Diet: NPO    LABS  Labs reviewed  Electrolytes  Potassium (mmol/L)   Date Value   10/24/2023 4.5   10/23/2023 4.4   10/17/2023 4.2     Phosphorus (mg/dL)   Date Value   10/24/2023 3.8    Blood Glucose  Glucose (mg/dL)   Date Value   10/24/2023 173 (H)   10/23/2023 179 (H)   10/17/2023 83     GLUCOSE BY METER POCT (mg/dL)   Date Value   10/23/2023 177 (H)    Inflammatory Markers  WBC Count (10e3/uL)   Date Value   10/24/2023 8.4   10/23/2023 9.3   10/17/2023 4.3     Albumin (g/dL)   Date Value   10/17/2023 4.3      Magnesium (mg/dL)   Date Value   10/24/2023 2.0   10/23/2023 2.1     Sodium (mmol/L)   Date Value   10/24/2023 138   10/23/2023 140   10/17/2023 140    Renal  Urea Nitrogen (mg/dL)   Date Value   10/24/2023 19.9   10/23/2023 20.1   10/17/2023 20.7     Creatinine (mg/dL)   Date Value   10/24/2023 1.09   10/23/2023 1.11   10/23/2023 1.13     Additional  No results found for: \"TRIG\", \"URINEKETONE\"     MEDICATIONS  Medications reviewed  -Miralax  -Senna-docusate  -D5W + 1/2 NS @ 30 mL/hr    ANTHROPOMETRICS  Height: 181.6 cm (5' 11.5\")  Most Recent Weight: 108 kg (238 lb 1.6 oz)    IBW: 80.9 kg   BMI: 32.74 kg/m2; Obesity Grade I BMI 30-34.9  Weight History: ~4% wt loss total over past ~4 months (does not meet criteria for malnutrition). Pt shares his UBW prior to swallowing issues last Spring was 250 lbs.   Wt Readings from Last 20 Encounters:   10/23/23 108 kg (238 lb 1.6 oz)   10/17/23 106.6 kg (235 lb)   10/02/23 106.1 kg (233 lb 14.5 oz)   09/28/23 107.4 kg (236 lb 11.2 oz)   09/18/23 107.1 kg (236 lb 3.2 oz)   06/22/23 110.5 kg (243 lb 9.6 oz)   Dosing Weight: 87 kg (adjusted, based on lowest wt this admit of 106 kg " on 10/23 and IBW of 80.9 kg)    ASSESSED NUTRITION NEEDS  Estimated Energy Needs: 6865-4202 kcals/day (25 - 30 kcals/kg)  Justification: Maintenance  Estimated Protein Needs: 104-131 grams protein/day (1.2 - 1.5 grams of pro/kg)  Justification: Increased needs and Post-op  Estimated Fluid Needs: 1 mL/kcal  Justification: Maintenance or Per provider pending fluid status    PHYSICAL FINDINGS  See malnutrition section below.  -S/p minimally invasive esophagectomy with bilateral chest tube placement on 10/23/23   -16 Fr J-tube placed 10/2  -Hair, skin, nails appear WNL, especially for age    MALNUTRITION  % Intake: Decreased intake does not meet criteria  % Weight Loss: Weight loss does not meet criteria  Subcutaneous Fat Loss: None observed  Muscle Loss: None observed - pt denies any appreciable muscle loss. On exam, he has above average muscle tone, especially for his age.  Fluid Accumulation/Edema: Does not meet criteria (1+ peripheral edema per SICU note from 10/23)  Malnutrition Diagnosis: Patient does not meet two of the established criteria necessary for diagnosing malnutrition but is at risk for malnutrition    NUTRITION DIAGNOSIS  Altered GI function related to s/p esophagectomy as evidenced by J-tube placement and need for EN support to meet pt's full nutrition needs at this time.      INTERVENTIONS  Implementation  -Nutrition Education (pt and pt's wife): Provided education on RD role, role of NFPE, discussed EN support regimen and basics about EN support. Answered questions.    -Collaboration with other providers - Thoracic Resident spoke to writer about starting trophic TF today and placed TF consult.   -Enteral Nutrition - Initiate trophic  -Feeding tube flush  -Multivitamin/mineral supplement therapy     Goals  Once TF at goal rate, total avg nutritional intake to meet a minimum of 25 kcal/kg and 1.2 g PRO/kg daily (per dosing wt 87 kg).     Monitoring/Evaluation  Progress toward goals will be monitored  and evaluated per protocol.  Dana Ann RD, LD  Pager: [phone]; Ascom: *[phone]

## 2023-10-24 NOTE — PROGRESS NOTES
SURGICAL ICU PROGRESS NOTE  10/24/2023      PRIMARY TEAM: Thoracic Surgery  PRIMARY PHYSICIAN: Fernando  REASON FOR CRITICAL CARE ADMISSION: Co-management of patient s/p esophagectomy   ADMITTING PHYSICIAN: Foreign  Date of Service (when I saw the patient): 10/24/2023    ASSESSMENT:  Alonso Rogel is a 73 year old male who was admitted on 10/23/2023 s/p stage 2 of laparoscopic esophagectomy with bilateral chest tube placement, with the first stage involving J-tube   placement on 10/2/2023. Admitted to SICU post-operatively for hemodynamic monitoring.    CHANGES AND UPDATES:  - doing well post-operatively  - pain well controlled with current pain regimen  - Maintaining MAPs without pressors and significant fluids  - Nutrition plan per primary team  - okay for floor      PLAN:  Neurological:  # Acute post-operative pain   - Monitor neurological status. Delirium preventions and precautions.   - Pain: Dilaudid PCA, oxycodone 5-10 q4h, robaxin 500 q6h prn, cynthia and prn tylenol, gabapentin, lidocaine patch   - Sedation plan: None    Pulmonary:   # Post-Operative respiratory support   - Supplemental oxygen to keep saturation above 92 %. Currently on 2L NC  - Incentive spirometer every 15- 30 minutes when awake.  - Albuterol QID  - Bilateral chest tubes to water seal    Cardiovascular:    #Shock - hypovolemic  #Hyperlipidemia  - Monitor hemodynamic status. MAP > 65.   - Maintaining MAP goals  - PTA simvastatin held overnight    Gastroenterology/Nutrition:  # Esophageal Adenocarcinoma s/p Esophagectomy  # Protein calorie deficit malnutrition   - Tube feeds: held overnight.  - Okay to start trophic feeds from ICU standpoint, deferring advancing tube feeds per primary team  - Meds via J tube  - NGT to LIS, flush q6h with 30 mL room temp water   - No indication for parenteral nutrition.  - Bowel regimen: Miralax, senna, dulcolax, milk of magnesia  - PRN zofran, compazine for nausea    Fluids/Electrolytes:   - D5 1/2 NS + K for IV  fluid hydration  - Daily BMP, Mg, Phos    Renal:  - Baseline Cr 0.98. Will continue to monitor  - Urine output is adequate . Will continue to monitor intake and output.    Endocrine:  # Stress hyperglycemia   - No management indication. Will continue to monitor for need for SSI    ID:  - Post-op ancef per primary team    Heme:     # Acute blood loss anemia   # Anemia of critical illness   - EBL 100cc  - Transfuse if hgb <7.0 or signs/symptoms of hypoperfusion. Monitor and trend.   - Daily CBC    Musculoskeletal:  # Weakness and deconditioning of critical illness   - Physical and occupational therapy consult     Skin:  - Continue assessments for wounds    General Cares/Prophylaxis:    DVT Prophylaxis: Enoxaparin (Lovenox) SQ  GI Prophylaxis: None  Restraints: Restraints for medical healing needed: NO    Lines/ tubes/ drains:  - R chest wall port  - 3x PIV  - L radial A-line  - Baca  - L and R chest tubes (WS)  - NGT  - J-tube      Disposition:  - Okay for floor status    Patient seen, findings and plan discussed with surgical ICU staff, Dr. Horta.    Johnnie Lang MD  Surgery Resident  - - - - - - - - - - - - - - - - - - - - - - - - - - - - - - - - - - - - - - - - - - - - - -   HISTORY PRESENTING ILLNESS:   Alonso Copeland is a 74 y/o M with a PMHx of HLD, anemia, neuropathy, sensorineural hearing loss thyroid cancer and prostate cancer hx. He was found to have esophageal cancer in May after presenting with symptoms of food impaction. He underwent chemoradiation. On 9/18, met with Dr. King, recommended a staged approach for esophagectomy. On 10/2, underwent a lap assisted J-tube insertion and EGD with peritoneal biopsy. No metastatic disease was identified in the peritoneum at this time. Today, he underwent the second stage esophagectomy (10/23/2023), which was uncomplicated. He presents to our surgical ICU for co-management post-operatively from the surgery and for hemodynamic monitoring.     REVIEW OF SYSTEMS: 10  point ROS neg other than the symptoms noted above in the HPI.    PAST MEDICAL HISTORY:   Past Medical History:   Diagnosis Date    Anemia     Esophageal cancer (H)     History of prostate cancer     History of thyroid cancer     HLD (hyperlipidemia)     Neuropathy     SNHL (sensorineural hearing loss)        SURGICAL HISTORY:   Past Surgical History:   Procedure Laterality Date    APPENDECTOMY      CRANIOTOMY      ESOPHAGOSCOPY, GASTROSCOPY, DUODENOSCOPY (EGD), COMBINED N/A 10/2/2023    Procedure: Esophagoscopy, gastroscopy, duodenoscopy (EGD);  Surgeon: Jennifer King MD;  Location: [facility] OR    LAPAROSCOPIC ASSISTED INSERTION TUBE JEJUNOSTOMY N/A 10/2/2023    Procedure: INSERTION, JEJUNOSTOMY TUBE, LAPAROSCOPY-ASSISTED;  Surgeon: Jennifer King MD;  Location: [facility] OR    LAPAROSCOPY DIAGNOSTIC (GENERAL) N/A 10/2/2023    Procedure: Diagnostic Laparoscopyand Lavage;  Surgeon: Jennifer King MD;  Location: [facility] OR    PROSTATECTOMY      THYROIDECTOMY, PARTIAL Left        SOCIAL HISTORY:   Social History     Socioeconomic History    Marital status:      Spouse name: None    Number of children: None    Years of education: None    Highest education level: None   Tobacco Use    Smoking status: Never     Passive exposure: Never    Smokeless tobacco: Never   Substance and Sexual Activity    Alcohol use: Not Currently     Comment: None since 6/23    Drug use: Never     FAMILY HISTORY: No bleeding/clotting disorders nor problems with anesthesia.     ALLERGIES:   Allergies   Allergen Reactions    Penicillins Rash     PN: LW Reaction: Reaction as a child        MEDICATIONS:  No current facility-administered medications on file prior to encounter.  acetaminophen (TYLENOL) 325 MG tablet, Take 2 tablets (650 mg) by mouth every 4 hours as needed for mild pain  docusate sodium (COLACE) 250 MG capsule, Take 250 mg by mouth 2 times daily  multivitamin w/minerals (THERA-VIT-M) tablet, Take 1 tablet by mouth every evening  oxyCODONE  (ROXICODONE) 5 MG tablet, Take 1-2 tablets (5-10 mg) by mouth every 4 hours as needed for moderate to severe pain (Patient not taking: Reported on 10/17/2023)  simvastatin (ZOCOR) 80 MG tablet, Take 80 mg by mouth At Bedtime  triamcinolone (KENALOG) 0.1 % external cream, Apply topically as needed (Patient not taking: Reported on 10/17/2023)        PHYSICAL EXAMINATION:  Temp:  [95.5  F (35.3  C)-99.7  F (37.6  C)] 98.3  F (36.8  C)  Pulse:  [60-87] 82  Resp:  [6-21] 15  BP: ()/(67-74) 109/74  MAP:  [64 mmHg-96 mmHg] 79 mmHg  Arterial Line BP: ()/(47-78) 136/56  SpO2:  [92 %-99 %] 97 %  General: NAD, comfortable appearing  HEENT: NCAT, NG in place   Neuro: A&Ox3, NAD, moving all extremities spontaneously  Pulm/Resp: Breathing non-labored on 2L NC  CV: RRR, not requiring vasopressors  Abdomen: Soft, non-distended, non-tender, no rebound tenderness or guarding, incisions C/D/I with minimal strikethrough  :  armas catheter in place, urine yellow and clear  MSK/Extremities: 1+ peripheral edema, moving all extremities    LABS: Reviewed.   Arterial Blood Gases   No lab results found in last 7 days.  Complete Blood Count   Recent Labs   Lab 10/24/23  0440 10/23/23  1846 10/17/23  1228   WBC 8.4 9.3 4.3   HGB 13.0* 13.3 13.2*   * 135* 165     Basic Metabolic Panel  Recent Labs   Lab 10/24/23  0440 10/23/23  2223 10/23/23  2046 10/23/23  1846 10/17/23  1228     --   --  140 140   POTASSIUM 4.5  --   --  4.4 4.2   CHLORIDE 105  --   --  105 106   CO2 22  --   --  21* 24   BUN 19.9  --   --  20.1 20.7   CR 1.09 1.11  --  1.13 0.98   *  --  177* 179* 83     Liver Function Tests  Recent Labs   Lab 10/17/23  1228   ALBUMIN 4.3     Pancreatic Enzymes  No lab results found in last 7 days.  Coagulation Profile  No lab results found in last 7 days.    IMAGING:  Recent Results (from the past 24 hour(s))   XR Chest Port 1 View    Narrative    EXAM: XR CHEST PORT 1 VIEW 10/23/2023 6:52 PM      HISTORY:  s/p esophagectomy ([place] [facility]).    COMPARISON: Chest CT 10/17/2023.     TECHNIQUE: Frontal view of the chest.    FINDINGS: Numerous postoperative clips throughout the mediastinum.  Right chest wall port with tip projecting over the right atrium. Right  apical directed chest tube in place. Possible enteric tube, tip is not  well-visualized but appears to terminate in the lower thoracic  esophagus. Trachea is midline. Hazy opacity of the left lung base. Low  lung volumes. No significant pleural effusion or pneumothorax.  Subcutaneous emphysema in the right chest wall and lower neck.      Impression    IMPRESSION:   1. Postoperative changes of the chest.  2. Left basilar mixed opacities, may represent consolidation or  atelectasis.  3. Enteric tube appears to terminate in the distal esophagus.     I have personally reviewed the examination and initial interpretation  and I agree with the findings.    DEVIN CASANOVA MD         SYSTEM ID:  [id number]

## 2023-10-24 NOTE — PLAN OF CARE
Neuro: Alert and oriented X 4 follows commands,   Cardiac: Sinus rhythm occasional PVC's, Bp 120's Map goal of > 65 maintained without intervention. At 1858 Pt went into AFIB, uncontrolled rates from 110 to 180 thoracic paged and came to bedside, awaiting orders.  Resp: On room air,   GI: NPO, NG tube to low intermittent suction, output brown, green, dark red, J tube feeding started at 10 ml/hr. No BM  : Baca cath discontinued and removed not voided yet  Skin: Multiple Lap sites to abdomen, Right and left chest tube incision sites, scattered bruising.  Muscle/Mobility: Out of bed to chair, two person assist for line management.  Pain: intermittent to constant pain, PCA pump with dilaudid at 0.2, routine tylenol and PRN oxycodone,   Lines/Drains: Cristina cath, R and L PIV     PLAN:  Transfer to  when bed available.

## 2023-10-24 NOTE — PLAN OF CARE
Goal Outcome Evaluation:      Plan of Care Reviewed With: patient, spouse    Overall Patient Progress: improvingOverall Patient Progress: improving    Outcome Evaluation: Trophic EN support started per Thoracic via J-tube. See Nutrition Progress note for further detail.

## 2023-10-24 NOTE — OP NOTE
Preoperative diagnosis: esophageal adenocarcinoma  Postoperative diagnosis: esopahgeal adenocarcinoma  Procedure:   Esophagogastroscopy  Laparoscopic gastric conduit preparation and abdominal lymphadenectomy  Botulinum toxin injection into pylorus  RIGHT thoracoscopic esophageal mobilization with mediastinal lymphadenectomy and intrathoracic anastomosis  LEFT tube thoracostomy     Anesthesia: General with double-lumen ETT  Surgeon: Jennifer King MD(present and participated in the entire procedure)  Assisting SUrgeon: Dr Ramiro Montana  Resident surgeon: Dr. Ina Burnette  EBL: 100  Specimens:  Esophagogastrectomy  Complications: none   Findings:  Laparoscopy: diagnostic portion was unremarkable.  RIGHT Thoracoscopy: diagnostic portion was unremarkable.  Intraoperative frozen section pathology: esophageal margin negative for malignancy .  Patient tolerance: good     Description of procedure  Laparoscopy   We prepared and draped the area. We used a 6-port approach and placed the first port using an open technique with fascial sutures for eventual closure.   Gastric conduit preparation   We opened the gastrohepatic ligament, transected the short gastric vessels, and freed up the greater curvature carefully identifying and preserving the right gastroepiploic artery to the level of its pedicle. We then verified that the pylorus could reach the hiatus without undue tension. We injected 200 IU of botulinum toxin into the pylorus. We then skeletonized the lesser curvature at a point just proximal to the incisura, preserving the  crow s foot  and created a 5 cm wide conduit using a stapler. The staple-line was over-sewn using a running Lembert suture.  Abdominal lymphadenectomy   We performed an extensive lymph node and soft-tissue dissection by including the gastrohepatic ligament, left gastric artery lymph nodes, splenic artery lymph nodes and proximal perigastric soft tissue in our specimen. We transected the left gastric  artery at its origin after careful skeletonization. We also included the edge of the crura in the en-bloc dissection.  At the end of the dissection and conduit preparation we stitched the tip of the conduit to the specimen side.  LEFT tube thoracostomy  We made a small incision in the left anterolateral chest wall and placed a small-bore chest tube and secured it.     RIGHT thoracoscopic esophageal mobilization with mediastinal lymphadenectomy  We turned the patient to the left lateral decubitus position and prepared and draped the area. We used a 4-port approach and transected the azygos vein with a stapler. We then mobilized the esophagus from the inlet to the hiatus using a combination of energy device and blunt dissection with great care close to the airway. We ligated the thoracic duct at the hiatus and underneath the azygous vein, and performed an en-bloc dissection of all periesophageal soft tissue including the pleura, thoracic duct, bilateral inferior pulmonary ligaments (we entered the left pleural space), and paraesophageal and subcarinal lymph nodes. We used multiple clips along the bed of the thoracic duct resection to minimize the chance of a postoperative chylothorax.   Next, we made a 7-8 cm working port out of one of our anterior port sites and placed a wound protector. We then pulled the esophagus and attached conduit into the chest and transected the stitch between the specimen and tip of the conduit. We pulled the tip of the conduit out through the working port and amputated it with a stapler to remove the potentially most ischemic portion of the conduit, but allowing sufficient length for a tension-free anastomosis. We then lined up the esophagus to the tip of the conduit with a stitch and transected the esophagus. The anastomosis was performed using a stapled posterior and hand sewn anterior technique and then tested for leak with endoscopy.  After comfirming an air-tight anastomosis we ensured  that the conduit was straight, properly oriented and without redundancy. We placed a stitch from the left beny to the serosa of the stomach to reduce the potential for herniation through the hiatus.  Once we completed the procedure, we placed a 24 Austrian chest tube and loosely sutured it to the chest wall with a vicryl stitch. We then re-insufflated the right lung and closed the incisions with absorbable sutures.     We placed an NG tube and secured it to the nose

## 2023-10-24 NOTE — PROGRESS NOTES
THORACIC & FOREGUT SURGERY    S:  No acute events overnight events. Pain well controlled with current regimen, maintaining MAPs without pressors or significant fluid requirements, not passing gas or having bowel movements, adequate urine output. Pt seen at bedside resting comfortably.       O:  Vitals:    10/24/23 0600 10/24/23 0700 10/24/23 0800 10/24/23 1100   BP:       BP Location:       Pulse: 71 82  79   Resp: 12 15  15   Temp:   98.3  F (36.8  C)    TempSrc:   Oral    SpO2: 93% 97%  93%   Weight:       Height:         Gen: A&Ox3, NAD, resting comfortably in bed  HEENT: NCAT, NG to LIS  Resp: Non-labored breathing on room air  CV: RRR, normotensive, hemodynamically stable  Abd: Soft, non-distended, non-tender, incisions clean dry and intact with minimal strikethrough  : Armas in place, urine yellow and clear  Ext: Distal extremities warm, 1+ peripheral edema, moving all extremities     A/P: Alonso Copeland is a 74 y/o M with a PMHx of HLD, anemia, neuropathy, sensorineural hearing loss thyroid cancer and prostate cancer hx now POD#1 s/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 King. Patient admitted to SICU post-operatively for hemodynamic monitoring. Doing well post-operatively, pain controlled on current regimen, hemodynamically stable, okay for transfer to floor today.     - Pain/Neuro: Dilaudid PCA 0.2 mg q10 min, Oxy 5 - 10 mg per J tube q4 PRN, Tylenol q8h Scheduled, Gabapentin 100 mg at bedtime scheduled, Robaxin 500 mg q6h PRN, Lidocaine patch q24h scheduled  - CV: Not on pressors in the ICU, continue to monitor hemodynamics  - Resp: Bilateral chest tubes to water seal, no air leak present, monitoring output, Albuterol inhaler QID  - GI: NG to LIS, plan to start trickle tube feeds today per nutrition, bowel regimen  -  Remove armas today  - ID: Perioperative antibiotics, no fevers or chills  - Heme: Daily CBCs, trend hemoglobin  - Renal/FEN:  D5 1/2 NS + KCl @ 30 mL/hr  Ppx: Lovenox 40 mg q24h, Protonix  Drains/lines/tubes with settings : Chest tube x2   -Lab may draw from the port  PT/OT/SW: PT OT  Dispo ICU, ok to transfer to     Colten May MD   PGY-1 Surgery

## 2023-10-24 NOTE — PROGRESS NOTES
10/24/23 1400   Appointment Info   Signing Clinician's Name / Credentials (OT) Lev Schaeffer, OTR/L   Living Environment   People in Home spouse   Current Living Arrangements house   Home Accessibility stairs within home;stairs to enter home   Number of Stairs, Main Entrance 2   Stair Railings, Main Entrance none   Number of Stairs, Within Home, Primary greater than 10 stairs   Stair Railings, Within Home, Primary railing on left side (ascending)   Transportation Anticipated car, drives self   Self-Care   Usual Activity Tolerance excellent   Current Activity Tolerance fair   Regular Exercise Other (see comments)  (Pt is very active with projects and home tasks)   Equipment Currently Used at Home none   Fall history within last six months no   Activity/Exercise/Self-Care Comment Pt has a walk in shower on main level.   General Information   Onset of Illness/Injury or Date of Surgery 10/23/23   Referring Physician Ina Burnette MD   Patient/Family Therapy Goal Statement (OT) Pt would like to regain function and return home.   Additional Occupational Profile Info/Pertinent History of Current Problem Alonso Copeland is a 74 y/o M with a PMHx of HLD, anemia, neuropathy, sensorineural hearing loss thyroid cancer and prostate cancer hx now POD#1 s/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 King. Patient admitted to SICU post-operatively for hemodynamic monitoring. Doing well post-operatively, pain controlled on current regimen, hemodynamically stable, okay for transfer to floor today.   Existing Precautions/Restrictions fall;abdominal   Left Upper Extremity (Weight-bearing Status)   (10lbs)   Right Upper Extremity (Weight-bearing Status)   (10lbs)   Left Lower Extremity (Weight-bearing Status) full weight-bearing (FWB)   Right Lower Extremity (Weight-bearing Status) full weight-bearing (FWB)   Cognitive Status Examination   Orientation Status orientation to person,  place and time   Visual Perception   Visual Impairment/Limitations WFL   Sensory   Sensory Quick Adds sensation intact   Pain Assessment   Patient Currently in Pain Yes, see Vital Sign flowsheet   Range of Motion Comprehensive   Comment, General Range of Motion BUE AROM, limited by pain but WFL.   Strength Comprehensive (MMT)   Comment, General Manual Muscle Testing (MMT) Assessment Pt with generalized post op weakness present.   Bed Mobility   Comment (Bed Mobility) Mod A and vc's   Transfers   Transfer Comments Min A and vc's   Activities of Daily Living   BADL Assessment/Intervention bathing;upper body dressing;lower body dressing;grooming;toileting   Bathing Assessment/Intervention   [place] Level (Bathing) set up;verbal cues;minimum assist (75% patient effort)   Comment, (Bathing) Per clinical judgement.   Upper Body Dressing Assessment/Training   Comment, (Upper Body Dressing) Per clinical judgement.   [place] Level (Upper Body Dressing) set up;verbal cues;minimum assist (75% patient effort)   Lower Body Dressing Assessment/Training   [place] Level (Lower Body Dressing) set up;verbal cues;moderate assist (50% patient effort)   Grooming Assessment/Training   [place] Level (Grooming) set up;verbal cues;minimum assist (75% patient effort)   Toileting   [place] Level (Toileting) set up;verbal cues;minimum assist (75% patient effort)   Clinical Impression   Criteria for Skilled Therapeutic Interventions Met (OT) Yes, treatment indicated   OT Diagnosis Decreased independence and tolerance for ADLS/transfers.   OT Problem List-Impairments impacting ADL problems related to;activity tolerance impaired;fear & anxiety;flexibility;mobility;range of motion (ROM);strength;pain;post-surgical precautions   Assessment of Occupational Performance 3-5 Performance Deficits   Identified Performance Deficits Decreased independence and tolerance with ADLS/ transfers.   Planned Therapy Interventions (OT) ADL  retraining;IADL retraining;bed mobility training;ROM;strengthening;stretching;transfer training;home program guidelines;progressive activity/exercise   Clinical Decision Making Complexity (OT) problem focused assessment/low complexity   Risk & Benefits of therapy have been explained evaluation/treatment results reviewed;care plan/treatment goals reviewed;patient;spouse/significant other   OT Total Evaluation Time   OT Eval, Low Complexity Minutes (67188) 10   OT Goals   Therapy Frequency (OT) Daily   OT Predicted Duration/Target Date for Goal Attainment 10/31/23   OT Discharge Planning   OT Plan OT: Functional transfers, Abdominal precautions review, Standing ADLs. Dressing.   OT Discharge Recommendation (DC Rec) Transitional Care Facility;home with assist   OT Rationale for DC Rec Pt is currently below baseline function. If pt were to discharge today, would recommend TCU, though pt will likely progress quickly to return home with A from family.   OT Brief overview of current status Mod A and vc's for bed mobility, Min A and vc's sit<->standing pivot transfer.   Total Session Time   Total Session Time (sum of timed and untimed services) 10

## 2023-10-25 ENCOUNTER — APPOINTMENT (OUTPATIENT)
Dept: GENERAL RADIOLOGY | Facility: CLINIC | Age: 73
DRG: 327 | End: 2023-10-25
Attending: STUDENT IN AN ORGANIZED HEALTH CARE EDUCATION/TRAINING PROGRAM
Payer: COMMERCIAL

## 2023-10-25 ENCOUNTER — APPOINTMENT (OUTPATIENT)
Dept: OCCUPATIONAL THERAPY | Facility: CLINIC | Age: 73
DRG: 327 | End: 2023-10-25
Attending: STUDENT IN AN ORGANIZED HEALTH CARE EDUCATION/TRAINING PROGRAM
Payer: COMMERCIAL

## 2023-10-25 ENCOUNTER — APPOINTMENT (OUTPATIENT)
Dept: PHYSICAL THERAPY | Facility: CLINIC | Age: 73
DRG: 327 | End: 2023-10-25
Attending: STUDENT IN AN ORGANIZED HEALTH CARE EDUCATION/TRAINING PROGRAM
Payer: COMMERCIAL

## 2023-10-25 LAB
ANION GAP SERPL CALCULATED.3IONS-SCNC: 10 MMOL/L (ref 7–15)
BUN SERPL-MCNC: 23.6 MG/DL (ref 8–23)
CALCIUM SERPL-MCNC: 8.7 MG/DL (ref 8.8–10.2)
CHLORIDE SERPL-SCNC: 105 MMOL/L (ref 98–107)
CREAT SERPL-MCNC: 1.21 MG/DL (ref 0.67–1.17)
DEPRECATED HCO3 PLAS-SCNC: 24 MMOL/L (ref 22–29)
EGFRCR SERPLBLD CKD-EPI 2021: 62 ML/MIN/1.73M2
ERYTHROCYTE [DISTWIDTH] IN BLOOD BY AUTOMATED COUNT: 12.7 % (ref 10–15)
GLUCOSE BLDC GLUCOMTR-MCNC: 138 MG/DL (ref 70–99)
GLUCOSE BLDC GLUCOMTR-MCNC: 139 MG/DL (ref 70–99)
GLUCOSE SERPL-MCNC: 140 MG/DL (ref 70–99)
HCT VFR BLD AUTO: 41.3 % (ref 40–53)
HGB BLD-MCNC: 13.7 G/DL (ref 13.3–17.7)
MAGNESIUM SERPL-MCNC: 2.4 MG/DL (ref 1.7–2.3)
MCH RBC QN AUTO: 31.9 PG (ref 26.5–33)
MCHC RBC AUTO-ENTMCNC: 33.2 G/DL (ref 31.5–36.5)
MCV RBC AUTO: 96 FL (ref 78–100)
PHOSPHATE SERPL-MCNC: 3.5 MG/DL (ref 2.5–4.5)
PLATELET # BLD AUTO: 153 10E3/UL (ref 150–450)
POTASSIUM SERPL-SCNC: 4.9 MMOL/L (ref 3.4–5.3)
RBC # BLD AUTO: 4.3 10E6/UL (ref 4.4–5.9)
SODIUM SERPL-SCNC: 139 MMOL/L (ref 135–145)
WBC # BLD AUTO: 11.3 10E3/UL (ref 4–11)

## 2023-10-25 PROCEDURE — 97116 GAIT TRAINING THERAPY: CPT | Mod: GP

## 2023-10-25 PROCEDURE — 97161 PT EVAL LOW COMPLEX 20 MIN: CPT | Mod: GP

## 2023-10-25 PROCEDURE — 250N000013 HC RX MED GY IP 250 OP 250 PS 637: Performed by: STUDENT IN AN ORGANIZED HEALTH CARE EDUCATION/TRAINING PROGRAM

## 2023-10-25 PROCEDURE — 258N000003 HC RX IP 258 OP 636: Performed by: STUDENT IN AN ORGANIZED HEALTH CARE EDUCATION/TRAINING PROGRAM

## 2023-10-25 PROCEDURE — 83735 ASSAY OF MAGNESIUM: CPT

## 2023-10-25 PROCEDURE — 97530 THERAPEUTIC ACTIVITIES: CPT | Mod: GO

## 2023-10-25 PROCEDURE — 250N000011 HC RX IP 250 OP 636: Performed by: STUDENT IN AN ORGANIZED HEALTH CARE EDUCATION/TRAINING PROGRAM

## 2023-10-25 PROCEDURE — 71045 X-RAY EXAM CHEST 1 VIEW: CPT | Mod: 26 | Performed by: RADIOLOGY

## 2023-10-25 PROCEDURE — 80048 BASIC METABOLIC PNL TOTAL CA: CPT | Performed by: STUDENT IN AN ORGANIZED HEALTH CARE EDUCATION/TRAINING PROGRAM

## 2023-10-25 PROCEDURE — 120N000003 HC R&B IMCU UMMC

## 2023-10-25 PROCEDURE — 71045 X-RAY EXAM CHEST 1 VIEW: CPT

## 2023-10-25 PROCEDURE — 84100 ASSAY OF PHOSPHORUS: CPT

## 2023-10-25 PROCEDURE — 250N000009 HC RX 250

## 2023-10-25 PROCEDURE — 99231 SBSQ HOSP IP/OBS SF/LOW 25: CPT | Performed by: SURGERY

## 2023-10-25 PROCEDURE — 71045 X-RAY EXAM CHEST 1 VIEW: CPT | Mod: 77

## 2023-10-25 PROCEDURE — 250N000013 HC RX MED GY IP 250 OP 250 PS 637

## 2023-10-25 PROCEDURE — 258N000003 HC RX IP 258 OP 636: Performed by: PHYSICIAN ASSISTANT

## 2023-10-25 PROCEDURE — 250N000011 HC RX IP 250 OP 636: Performed by: PHYSICIAN ASSISTANT

## 2023-10-25 PROCEDURE — 85027 COMPLETE CBC AUTOMATED: CPT | Performed by: STUDENT IN AN ORGANIZED HEALTH CARE EDUCATION/TRAINING PROGRAM

## 2023-10-25 PROCEDURE — 97530 THERAPEUTIC ACTIVITIES: CPT | Mod: GP

## 2023-10-25 RX ORDER — OXYCODONE HYDROCHLORIDE 5 MG/1
5 TABLET ORAL EVERY 6 HOURS
Status: DISCONTINUED | OUTPATIENT
Start: 2023-10-25 | End: 2023-10-25

## 2023-10-25 RX ORDER — OXYCODONE HYDROCHLORIDE 10 MG/1
10 TABLET ORAL EVERY 6 HOURS
Status: DISCONTINUED | OUTPATIENT
Start: 2023-10-25 | End: 2023-10-25

## 2023-10-25 RX ORDER — METHOCARBAMOL 500 MG/1
500 TABLET, FILM COATED ORAL EVERY 6 HOURS PRN
Status: DISCONTINUED | OUTPATIENT
Start: 2023-10-25 | End: 2023-10-27

## 2023-10-25 RX ORDER — OXYCODONE HYDROCHLORIDE 10 MG/1
10 TABLET ORAL EVERY 6 HOURS
Status: DISCONTINUED | OUTPATIENT
Start: 2023-10-25 | End: 2023-10-27

## 2023-10-25 RX ORDER — METHOCARBAMOL 750 MG/1
750 TABLET, FILM COATED ORAL EVERY 6 HOURS PRN
Status: DISCONTINUED | OUTPATIENT
Start: 2023-10-25 | End: 2023-10-25

## 2023-10-25 RX ADMIN — AMIODARONE HYDROCHLORIDE 1 MG/MIN: 50 INJECTION, SOLUTION INTRAVENOUS at 11:40

## 2023-10-25 RX ADMIN — METHOCARBAMOL 500 MG: 500 TABLET ORAL at 17:57

## 2023-10-25 RX ADMIN — Medication: at 17:12

## 2023-10-25 RX ADMIN — SENNOSIDES AND DOCUSATE SODIUM 2 TABLET: 8.6; 5 TABLET ORAL at 20:23

## 2023-10-25 RX ADMIN — METOPROLOL TARTRATE 5 MG: 5 INJECTION INTRAVENOUS at 07:02

## 2023-10-25 RX ADMIN — LIDOCAINE PATCH 4% 1 PATCH: 40 PATCH TOPICAL at 07:30

## 2023-10-25 RX ADMIN — OXYCODONE HYDROCHLORIDE 10 MG: 10 TABLET ORAL at 07:52

## 2023-10-25 RX ADMIN — ALBUTEROL SULFATE 4 PUFF: 90 AEROSOL, METERED RESPIRATORY (INHALATION) at 12:16

## 2023-10-25 RX ADMIN — AMIODARONE HYDROCHLORIDE 150 MG: 1.5 INJECTION, SOLUTION INTRAVENOUS at 09:42

## 2023-10-25 RX ADMIN — POTASSIUM CHLORIDE, DEXTROSE MONOHYDRATE AND SODIUM CHLORIDE: 150; 5; 450 INJECTION, SOLUTION INTRAVENOUS at 18:34

## 2023-10-25 RX ADMIN — ENOXAPARIN SODIUM 40 MG: 40 INJECTION SUBCUTANEOUS at 15:18

## 2023-10-25 RX ADMIN — Medication: at 22:43

## 2023-10-25 RX ADMIN — GABAPENTIN 100 MG: 100 CAPSULE ORAL at 21:33

## 2023-10-25 RX ADMIN — Medication 40 MG: at 07:39

## 2023-10-25 RX ADMIN — OXYCODONE HYDROCHLORIDE 10 MG: 10 TABLET ORAL at 12:16

## 2023-10-25 RX ADMIN — ACETAMINOPHEN 975 MG: 325 TABLET, FILM COATED ORAL at 21:33

## 2023-10-25 RX ADMIN — METOPROLOL TARTRATE 5 MG: 5 INJECTION INTRAVENOUS at 07:33

## 2023-10-25 RX ADMIN — ACETAMINOPHEN 975 MG: 325 TABLET, FILM COATED ORAL at 15:17

## 2023-10-25 RX ADMIN — OXYCODONE HYDROCHLORIDE 10 MG: 10 TABLET ORAL at 17:49

## 2023-10-25 RX ADMIN — ALBUTEROL SULFATE 4 PUFF: 90 AEROSOL, METERED RESPIRATORY (INHALATION) at 17:09

## 2023-10-25 RX ADMIN — ALBUTEROL SULFATE 4 PUFF: 90 AEROSOL, METERED RESPIRATORY (INHALATION) at 07:32

## 2023-10-25 RX ADMIN — METHOCARBAMOL 500 MG: 500 TABLET ORAL at 03:40

## 2023-10-25 RX ADMIN — SENNOSIDES AND DOCUSATE SODIUM 1 TABLET: 8.6; 5 TABLET ORAL at 07:31

## 2023-10-25 RX ADMIN — METHOCARBAMOL 500 MG: 500 TABLET ORAL at 11:05

## 2023-10-25 RX ADMIN — OXYCODONE HYDROCHLORIDE 10 MG: 10 TABLET ORAL at 03:40

## 2023-10-25 RX ADMIN — Medication 15 ML: at 11:05

## 2023-10-25 RX ADMIN — ALBUTEROL SULFATE 4 PUFF: 90 AEROSOL, METERED RESPIRATORY (INHALATION) at 20:33

## 2023-10-25 RX ADMIN — POLYETHYLENE GLYCOL 3350 17 G: 17 POWDER, FOR SOLUTION ORAL at 07:32

## 2023-10-25 RX ADMIN — ACETAMINOPHEN 975 MG: 325 TABLET, FILM COATED ORAL at 05:31

## 2023-10-25 ASSESSMENT — ACTIVITIES OF DAILY LIVING (ADL)
ADLS_ACUITY_SCORE: 28

## 2023-10-25 NOTE — PLAN OF CARE
Problem: Lung Surgery  Goal: Absence of Bleeding  Outcome: Progressing   Goal Outcome Evaluation:       D  awake alert - uses incentive spirometry to 500 x 5 q 60 min . Good cough-  up out of bed amb in hallway x 2 - sats 96% -on room air - o2 this am for sats 88%- removed at noon- - pain meds changed to scheduled to minimize breakthrough pain - PCA dilaudid . Chest tube L removed via md-  st cath for 800cc urine- MD aware.   A transfer orders in place- will transfer to [facility] when able - stable post op -   I will transfer to 6a

## 2023-10-25 NOTE — PROGRESS NOTES
THORACIC & FOREGUT SURGERY    S:  No acute overnight events. Patient went into Afib with RVR with rates 110s - 130s, given x1 dose of 5 mg, patient subsequently rate controlled throughout rest of night. Able to sleep for portion of night, subsequently got behind on pain medications secondary to not using PCA. Maintaining MAPs without pressors or fluid requirements, adequate urine output, straight cath-ed x1 overnight.     O:  Vitals:    10/25/23 0600 10/25/23 0700 10/25/23 0800 10/25/23 0900   BP: 124/78 139/74 130/75    BP Location:   Left arm    Cuff Size:       Pulse: 108 108 110 93   Resp: 14 17 17 13   Temp:   98  F (36.7  C)    TempSrc:   Oral    SpO2: 95% 96% (!) 88% 90%   Weight: 108.8 kg (239 lb 13.8 oz)      Height:         Gen: A&Ox3, NAD, resting comfortably in bed  HEENT: NCAT, NG to LIS  Resp: Non-labored breathing on 2-3L NC  CV: Atrial fibrillation, rate controlled (90 - 110s), hemodynamically stable  Abd: Soft, non-distended, non-tender, incisions clean dry and intact with minimal strikethrough  Ext: Distal extremities warm, 1+ peripheral edema, moving all extremities     A/P: Alonso Copeland is a 74 y/o M with a PMHx of HLD, anemia, neuropathy, sensorineural hearing loss thyroid cancer and prostate cancer hx now POD#2 s/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 King. Patient admitted to SICU post-operatively for hemodynamic monitoring. Doing well post-operatively, pain controlled on current regimen, hemodynamically stable, transfer order placed POD#1 to intermediate care.     - Pain/Neuro: Dilaudid PCA 0.2 mg q10 min, Scheduled Oxy 5 - 10 mg per J tube q6h, Tylenol q8h Scheduled, Gabapentin 100 mg at bedtime scheduled, Robaxin 500 mg q6h PRN, Lidocaine patch q24h scheduled  - CV: New onset Afib, given x3 5 mg of Metoprolol, Amio bolus and drip started  - Resp: Bilateral chest tubes to water seal, no air leak present, monitoring output,  Albuterol inhaler QID  - GI: NG to LIS, increase tube feeds to 20 mL/hr today, bowel regimen  - : Monitoring urine output  - ID: Perioperative antibiotics completed, continuing to monitor fever curve  - Heme: Daily CBCs, trend hemoglobin  - Renal/FEN: D5 1/2 NS + KCl @ 30 mL/hr  Ppx: Lovenox 40 mg q24h, Protonix  Drains/lines/tubes with settings : Chest tube x2 to water seal  - Lab may draw from the port  PT/OT/SW: PT OT  Dispo ICU, ok to transfer to  when bed becomes available    Colten May MD   PGY-1 Surgery

## 2023-10-25 NOTE — PLAN OF CARE
Major Shift Events:  Alert and oriented x4, BRIELLE, moves all extremities without difficulty. Dilaudid PCA pump in place. A symptomatic A-fib, rate between 90-120s after 1 dose of IV metoprolol. MAP goal greater than 65, meets without intervention. NC removed around 0400. Lung sounds clear/diminished. Bowel sounds hypoactive. NG in place to LIS, J-tube in place and used for medications and TF. TF @ 10 with standard flushes. No void this shift, bladder scan at 0400, 387. Page placed to thoracic for straight cath orders. Rt and left chest tube.    Plan: Transfer to  pending bed availability. Continue with plan of care and notify team of any changes.    For vital signs and complete assessments, please see documentation flowsheets.        Plan of Care Reviewed With: patient    Overall Patient Progress: improvingOverall Patient Progress: improving    Outcome Evaluation: Pain well mananged, tolerating tube feeds.

## 2023-10-25 NOTE — PROGRESS NOTES
"   10/25/23 7443   Appointment Info   Signing Clinician's Name / Credentials (PT) Lilibeth Arnett, PT, DPT   Living Environment   People in Home spouse   Current Living Arrangements house   Home Accessibility stairs to enter home   Number of Stairs, Main Entrance 2   Stair Railings, Main Entrance none  (holds onto \"stone ledge\" for front steps, in garage short wall on either side of steps)   Number of Stairs, Within Home, Primary greater than 10 stairs  (16 steps)   Stair Railings, Within Home, Primary railing on left side (ascending)   Living Environment Comments All needs met on main level of home, does usually go up to 2nd level sometimes (guest bedroom, storage, office space, exercise area).   Self-Care   Usual Activity Tolerance good   Activity/Exercise/Self-Care Comment Pt is independent with mobility without AD at baseline. Pt describes self as an \"active person\", mows lawn with a walk behind mower, does his own home maintainence, etc.   General Information   Onset of Illness/Injury or Date of Surgery 10/23/23   Referring Physician Ina Burnette MD   Patient/Family Therapy Goals Statement (PT) pt expresses that \"ideally\" he would not need to use AD for ambulation when he discharges home, also in agreement with goal to perform stairs prior to discharge   Pertinent History of Current Problem (include personal factors and/or comorbidities that impact the POC) Per chart, \"Alonso Copeland is a 72 y/o M with a PMHx of HLD, anemia, neuropathy, sensorineural hearing loss thyroid cancer and prostate cancer hx now POD#2 s/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 King. Patient admitted to SICU post-operatively for hemodynamic monitoring. Doing well post-operatively, pain controlled on current regimen, hemodynamically stable, transfer order placed POD#1 to intermediate care.\"   Existing Precautions/Restrictions fall  (s/p minimally invasive/laparoscpic " "abdominal/thoracic sx)   Cognition   Orientation Status (Cognition) oriented x 4   Pain Assessment   Patient Currently in Pain Yes, see Vital Sign flowsheet  (5/10 \"across my midsection\")   Integumentary/Edema   Integumentary/Edema Comments 1 chest tube noted (had 2 but 1 was removed earlier today); laparoscopic surgery sites bot visualized   Posture    Posture Forward head position;Protracted shoulders   Range of Motion (ROM)   ROM Comment LE ROM appears grossly WFL   Strength (Manual Muscle Testing)   Strength Comments Demos some functional weakness with transfers   Bed Mobility   Comment, (Bed Mobility) not observed, was Pierce with OT earlier today   Transfers   Comment, (Transfers) Sit>stand from recliner with Pierce, no AD   Gait/Stairs (Locomotion)   Comment, (Gait/Stairs) Pt ambulated away from chair with no UE support with CGA-SBA with slow pace, limited clearance, no overt LOB but slow & cautious, pt endorses feeling slightly unbalanced compared to baseline   Balance   Balance Comments Able to mobilize without UE support with close SBA, slightly WBOS, short steps, slow pace; pt endorses feeling slightly unbalanced compared to  baseline   Sensory Examination   Sensory Perception Comments reports baseline tingling R hand fingertips (>30 yr history)   Clinical Impression   Criteria for Skilled Therapeutic Intervention Yes, treatment indicated   PT Diagnosis (PT) decreased functional mobility and independence   Influenced by the following impairments pain, decreased activity tolerance, decreased strength, decreased balance   Functional limitations due to impairments difficulty/ decreased independence with bed mobility, transfers, ambulation, stairs   Clinical Presentation (PT Evaluation Complexity) stable   Clinical Presentation Rationale clinical judgement   Clinical Decision Making (Complexity) low complexity   Planned Therapy Interventions (PT) balance training;bed mobility training;cryotherapy;gait " training;home exercise program;patient/family education;stair training;strengthening;stretching;ROM (range of motion);neuromuscular re-education;transfer training;progressive activity/exercise;home program guidelines   Risk & Benefits of therapy have been explained evaluation/treatment results reviewed;care plan/treatment goals reviewed;current/potential barriers reviewed;participants voiced agreement with care plan;participants included;patient;spouse/significant other   Clinical Impression Comments Patient is mobilizing fairly well post op minimally invasive esophagectomy & bilateral chest tube placement. He is below baseline & will benefit from continued skilled therapies while hospitalized to progress independence & safety with mobility & progress overall endurance.   PT Total Evaluation Time   PT Eval, Low Complexity Minutes (24002) 6   Plan of Care Review   Plan of Care Reviewed With patient;spouse   Physical Therapy Goals   PT Frequency 6x/week   PT Predicted Duration/Target Date for Goal Attainment 11/08/23   PT Goals Bed Mobility;Transfers;Gait;Stairs   PT: Bed Mobility Independent   PT: Transfers Independent;Sit to/from stand   PT: Gait Independent;Greater than 200 feet;Modified independent;Assistive device  (No AD or LRAD if needed)   PT: Stairs 2 stairs;Supervision/stand-by assist  (per home set up)   PT Discharge Planning   PT Plan bed mobility practice, sit<>stand progress ind, ambulation- progress IND & distance, introduce stairs   PT Discharge Recommendation (DC Rec) home with assist   PT Rationale for DC Rec Pt mobilizing fairly well, anticipate he will progress in order for discharge home, may need SBA-HHA for stairs & will likely need assist for more taxing household tasks. Will continue to assess and update. Pt reports preference for no OP PT follow up, agreeable to plan for HEP & ambulation program.   PT Brief overview of current status Pierce sit>stand, SBA for ambulation without UE support    Total Session Time   Total Session Time (sum of timed and untimed services) 6

## 2023-10-25 NOTE — PROGRESS NOTES
ICU Staff:    I examined the patient today. I also discussed the patient's ICU history and care with the ICU team members today, Wednesday, Oct. 25th, 2023. I reviewed the patient's trauma history on Wednesday, Oct. 25th, progress notes, and imaging studies. I discussed the patient's events overnight and the current ICU care issues with the members of the ICU Service today, Wednesday, Oct. 25th, 2023. I discussed the patient's physical exam findings, laboratory values, and results of any additional imaging studies with the members of the ICU Service today.  The patient is POD#2 after stage 2 of minimally invasive esophagectomy with bilateral chest tube placement for esopahgeal adenocarcinoma.  His prior medical conditions include HLD, chronic anemia, neuropathy, sensorineural hearing loss, thyroid cancer and prostate cancer.The patient has postoperative atrial fib with RVR with irregular HRs between 110/minute- 130/minute.  The patient has required amiodarone bolus IV and an IV drip of amiodarone for symptomatic A-fib with RVR.  The HR improved to between 90/minute - 120/minute after administration of IV metoprolol. The patient also demonstrates PATRICIO postoperatively.      PAST MEDICAL HISTORY:  Past Medical History:   Diagnosis Date    Anemia     Esophageal cancer (H)     History of prostate cancer     History of thyroid cancer     HLD (hyperlipidemia)     Neuropathy     SNHL (sensorineural hearing loss)         PAST SURGICAL HISTORY:  Past Surgical History:   Procedure Laterality Date    APPENDECTOMY      CRANIOTOMY      ESOPHAGECTOMY MINIMALLY INVASIVE N/A 10/23/2023    Procedure: MINIMALLY INVASIVE CECIL MARY ESOPHAGECTOMY, Esophagogastroduodenoscopy, Flexible Bronchoscopy, Right Video-Assisted Thoracic Surgery, CHEST TUBE PLACEMENT X2;  Surgeon: Jennifer King MD;  Location: [facility] OR    ESOPHAGOSCOPY, GASTROSCOPY, DUODENOSCOPY (EGD), COMBINED N/A 10/2/2023    Procedure: Esophagoscopy, gastroscopy, duodenoscopy  (EGD);  Surgeon: Jennifer King MD;  Location: [facility] OR    LAPAROSCOPIC ASSISTED INSERTION TUBE JEJUNOSTOMY N/A 10/2/2023    Procedure: INSERTION, JEJUNOSTOMY TUBE, LAPAROSCOPY-ASSISTED;  Surgeon: Jennifer King MD;  Location: [facility] OR    LAPAROSCOPY DIAGNOSTIC (GENERAL) N/A 10/2/2023    Procedure: Diagnostic Laparoscopyand Lavage;  Surgeon: Jennifer King MD;  Location: [facility] OR    PROSTATECTOMY      THYROIDECTOMY, PARTIAL Left         MEDICATIONS:  Current Facility-Administered Medications   Medication    [START ON 10/26/2023] acetaminophen (TYLENOL) tablet 650 mg    acetaminophen (TYLENOL) tablet 975 mg    albuterol (PROVENTIL HFA/VENTOLIN HFA) inhaler    amiodarone (NEXTERONE) 1.8 mg/mL in sodium chloride 0.9% in non-PVC container 500 mL ADULT STANDARD infusion    bisacodyl (DULCOLAX) suppository 10 mg    dexmedeTOMIDine (PRECEDEX) 4 mcg/mL in NS infusion    dextrose 10% infusion    dextrose 5% and 0.45% NaCl + KCl 20 mEq/L infusion    glucose gel 15-30 g    Or    dextrose 50 % injection 25-50 mL    Or    glucagon injection 1 mg    enoxaparin ANTICOAGULANT (LOVENOX) injection 40 mg    gabapentin (NEURONTIN) capsule 100 mg    HYDROmorphone (DILAUDID) PCA 0.2 mg/mL OPIOID NAIVE    [START ON 10/28/2023] influenza vac high-dose quad (FLUZONE HD) injection DEANDRA 0.7 mL    Lidocaine (LIDOCARE) 4 % Patch 1 patch    lidocaine (LMX4) cream    lidocaine 1 % 0.1-1 mL    magnesium hydroxide (MILK OF MAGNESIA) suspension 30 mL    methocarbamol (ROBAXIN) tablet 500 mg    multivitamins w/minerals liquid 15 mL    naloxone (NARCAN) injection 0.2 mg    Or    naloxone (NARCAN) injection 0.4 mg    Or    naloxone (NARCAN) injection 0.2 mg    Or    naloxone (NARCAN) injection 0.4 mg    ondansetron (ZOFRAN ODT) ODT tab 4 mg    Or    ondansetron (ZOFRAN) injection 4 mg    oxyCODONE IR (ROXICODONE) tablet 10 mg    pantoprazole (PROTONIX) 2 mg/mL suspension 40 mg    polyethylene glycol (MIRALAX) Packet 17 g    prochlorperazine (COMPAZINE)  "injection 5 mg    Or    prochlorperazine (COMPAZINE) tablet 5 mg    senna-docusate (SENOKOT-S/PERICOLACE) 8.6-50 MG per tablet 1 tablet    Or    senna-docusate (SENOKOT-S/PERICOLACE) 8.6-50 MG per tablet 2 tablet    sodium chloride (PF) 0.9% PF flush 3 mL    sodium chloride (PF) 0.9% PF flush 3 mL        ALLERGIES:  Allergies   Allergen Reactions    Penicillins Rash     PN: LW Reaction: Reaction as a child         SOCIAL HISTORY:  Social History     Socioeconomic History    Marital status:      Spouse name: None    Number of children: None    Years of education: None    Highest education level: None   Tobacco Use    Smoking status: Never     Passive exposure: Never    Smokeless tobacco: Never   Substance and Sexual Activity    Alcohol use: Not Currently     Comment: None since 6/23    Drug use: Never       FAMILY HISTORY:  Family History   Problem Relation Age of Onset    Other - See Comments Mother         \"difficulty with opioids\" - severe nausea    Breast Cancer Mother     Myocardial Infarction Father 60    Kidney failure Father 66    Prostate Cancer Father 66    Breast Cancer Sister     Anesthesia Reaction No family hx of     Venous thrombosis No family hx of      Vital Signs: /85   Pulse 71   Temp 98  F (36.7  C) (Oral)   Resp 10   Ht 1.816 m (5' 11.5\")   Wt 108.8 kg (239 lb 13.8 oz)   SpO2 97%   BMI 32.99 kg/m        A/P: I discussed the assessment and the ICU care plan for this patient with the members of the ICU service today, Wednesday, Oct. 25th, 2023. I agree with the clinical assessment and the ICU care plan that I formulated with the members of the ICU Service today. I personally examined and evaluated the patient today in the [facility] ICU. The patient is POD#2 after stage 2 of minimally invasive esophagectomy with bilateral chest tube placement for esopahgeal adenocarcinoma.  His prior medical conditions include HLD, chronic anemia, neuropathy, sensorineural hearing loss, " thyroid cancer and prostate cancer.The patient has postoperative atrial fib with RVR with irregular HRs between 110/minute- 130/minute.  The patient has required amiodarone bolus IV and an IV drip of amiodarone for symptomatic A-fib with RVR.  The HR improved to between 90/minute - 120/minute after administration of IV metoprolol. The patient also demonstrates PATRICIO postoperatively.        Dex Corona Jr, MD, Ph.D., FACS   Surgery  Department of Surgery  [facility]

## 2023-10-26 ENCOUNTER — APPOINTMENT (OUTPATIENT)
Dept: OCCUPATIONAL THERAPY | Facility: CLINIC | Age: 73
DRG: 327 | End: 2023-10-26
Attending: STUDENT IN AN ORGANIZED HEALTH CARE EDUCATION/TRAINING PROGRAM
Payer: COMMERCIAL

## 2023-10-26 ENCOUNTER — APPOINTMENT (OUTPATIENT)
Dept: PHYSICAL THERAPY | Facility: CLINIC | Age: 73
DRG: 327 | End: 2023-10-26
Attending: STUDENT IN AN ORGANIZED HEALTH CARE EDUCATION/TRAINING PROGRAM
Payer: COMMERCIAL

## 2023-10-26 ENCOUNTER — APPOINTMENT (OUTPATIENT)
Dept: GENERAL RADIOLOGY | Facility: CLINIC | Age: 73
DRG: 327 | End: 2023-10-26
Attending: STUDENT IN AN ORGANIZED HEALTH CARE EDUCATION/TRAINING PROGRAM
Payer: COMMERCIAL

## 2023-10-26 ENCOUNTER — DOCUMENTATION ONLY (OUTPATIENT)
Dept: OTHER | Facility: CLINIC | Age: 73
End: 2023-10-26

## 2023-10-26 LAB
ANION GAP SERPL CALCULATED.3IONS-SCNC: 7 MMOL/L (ref 7–15)
BASE EXCESS BLDA CALC-SCNC: -1.8 MMOL/L (ref -9.6–2)
BASE EXCESS BLDA CALC-SCNC: -3.5 MMOL/L (ref -9.6–2)
BASE EXCESS BLDA CALC-SCNC: -3.8 MMOL/L (ref -9.6–2)
BASE EXCESS BLDA CALC-SCNC: -3.9 MMOL/L (ref -9.6–2)
BASE EXCESS BLDA CALC-SCNC: -4.2 MMOL/L (ref -9.6–2)
BUN SERPL-MCNC: 21.8 MG/DL (ref 8–23)
CA-I BLD-MCNC: 4.5 MG/DL (ref 4.4–5.2)
CA-I BLD-MCNC: 4.8 MG/DL (ref 4.4–5.2)
CALCIUM SERPL-MCNC: 8.7 MG/DL (ref 8.8–10.2)
CHLORIDE SERPL-SCNC: 106 MMOL/L (ref 98–107)
CREAT SERPL-MCNC: 1.1 MG/DL (ref 0.67–1.17)
DEPRECATED HCO3 PLAS-SCNC: 27 MMOL/L (ref 22–29)
EGFRCR SERPLBLD CKD-EPI 2021: 70 ML/MIN/1.73M2
ERYTHROCYTE [DISTWIDTH] IN BLOOD BY AUTOMATED COUNT: 12.6 % (ref 10–15)
GLUCOSE BLD-MCNC: 176 MG/DL (ref 70–99)
GLUCOSE BLD-MCNC: 178 MG/DL (ref 70–99)
GLUCOSE BLD-MCNC: 179 MG/DL (ref 70–99)
GLUCOSE BLD-MCNC: 182 MG/DL (ref 70–99)
GLUCOSE BLD-MCNC: 200 MG/DL (ref 70–99)
GLUCOSE SERPL-MCNC: 129 MG/DL (ref 70–99)
HCO3 BLDA-SCNC: 21 MMOL/L (ref 21–28)
HCO3 BLDA-SCNC: 23 MMOL/L (ref 21–28)
HCO3 BLDA-SCNC: 24 MMOL/L (ref 21–28)
HCT VFR BLD AUTO: 35.1 % (ref 40–53)
HGB BLD-MCNC: 11.6 G/DL (ref 13.3–17.7)
HGB BLD-MCNC: 13.7 G/DL (ref 13.3–17.7)
HGB BLD-MCNC: 13.7 G/DL (ref 13.3–17.7)
HGB BLD-MCNC: 14 G/DL (ref 13.3–17.7)
HGB BLD-MCNC: 14 G/DL (ref 13.3–17.7)
HGB BLD-MCNC: 14.4 G/DL (ref 13.3–17.7)
LACTATE BLD-SCNC: 0.7 MMOL/L
LACTATE BLD-SCNC: 0.9 MMOL/L
LACTATE BLD-SCNC: 1 MMOL/L
LACTATE BLD-SCNC: 1.2 MMOL/L
LACTATE BLD-SCNC: 1.4 MMOL/L
MAGNESIUM SERPL-MCNC: 2.1 MG/DL (ref 1.7–2.3)
MCH RBC QN AUTO: 32.2 PG (ref 26.5–33)
MCHC RBC AUTO-ENTMCNC: 33 G/DL (ref 31.5–36.5)
MCV RBC AUTO: 98 FL (ref 78–100)
O2/TOTAL GAS SETTING VFR VENT: 34 %
O2/TOTAL GAS SETTING VFR VENT: 35 %
O2/TOTAL GAS SETTING VFR VENT: 62 %
O2/TOTAL GAS SETTING VFR VENT: 62 %
O2/TOTAL GAS SETTING VFR VENT: 70 %
PCO2 BLDA: 30 MM HG (ref 35–45)
PCO2 BLDA: 47 MM HG (ref 35–45)
PCO2 BLDA: 47 MM HG (ref 35–45)
PCO2 BLDA: 49 MM HG (ref 35–45)
PCO2 BLDA: 51 MM HG (ref 35–45)
PH BLDA: 7.27 [PH] (ref 7.35–7.45)
PH BLDA: 7.28 [PH] (ref 7.35–7.45)
PH BLDA: 7.3 [PH] (ref 7.35–7.45)
PH BLDA: 7.3 [PH] (ref 7.35–7.45)
PH BLDA: 7.45 [PH] (ref 7.35–7.45)
PHOSPHATE SERPL-MCNC: 2.1 MG/DL (ref 2.5–4.5)
PLATELET # BLD AUTO: 123 10E3/UL (ref 150–450)
PO2 BLDA: 111 MM HG (ref 80–105)
PO2 BLDA: 115 MM HG (ref 80–105)
PO2 BLDA: 73 MM HG (ref 80–105)
PO2 BLDA: 82 MM HG (ref 80–105)
PO2 BLDA: 84 MM HG (ref 80–105)
POTASSIUM BLD-SCNC: 4.3 MMOL/L (ref 3.5–5)
POTASSIUM BLD-SCNC: 4.4 MMOL/L (ref 3.5–5)
POTASSIUM BLD-SCNC: 4.5 MMOL/L (ref 3.5–5)
POTASSIUM BLD-SCNC: 4.7 MMOL/L (ref 3.5–5)
POTASSIUM BLD-SCNC: 4.8 MMOL/L (ref 3.5–5)
POTASSIUM SERPL-SCNC: 4.8 MMOL/L (ref 3.4–5.3)
RBC # BLD AUTO: 3.6 10E6/UL (ref 4.4–5.9)
SODIUM BLD-SCNC: 139 MMOL/L (ref 135–145)
SODIUM BLD-SCNC: 140 MMOL/L (ref 135–145)
SODIUM BLD-SCNC: 141 MMOL/L (ref 135–145)
SODIUM SERPL-SCNC: 140 MMOL/L (ref 135–145)
WBC # BLD AUTO: 8.7 10E3/UL (ref 4–11)

## 2023-10-26 PROCEDURE — 71045 X-RAY EXAM CHEST 1 VIEW: CPT

## 2023-10-26 PROCEDURE — 97530 THERAPEUTIC ACTIVITIES: CPT | Mod: GP

## 2023-10-26 PROCEDURE — 250N000013 HC RX MED GY IP 250 OP 250 PS 637

## 2023-10-26 PROCEDURE — 120N000003 HC R&B IMCU UMMC

## 2023-10-26 PROCEDURE — 250N000009 HC RX 250

## 2023-10-26 PROCEDURE — 97116 GAIT TRAINING THERAPY: CPT | Mod: GP

## 2023-10-26 PROCEDURE — 80048 BASIC METABOLIC PNL TOTAL CA: CPT | Performed by: STUDENT IN AN ORGANIZED HEALTH CARE EDUCATION/TRAINING PROGRAM

## 2023-10-26 PROCEDURE — 250N000013 HC RX MED GY IP 250 OP 250 PS 637: Performed by: STUDENT IN AN ORGANIZED HEALTH CARE EDUCATION/TRAINING PROGRAM

## 2023-10-26 PROCEDURE — 97530 THERAPEUTIC ACTIVITIES: CPT | Mod: GO

## 2023-10-26 PROCEDURE — 85027 COMPLETE CBC AUTOMATED: CPT | Performed by: STUDENT IN AN ORGANIZED HEALTH CARE EDUCATION/TRAINING PROGRAM

## 2023-10-26 PROCEDURE — 71045 X-RAY EXAM CHEST 1 VIEW: CPT | Mod: 26 | Performed by: STUDENT IN AN ORGANIZED HEALTH CARE EDUCATION/TRAINING PROGRAM

## 2023-10-26 PROCEDURE — 258N000003 HC RX IP 258 OP 636: Performed by: STUDENT IN AN ORGANIZED HEALTH CARE EDUCATION/TRAINING PROGRAM

## 2023-10-26 PROCEDURE — 84100 ASSAY OF PHOSPHORUS: CPT

## 2023-10-26 PROCEDURE — 250N000011 HC RX IP 250 OP 636: Mod: JZ | Performed by: STUDENT IN AN ORGANIZED HEALTH CARE EDUCATION/TRAINING PROGRAM

## 2023-10-26 PROCEDURE — 83735 ASSAY OF MAGNESIUM: CPT

## 2023-10-26 PROCEDURE — 250N000009 HC RX 250: Performed by: STUDENT IN AN ORGANIZED HEALTH CARE EDUCATION/TRAINING PROGRAM

## 2023-10-26 PROCEDURE — 36415 COLL VENOUS BLD VENIPUNCTURE: CPT | Performed by: STUDENT IN AN ORGANIZED HEALTH CARE EDUCATION/TRAINING PROGRAM

## 2023-10-26 RX ORDER — AMIODARONE HYDROCHLORIDE 200 MG/1
400 TABLET ORAL 2 TIMES DAILY
Status: DISCONTINUED | OUTPATIENT
Start: 2023-10-26 | End: 2023-10-27

## 2023-10-26 RX ORDER — AMIODARONE HYDROCHLORIDE 200 MG/1
200 TABLET ORAL DAILY
Status: DISCONTINUED | OUTPATIENT
Start: 2023-11-09 | End: 2023-10-27

## 2023-10-26 RX ORDER — AMIODARONE HYDROCHLORIDE 200 MG/1
400 TABLET ORAL DAILY
Status: DISCONTINUED | OUTPATIENT
Start: 2023-11-02 | End: 2023-10-27

## 2023-10-26 RX ORDER — KETOROLAC TROMETHAMINE 15 MG/ML
15 INJECTION, SOLUTION INTRAMUSCULAR; INTRAVENOUS EVERY 6 HOURS PRN
Status: DISCONTINUED | OUTPATIENT
Start: 2023-10-26 | End: 2023-10-26

## 2023-10-26 RX ORDER — AMIODARONE HYDROCHLORIDE 200 MG/1
400 TABLET ORAL 2 TIMES DAILY
Status: DISCONTINUED | OUTPATIENT
Start: 2023-10-26 | End: 2023-10-26

## 2023-10-26 RX ORDER — FUROSEMIDE 10 MG/ML
20 INJECTION INTRAMUSCULAR; INTRAVENOUS ONCE
Status: COMPLETED | OUTPATIENT
Start: 2023-10-26 | End: 2023-10-26

## 2023-10-26 RX ADMIN — POLYETHYLENE GLYCOL 3350 17 G: 17 POWDER, FOR SOLUTION ORAL at 08:07

## 2023-10-26 RX ADMIN — SENNOSIDES AND DOCUSATE SODIUM 2 TABLET: 8.6; 5 TABLET ORAL at 08:08

## 2023-10-26 RX ADMIN — PHENOL 1 ML: 1.5 LIQUID ORAL at 13:27

## 2023-10-26 RX ADMIN — SENNOSIDES AND DOCUSATE SODIUM 1 TABLET: 8.6; 5 TABLET ORAL at 20:15

## 2023-10-26 RX ADMIN — PHENOL 1 ML: 1.5 LIQUID ORAL at 10:29

## 2023-10-26 RX ADMIN — AMIODARONE HYDROCHLORIDE 400 MG: 200 TABLET ORAL at 11:01

## 2023-10-26 RX ADMIN — Medication 40 MG: at 08:07

## 2023-10-26 RX ADMIN — ACETAMINOPHEN 975 MG: 325 TABLET, FILM COATED ORAL at 15:16

## 2023-10-26 RX ADMIN — ENOXAPARIN SODIUM 40 MG: 40 INJECTION SUBCUTANEOUS at 14:59

## 2023-10-26 RX ADMIN — OXYCODONE HYDROCHLORIDE 10 MG: 10 TABLET ORAL at 11:01

## 2023-10-26 RX ADMIN — ALBUTEROL SULFATE 4 PUFF: 90 AEROSOL, METERED RESPIRATORY (INHALATION) at 17:24

## 2023-10-26 RX ADMIN — ALBUTEROL SULFATE 4 PUFF: 90 AEROSOL, METERED RESPIRATORY (INHALATION) at 20:16

## 2023-10-26 RX ADMIN — OXYCODONE HYDROCHLORIDE 10 MG: 10 TABLET ORAL at 17:24

## 2023-10-26 RX ADMIN — DOXAZOSIN 1 MG: 4 TABLET ORAL at 12:39

## 2023-10-26 RX ADMIN — AMIODARONE HYDROCHLORIDE 400 MG: 200 TABLET ORAL at 20:15

## 2023-10-26 RX ADMIN — PHENOL 1 ML: 1.5 LIQUID ORAL at 08:02

## 2023-10-26 RX ADMIN — GABAPENTIN 100 MG: 100 CAPSULE ORAL at 22:54

## 2023-10-26 RX ADMIN — OXYCODONE HYDROCHLORIDE 10 MG: 10 TABLET ORAL at 23:03

## 2023-10-26 RX ADMIN — OXYCODONE HYDROCHLORIDE 10 MG: 10 TABLET ORAL at 00:08

## 2023-10-26 RX ADMIN — PHENOL 1 ML: 1.5 LIQUID ORAL at 17:24

## 2023-10-26 RX ADMIN — Medication 15 ML: at 11:01

## 2023-10-26 RX ADMIN — FUROSEMIDE 20 MG: 10 INJECTION, SOLUTION INTRAMUSCULAR; INTRAVENOUS at 11:02

## 2023-10-26 RX ADMIN — LIDOCAINE PATCH 4% 1 PATCH: 40 PATCH TOPICAL at 08:03

## 2023-10-26 RX ADMIN — TOPICAL ANESTHETIC 0.5 ML: 200 SPRAY DENTAL; PERIODONTAL at 03:33

## 2023-10-26 RX ADMIN — PHENOL 1 ML: 1.5 LIQUID ORAL at 20:44

## 2023-10-26 RX ADMIN — ACETAMINOPHEN 975 MG: 325 TABLET, FILM COATED ORAL at 05:46

## 2023-10-26 RX ADMIN — SODIUM PHOSPHATE, MONOBASIC, MONOHYDRATE AND SODIUM PHOSPHATE, DIBASIC, ANHYDROUS 15 MMOL: 142; 276 INJECTION, SOLUTION INTRAVENOUS at 16:00

## 2023-10-26 RX ADMIN — ALBUTEROL SULFATE 4 PUFF: 90 AEROSOL, METERED RESPIRATORY (INHALATION) at 13:25

## 2023-10-26 RX ADMIN — ALBUTEROL SULFATE 4 PUFF: 90 AEROSOL, METERED RESPIRATORY (INHALATION) at 08:07

## 2023-10-26 RX ADMIN — OXYCODONE HYDROCHLORIDE 10 MG: 10 TABLET ORAL at 05:46

## 2023-10-26 ASSESSMENT — ACTIVITIES OF DAILY LIVING (ADL)
ADLS_ACUITY_SCORE: 28

## 2023-10-26 NOTE — PROVIDER NOTIFICATION
Time of notification: 4:10 AM  Provider notified: Keerthi David   Reason for notification:  FYI:  Pt is due to void, bladdered scanned for 351 mls of urine. Pt does not want to be straight cathed, he states this is his baseline  Provider response: Will continue to monitor for now however if pt reaches over 500/600 mls of urine re-introduce the idea of straight cath

## 2023-10-26 NOTE — PROGRESS NOTES
Transfer  Transferred from:    Via:bed  Reason for transfer: Pt appropriate for 6B- improved patient condition  Family: Aware of transfer  Belongings: Received with pt  Chart: Received with pt  Medications: Meds received from old unit with pt  Code Status verified on armband: yes  2 RN Skin Assessment Completed By: Asher ENCARNACION   Med rec completed: yes  Suction/Ambu bag/Flowmeter at bedside: yes    Report received from:  4E nurse   Pt status: stable      Neuro: A&Ox4.  RUE numbness baseline since 90s   Cardiac: SR. VSS  Respiratory: Sating > 95% on RA.  GI/: Due to void,  difficulty w/ voiding, MD aware. PEG tube TF @ goal rate 20/m. FWF 30 ml.   Diet/appetite: NPO   Activity:  Assist of  1 to BR   Pain: At acceptable level on current regimen.  Has PCA and schedule oxy for pain, patient reported effective.   Skin: No new deficits noted.  LDA's: NG LIS  flush 30ml for patency, PCA pump 0.02mg/hr. Port amino drip 16.7 ml/hr    Plan: Continue with POC. Notify primary team with changes.

## 2023-10-26 NOTE — PROGRESS NOTES
Neuro: A&Ox4.  RUE numbness baseline.   Cardiac: SR. VSS.   Respiratory: Sating >95%  on RA. Coarse lung sounds at base.   GI/: Adequate urine output, armas patent. No B/M during shift. TF infusing 30 ml/hr increasing 10 ml q8 hr. Goal rate is 60 mL/hr. Per MD order. Patient tolerating TF.  No N/V noted  Diet/appetite:  NPO   Activity:  Assist of 1 to chair.   Pain: At acceptable level on current regimen.  Throat pain, used spray q1 hr, patient states slightly effective.   Skin: No new deficits noted. See flow sheet   LDA's: NG Low continuous  suction. Flush  NG 30ml for patency, PCA pump 0.02mg/hr.     Plan: Continue with POC. Notify primary team with changes.

## 2023-10-26 NOTE — PROGRESS NOTES
Neuro: A&Ox4 and is able to make needs known  Cardiac: On Teley. SR w/ PVSc. VSS.   Respiratory: Sating >90 on RA.  GI/: Pt had 50 mls UOP,  bladder scanned for 351 mls at 0330. Pt refused interventions, provider made aware. Pt had no BM occurrences during shift.  Diet/appetite:  Pt NPO,tolerating TF running at goal of 20 mls/hr with 30 mls Q4 flushes.  Activity:  Assist of 2 however, remained in bed during shift.   Pain: Provided PRN Hurricaine x1 for throat pain rated as 6/10  Skin: No new deficits noted.  LDA's: R. Cristina - Cath, R & L PIV, NG tube    Plan: Continue with POC. Notify primary team with changes.

## 2023-10-26 NOTE — PROGRESS NOTES
CLINICAL NUTRITION SERVICES - BRIEF NOTE     Nutrition Prescription    RECOMMENDATIONS FOR MDs/PROVIDERS TO ORDER:  Continue to monitor K/Mg/Phos as TF rate advances; replace as indicated.   --> Discussed current low PO4 levels (2.1) with MD who will add phos replacement protocol orders.     Recommendations already ordered by Registered Dietitian (RD):  Begin TF rate advancements of 10 mL Q8hrs as tolerated (and pending lytes K/Mg/Phos) to eventual goal rate of 60 mL/hr.     Goal EN regimen:   Osmolite 1.5 Gerber (or equivalent) at goal of  60ml/hr  (1440ml/day) + 1 pkt ProSource TF20 BID (2 pkts total) provides: 2320 kcals (27 kcal/kg), 130 g PRO (1.5 g pro/kg), 1097 ml free H20, 293 g CHO, and 0 g fiber daily.      - Do not advance TF rate unless Mg++ >1.5, K+ is >3, and phos >1.9     Future/Additional Recommendations:  Continue to monitor nutrition related findings per protocol    For last full RD assessment, see note dated 10/24/23    NEW FINDINGS   - Received call from thoracic team with request to begin advancing TFs by 10 mL Q8hrs towards goal rate of 60 mL/hr. Discussed plan with bedside RN and updated TF order. Added Prosource TF20 packets BID per initial RD note plan.     - Reviewed labs. K+ and Mg WNL. PO4 slightly depleted (2.1); not currently on replacement protocols. Discussed with primary team MD who will add phos replacement orders.     - No BM documented since admit. Monitor GI status. Miralax/Senna in place.     INTERVENTIONS  Implementation  Collaboration with other providers - Thoracic team MD, bedside RN  Enteral Nutrition - Begin rate advancement Q8hrs   Nutrition-related medication management - Add Prosource TF20    Monitoring/Evaluation  Will continue to monitor and evaluate per protocol.    Richmond Pittman, JANUSZ, LD, CNSC  6B work-room RD phone: *[phone]   6B/Obs RD pager: [phone]    Weekend/Holiday RD pager [phone]

## 2023-10-26 NOTE — PROGRESS NOTES
THORACIC & FOREGUT SURGERY    S:  No acute overnight events. Patient given amio bolus and drip yesterday for Afib, subsequently converted out and remains in normal sinus rhythm. Pain better controlled with scheduled Oxy, complaining mostly of throat pain. Straight cath'ed x1 during the day yesterday.     O:  Vitals:    10/25/23 1944 10/26/23 0332 10/26/23 0400 10/26/23 0743   BP:  127/78  139/83   BP Location:  Left arm  Left arm   Cuff Size:       Pulse:  77  66   Resp:  16  22   Temp: 97.8  F (36.6  C) 98.1  F (36.7  C)  98.3  F (36.8  C)   TempSrc: Oral Oral  Oral   SpO2:    97%   Weight:   107 kg (235 lb 14.3 oz)    Height:         Gen: A&Ox3, NAD, resting comfortably in bed  HEENT: NCAT, NG to LIS  Resp: Non-labored breathing on room air, right chest tube to water hardik  CV: Normal sinus rhythm, hemodynamically stable  Abd: Soft, non-distended, non-tender, incisions clean dry and intact with minimal strikethrough  Ext: Distal extremities warm, 1+ peripheral edema, moving all extremities     A/P: Alonso Copeland is a 72 y/o M with a PMHx of HLD, anemia, neuropathy, sensorineural hearing loss thyroid cancer and prostate cancer hx now POD#3 s/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 King. Patient admitted to SICU post-operatively for hemodynamic monitoring. Doing well post-operatively, pain controlled on current regimen, hemodynamically stable, transfer order placed POD#1 to intermediate care.      - Pain/Neuro: Dilaudid PCA 0.2 mg q10 min, Scheduled Oxy 10 mg per J tube q6h, Tylenol q8h Scheduled, Gabapentin 100 mg at bedtime scheduled, Robaxin 500 mg q6h PRN, Lidocaine patch q24h scheduled  - CV: Amio drip, continue cardiac monitoring  - Resp: Right chest tube to water seal, no air leak present, monitoring output, Albuterol inhaler QID  - GI: NG to LIS, increase tube feeds 10 mL/hr q8h today, bowel regimen, ensure NG tube working throughout the day  - :  Monitoring urine output  - ID: Perioperative antibiotics completed, continuing to monitor fever curve  - Heme: Daily CBCs, trend hemoglobin  - Renal/FEN: D5 1/2 NS + KCl @ 30 mL/hr  Ppx: Lovenox 40 mg q24h, Protonix  Drains/lines/tubes with settings : Chest tube x1 to water seal  - Lab may draw from the port  PT/OT/SW: PT OT  Dispo ICU, ok to transfer to  when bed becomes available    Colten May MD   PGY-1 Surgery

## 2023-10-27 ENCOUNTER — APPOINTMENT (OUTPATIENT)
Dept: OCCUPATIONAL THERAPY | Facility: CLINIC | Age: 73
DRG: 327 | End: 2023-10-27
Attending: STUDENT IN AN ORGANIZED HEALTH CARE EDUCATION/TRAINING PROGRAM
Payer: COMMERCIAL

## 2023-10-27 ENCOUNTER — APPOINTMENT (OUTPATIENT)
Dept: PHYSICAL THERAPY | Facility: CLINIC | Age: 73
DRG: 327 | End: 2023-10-27
Attending: STUDENT IN AN ORGANIZED HEALTH CARE EDUCATION/TRAINING PROGRAM
Payer: COMMERCIAL

## 2023-10-27 ENCOUNTER — APPOINTMENT (OUTPATIENT)
Dept: GENERAL RADIOLOGY | Facility: CLINIC | Age: 73
DRG: 327 | End: 2023-10-27
Attending: STUDENT IN AN ORGANIZED HEALTH CARE EDUCATION/TRAINING PROGRAM
Payer: COMMERCIAL

## 2023-10-27 ENCOUNTER — HOME INFUSION (PRE-WILLOW HOME INFUSION) (OUTPATIENT)
Dept: PHARMACY | Facility: CLINIC | Age: 73
End: 2023-10-27

## 2023-10-27 LAB
ANION GAP SERPL CALCULATED.3IONS-SCNC: 12 MMOL/L (ref 7–15)
ANION GAP SERPL CALCULATED.3IONS-SCNC: 9 MMOL/L (ref 7–15)
ATRIAL RATE - MUSE: 75 BPM
BUN SERPL-MCNC: 21.8 MG/DL (ref 8–23)
BUN SERPL-MCNC: 23.8 MG/DL (ref 8–23)
CA-I BLD-MCNC: 4.3 MG/DL (ref 4.4–5.2)
CALCIUM SERPL-MCNC: 8.3 MG/DL (ref 8.8–10.2)
CALCIUM SERPL-MCNC: 8.8 MG/DL (ref 8.8–10.2)
CHLORIDE SERPL-SCNC: 105 MMOL/L (ref 98–107)
CHLORIDE SERPL-SCNC: 106 MMOL/L (ref 98–107)
CREAT SERPL-MCNC: 0.92 MG/DL (ref 0.67–1.17)
CREAT SERPL-MCNC: 0.95 MG/DL (ref 0.67–1.17)
DEPRECATED HCO3 PLAS-SCNC: 25 MMOL/L (ref 22–29)
DEPRECATED HCO3 PLAS-SCNC: 26 MMOL/L (ref 22–29)
DIASTOLIC BLOOD PRESSURE - MUSE: NORMAL MMHG
EGFRCR SERPLBLD CKD-EPI 2021: 83 ML/MIN/1.73M2
EGFRCR SERPLBLD CKD-EPI 2021: 87 ML/MIN/1.73M2
ERYTHROCYTE [DISTWIDTH] IN BLOOD BY AUTOMATED COUNT: 12.2 % (ref 10–15)
GLUCOSE SERPL-MCNC: 124 MG/DL (ref 70–99)
GLUCOSE SERPL-MCNC: 138 MG/DL (ref 70–99)
HCT VFR BLD AUTO: 32.5 % (ref 40–53)
HGB BLD-MCNC: 10.7 G/DL (ref 13.3–17.7)
INTERPRETATION ECG - MUSE: NORMAL
MAGNESIUM SERPL-MCNC: 2 MG/DL (ref 1.7–2.3)
MAGNESIUM SERPL-MCNC: 2 MG/DL (ref 1.7–2.3)
MCH RBC QN AUTO: 32.6 PG (ref 26.5–33)
MCHC RBC AUTO-ENTMCNC: 32.9 G/DL (ref 31.5–36.5)
MCV RBC AUTO: 99 FL (ref 78–100)
P AXIS - MUSE: 20 DEGREES
PHOSPHATE SERPL-MCNC: 2.8 MG/DL (ref 2.5–4.5)
PHOSPHATE SERPL-MCNC: 2.9 MG/DL (ref 2.5–4.5)
PLATELET # BLD AUTO: 115 10E3/UL (ref 150–450)
POTASSIUM SERPL-SCNC: 3.9 MMOL/L (ref 3.4–5.3)
POTASSIUM SERPL-SCNC: 4.2 MMOL/L (ref 3.4–5.3)
PR INTERVAL - MUSE: 164 MS
QRS DURATION - MUSE: 90 MS
QT - MUSE: 388 MS
QTC - MUSE: 433 MS
R AXIS - MUSE: -20 DEGREES
RBC # BLD AUTO: 3.28 10E6/UL (ref 4.4–5.9)
SODIUM SERPL-SCNC: 141 MMOL/L (ref 135–145)
SODIUM SERPL-SCNC: 142 MMOL/L (ref 135–145)
SYSTOLIC BLOOD PRESSURE - MUSE: NORMAL MMHG
T AXIS - MUSE: 0 DEGREES
VENTRICULAR RATE- MUSE: 75 BPM
WBC # BLD AUTO: 6.2 10E3/UL (ref 4–11)

## 2023-10-27 PROCEDURE — 83735 ASSAY OF MAGNESIUM: CPT

## 2023-10-27 PROCEDURE — 250N000011 HC RX IP 250 OP 636: Mod: JZ | Performed by: STUDENT IN AN ORGANIZED HEALTH CARE EDUCATION/TRAINING PROGRAM

## 2023-10-27 PROCEDURE — 250N000013 HC RX MED GY IP 250 OP 250 PS 637: Performed by: STUDENT IN AN ORGANIZED HEALTH CARE EDUCATION/TRAINING PROGRAM

## 2023-10-27 PROCEDURE — 258N000003 HC RX IP 258 OP 636

## 2023-10-27 PROCEDURE — 36415 COLL VENOUS BLD VENIPUNCTURE: CPT

## 2023-10-27 PROCEDURE — 71045 X-RAY EXAM CHEST 1 VIEW: CPT | Mod: 26 | Performed by: RADIOLOGY

## 2023-10-27 PROCEDURE — 120N000003 HC R&B IMCU UMMC

## 2023-10-27 PROCEDURE — 97530 THERAPEUTIC ACTIVITIES: CPT | Mod: GP

## 2023-10-27 PROCEDURE — 93010 ELECTROCARDIOGRAM REPORT: CPT | Performed by: INTERNAL MEDICINE

## 2023-10-27 PROCEDURE — 83735 ASSAY OF MAGNESIUM: CPT | Performed by: STUDENT IN AN ORGANIZED HEALTH CARE EDUCATION/TRAINING PROGRAM

## 2023-10-27 PROCEDURE — 97116 GAIT TRAINING THERAPY: CPT | Mod: GP

## 2023-10-27 PROCEDURE — 82330 ASSAY OF CALCIUM: CPT

## 2023-10-27 PROCEDURE — 80048 BASIC METABOLIC PNL TOTAL CA: CPT

## 2023-10-27 PROCEDURE — 36591 DRAW BLOOD OFF VENOUS DEVICE: CPT | Performed by: STUDENT IN AN ORGANIZED HEALTH CARE EDUCATION/TRAINING PROGRAM

## 2023-10-27 PROCEDURE — 250N000013 HC RX MED GY IP 250 OP 250 PS 637

## 2023-10-27 PROCEDURE — 97535 SELF CARE MNGMENT TRAINING: CPT | Mod: GO

## 2023-10-27 PROCEDURE — 93005 ELECTROCARDIOGRAM TRACING: CPT

## 2023-10-27 PROCEDURE — 85027 COMPLETE CBC AUTOMATED: CPT | Performed by: STUDENT IN AN ORGANIZED HEALTH CARE EDUCATION/TRAINING PROGRAM

## 2023-10-27 PROCEDURE — 250N000009 HC RX 250

## 2023-10-27 PROCEDURE — 71045 X-RAY EXAM CHEST 1 VIEW: CPT

## 2023-10-27 PROCEDURE — 80048 BASIC METABOLIC PNL TOTAL CA: CPT | Performed by: STUDENT IN AN ORGANIZED HEALTH CARE EDUCATION/TRAINING PROGRAM

## 2023-10-27 PROCEDURE — 250N000011 HC RX IP 250 OP 636

## 2023-10-27 PROCEDURE — 84100 ASSAY OF PHOSPHORUS: CPT | Performed by: STUDENT IN AN ORGANIZED HEALTH CARE EDUCATION/TRAINING PROGRAM

## 2023-10-27 PROCEDURE — 84100 ASSAY OF PHOSPHORUS: CPT

## 2023-10-27 RX ORDER — METHOCARBAMOL 500 MG/1
1000 TABLET, FILM COATED ORAL EVERY 6 HOURS PRN
Status: DISCONTINUED | OUTPATIENT
Start: 2023-10-27 | End: 2023-11-01 | Stop reason: HOSPADM

## 2023-10-27 RX ORDER — OXYCODONE HCL 5 MG/5 ML
10 SOLUTION, ORAL ORAL EVERY 6 HOURS
Status: DISCONTINUED | OUTPATIENT
Start: 2023-10-27 | End: 2023-11-01 | Stop reason: HOSPADM

## 2023-10-27 RX ADMIN — PHENOL 1 ML: 1.5 LIQUID ORAL at 21:10

## 2023-10-27 RX ADMIN — ALBUTEROL SULFATE 4 PUFF: 90 AEROSOL, METERED RESPIRATORY (INHALATION) at 09:34

## 2023-10-27 RX ADMIN — GABAPENTIN 100 MG: 100 CAPSULE ORAL at 23:23

## 2023-10-27 RX ADMIN — ALBUTEROL SULFATE 4 PUFF: 90 AEROSOL, METERED RESPIRATORY (INHALATION) at 15:11

## 2023-10-27 RX ADMIN — Medication 40 MG: at 08:29

## 2023-10-27 RX ADMIN — ENOXAPARIN SODIUM 40 MG: 40 INJECTION SUBCUTANEOUS at 14:54

## 2023-10-27 RX ADMIN — SENNOSIDES AND DOCUSATE SODIUM 2 TABLET: 8.6; 5 TABLET ORAL at 21:13

## 2023-10-27 RX ADMIN — POLYETHYLENE GLYCOL 3350 17 G: 17 POWDER, FOR SOLUTION ORAL at 08:27

## 2023-10-27 RX ADMIN — ALBUTEROL SULFATE 4 PUFF: 90 AEROSOL, METERED RESPIRATORY (INHALATION) at 20:55

## 2023-10-27 RX ADMIN — LIDOCAINE PATCH 4% 1 PATCH: 40 PATCH TOPICAL at 08:27

## 2023-10-27 RX ADMIN — PHENOL 1 ML: 1.5 LIQUID ORAL at 11:53

## 2023-10-27 RX ADMIN — AMIODARONE HYDROCHLORIDE 1 MG/MIN: 50 INJECTION, SOLUTION INTRAVENOUS at 20:35

## 2023-10-27 RX ADMIN — SENNOSIDES AND DOCUSATE SODIUM 2 TABLET: 8.6; 5 TABLET ORAL at 08:26

## 2023-10-27 RX ADMIN — METHOCARBAMOL 1000 MG: 500 TABLET ORAL at 04:32

## 2023-10-27 RX ADMIN — Medication 15 ML: at 11:52

## 2023-10-27 RX ADMIN — ALBUTEROL SULFATE 4 PUFF: 90 AEROSOL, METERED RESPIRATORY (INHALATION) at 11:51

## 2023-10-27 RX ADMIN — OXYCODONE HYDROCHLORIDE 10 MG: 5 SOLUTION ORAL at 11:52

## 2023-10-27 RX ADMIN — DOXAZOSIN 1 MG: 4 TABLET ORAL at 09:34

## 2023-10-27 RX ADMIN — OXYCODONE HYDROCHLORIDE 10 MG: 5 SOLUTION ORAL at 18:42

## 2023-10-27 RX ADMIN — PHENOL 1 ML: 1.5 LIQUID ORAL at 08:26

## 2023-10-27 RX ADMIN — Medication: at 12:08

## 2023-10-27 RX ADMIN — PHENOL 1 ML: 1.5 LIQUID ORAL at 18:43

## 2023-10-27 RX ADMIN — AMIODARONE HYDROCHLORIDE 150 MG: 1.5 INJECTION, SOLUTION INTRAVENOUS at 16:26

## 2023-10-27 RX ADMIN — OXYCODONE HYDROCHLORIDE 10 MG: 10 TABLET ORAL at 06:02

## 2023-10-27 RX ADMIN — AMIODARONE HYDROCHLORIDE 400 MG: 200 TABLET ORAL at 08:26

## 2023-10-27 ASSESSMENT — ACTIVITIES OF DAILY LIVING (ADL)
ADLS_ACUITY_SCORE: 28
ADLS_ACUITY_SCORE: 28
DEPENDENT_IADLS:: INDEPENDENT
ADLS_ACUITY_SCORE: 28

## 2023-10-27 NOTE — PLAN OF CARE
Goal Outcome Evaluation:      Plan of Care Reviewed With: patient, spouse    Overall Patient Progress: improvingOverall Patient Progress: improving    Outcome Evaluation: Anticipate discharge to home with home care and home infusion    KYLE Hinojosa  Phone: [phone]  Pager: [phone]    SEARCHABLE in [facility] - search CARE COORDINATOR     [place] ([id number]) Saturday & Sunday; ([phone]) [facility] Recognized Holidays     Units: 5A, 5B & 5C  Pager: [phone]    Units: 6B, 6C & 6D    Pager: [phone]    Units: 7A, 7B, 7C & 7D    Pager: [phone]    Units: 6A & ICU   Pager: [phone]    Units: 5 Ortho, 5MS & WB ED Pager: [phone]    Units: 6MS, 8A & 10 ICU  Pager [phone]

## 2023-10-27 NOTE — PLAN OF CARE
Neuro: A&Ox4. Patient awake most of day, no problems identified.  Cardiac: Patient consistent asymptomatic afib with AM runs of afib RVR and SVT. Frequent PVCs. VSS.  Respiratory: Sating >92% on RA.  GI/: Adequate urine output - armas care and CAUTI note completed. LBM 10/22 - team aware and bowel regimen continued.  Diet/appetite: Pt NPO. Tube feeds advanced from 40mL/hr this AM to 50mL/hr to 60mL/hr this PM  Activity:  Ax1 standing at bedside and in chair.   Pain: At acceptable level on current regimen. PCA at 0.2mg q10min, 1.2mg/hr lockout.  Skin: No new deficits noted. Lap sites and L hand edema  LDA's: R chest tube to water seal, NG to low continuous suction with q6 FWF 30mL, PEG tube running TF at 60mL/hr with q4hr 30mL FWF. PCA infusing into R PIV. Port-a-cath SL.    Plan: Continue with POC. Notify primary team with changes.      Goal Outcome Evaluation:  Problem: Dysrhythmia  Goal: Normalized Cardiac Rhythm  Outcome: Not Progressing  Intervention: Monitor and Manage Cardiac Rhythm Effect  Recent Flowsheet Documentation  Taken 10/27/2023 1513 by Alyssa Maxwell, RN  VTE Prevention/Management:   SCDs (sequential compression devices) off   patient refused intervention

## 2023-10-27 NOTE — PROGRESS NOTES
Patient meets Medicare criteria for enteral tube feeds through their [facility] Medicare Advantage plan. Pt has an 80/20 coverage until they have met their OOP of $3000 (patient has met $2414.33 at this time). Ordering provider must document anticipated JEIMY of 90 days or more in patient's medical chart for Medicare to cover. If not documented, then there is no coverage and patient will be self pay.     ([facility]) In reference to admission date 10/23/2023 to check for enteral coverage.     Please contact Intake with any questions, [phone] or In Basket pool, [facility] ([phone]).

## 2023-10-27 NOTE — PROGRESS NOTES
Notified provider about indwelling armas catheter discussed removal or continued need.    Did provider choose to remove indwelling armas catheter? NO    Provider's armas indication for keeping indwelling arams catheter: Indication for continued use: Retention    Is there an order for indwelling armas catheter? YES

## 2023-10-27 NOTE — CONSULTS
Care Management Initial Consult    General Information  Assessment completed with: Patient,    Type of CM/SW Visit: Initial Assessment    Primary Care Provider verified and updated as needed: Yes   Readmission within the last 30 days:        Reason for Consult: discharge planning  Advance Care Planning: Advance Care Planning Reviewed: present on chart          Communication Assessment  Patient's communication style: spoken language (English or Bilingual)    Hearing Difficulty or Deaf: no   Wear Glasses or Blind: yes    Cognitive  Cognitive/Neuro/Behavioral: WDL  Level of Consciousness: alert  Arousal Level: opens eyes spontaneously  Orientation: oriented x 4  Mood/Behavior: calm, cooperative  Best Language: 0 - No aphasia  Speech: clear, spontaneous, logical    Living Environment:   People in home: spouse     Current living Arrangements: house      Able to return to prior arrangements: yes       Family/Social Support:  Care provided by: self, spouse/significant other  Provides care for:    Marital Status:   Wife          Description of Support System: Supportive, Involved    Support Assessment: Adequate family and caregiver support, Adequate social supports    Current Resources:   Patient receiving home care services: No     Community Resources: None  Equipment currently used at home: none  Supplies currently used at home: None     Employment/Financial:  Employment Status: retired        Financial Concerns: none           Does the patient's insurance plan have a 3 day qualifying hospital stay waiver?  No    Lifestyle & Psychosocial Needs:  Social Determinants of Health     Food Insecurity: Not on file   Depression: Not at risk (9/28/2023)    PHQ-2     PHQ-2 Score: 0   Housing Stability: Not on file   Tobacco Use: Low Risk  (10/25/2023)    Patient History     Smoking Tobacco Use: Never     Smokeless Tobacco Use: Never     Passive Exposure: Never   Financial Resource Strain: Not on file   Alcohol Use: Not on  "file   Transportation Needs: Not on file   Physical Activity: Not on file   Interpersonal Safety: Not on file   Stress: Not on file   Social Connections: Not on file       Functional Status:  Prior to admission patient needed assistance:   Dependent ADLs:: Independent  Dependent IADLs:: Independent  Assesssment of Functional Status: At functional baseline    Mental Health Status:  Mental Health Status: No Current Concerns       Chemical Dependency Status:  Chemical Dependency Status: No Current Concerns             Values/Beliefs:  Spiritual, Cultural Beliefs, Jewish Practices, Values that affect care: no               Additional Information:  Patient is a 73 year old male with past medical history of of HLD, anemia, neuropathy, sensorineural hearing loss thyroid cancer and prostate cancer hx now POD#3 s/p stage 2 of minimally invasive esophagectomy.      Patient is on tube feedings which he is anticipated to discharge on.  Referral sent to [facility].  Per I \"Patient meets Medicare criteria for enteral tube feeds through their [facility] Medicare Advantage plan. Pt has an 80/20 coverage until they have met their OOP of $3000 (patient has met $2414.33 at this time). Ordering provider must document anticipated JEIMY of 90 days or more in patient's medical chart for Medicare to cover.\"    Met with patient and spouse, Anna Marie, at bedside to introduce RNCC role and discuss discharge planning.  Pt lives with his spouse in a home in [place].  Anna Marie will assist with pt's cares including TF's at discharge.  Pt and Anna Marie would like to attend a PLC class, consult placed.  They are also interested in having a home care nurse. Referral sent through [facility] is able to accept. Orders in place. They had no questions or concerns regarding discharge at this time.  RNCC will continue to follow and assist with discharge planning.      [facility])  Phone: [phone]  Fax: " [phone]    [facility]()  Phone: [phone]  Fax: [phone]           KYLE Hinojosa  Phone: [phone]  Pager: [phone]    SEARCHABLE in [facility]COM - search CARE COORDINATOR     [place] [facility] [place] ([id number]) Saturday & Sunday; ([phone]) [facility]ognized Holidays     Units: 5A, 5B & 5C  Pager: [phone]    Units: 6B, 6C & 6D    Pager: [phone]    Units: 7A, 7B, 7C & 7D    Pager: [phone]    Units: 6A & ICU   Pager: [phone]    Units: 5 Ortho, 5MS & WB ED Pager: [phone]    Units: 6MS, 8A & 10 ICU  Pager [phone]

## 2023-10-27 NOTE — PROGRESS NOTES
I was paged by the RN that patient was still in AF with RVR (HR 130s). I assessed the patient bedside. Patient asymptomatic. HR 120s-140s on telemetry with frequent PVCs. BP WNL. Discussed with Dr Ina Burnette. Ordered BMP, MG, Phos, ical. Will follow results. Discontinued oral Amiodarone. Started on Amiodarone drip.     Keerthi ZHOU  PGY1 General Surgery  [facility]  Surgery Cross Cover

## 2023-10-27 NOTE — PROGRESS NOTES
THORACIC & FOREGUT SURGERY    S:  No acute overnight events. Remains in sinus rhythm, pain well controled. Still no Bm but he feels like he is going to have a BM soon,passing some gas.      O:  Vitals:    10/26/23 2300 10/27/23 0450 10/27/23 0659 10/27/23 0735   BP:  (!) 146/62  132/81   BP Location:  Left arm  Left arm   Cuff Size:       Pulse:   72    Resp: 16 14 (!) 0    Temp: 98.1  F (36.7  C) 98.5  F (36.9  C)  98.3  F (36.8  C)   TempSrc: Oral Oral  Oral   SpO2:   95%    Weight:   103.8 kg (228 lb 13.4 oz)    Height:         Gen: A&Ox3, NAD, resting comfortably in bed  HEENT: NCAT, NG to LIS  Resp: Non-labored breathing on room air, right chest tube to water seal without air leak  CV: Normal sinus rhythm, hemodynamically stable  Abd: Soft, non-distended, non-tender, incisions clean dry and intact with minimal strikethrough  Ext: Distal extremities warm, 1+ peripheral edema, moving all extremities     A/P: Alonso Copeland is a 72 y/o M with a PMHx of HLD, anemia, neuropathy, sensorineural hearing loss thyroid cancer and prostate cancer hx now POD#3 s/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 King. Patient admitted to SICU post-operatively for hemodynamic monitoring. Doing well post-operatively, pain controlled on current regimen, hemodynamically stable, pending timing for esophagram/ video swallow study, likely Monday      - Pain/Neuro: Dilaudid PCA 0.2 mg q10 min, Scheduled Oxy 10 mg per J tube q6h, Tylenol q8h Scheduled, Gabapentin 100 mg at bedtime scheduled, Robaxin 500 mg q6h PRN, Lidocaine patch q24h scheduled  - CV: PO amio, continue cardiac monitoring   - EKG this AM- sinus rhythm  - Resp: Right chest tube to water seal, no air leak present, monitoring output, Albuterol inhaler QID  - GI: NG to LIS with q6hr flushing , increase tube feeds 10 mL/hr q8h to goal (60), bowel regimen, ensure NG tube working throughout the day  - : Monitoring urine  output  - ID: Perioperative antibiotics completed, continuing to monitor fever curve  - Heme: Daily CBCs, trend hemoglobin  - Renal/FEN: No IVF  Ppx: Lovenox 40 mg q24h, Protonix  Drains/lines/tubes with settings : Chest tube x1 to water seal  - Lab may draw from the port  PT/OT/SW: PT OT  Dispo 6B      Daniel Oropeza MD  Surgery PGY3

## 2023-10-27 NOTE — PROGRESS NOTES
Neuro: A&Ox4   Cardiac: On Te[place]. SR VSS.           Respiratory: Sating >91 on RA.  GI/: Adequate UOP with armas in place. Pt had no BM occurrences during shift.  Diet/appetite:  Pt NPO and is tolerating TF running 40 mls/hr with 30 mls Q4 flushes. Increase TF by 10 mls/hr Q8 until goal of 60 mls/hr is reached  Activity:  Assist of 1 up to chair however, remained in bed during shift.   Pain: Provided PRN phenol x1 for throat pain rated as 6/10  Skin: No new deficits noted.  LDA's: R. Cristina - Cath, R & L PIV, NG tube     Plan: Continue with POC. Notify primary team with changes.

## 2023-10-28 ENCOUNTER — APPOINTMENT (OUTPATIENT)
Dept: GENERAL RADIOLOGY | Facility: CLINIC | Age: 73
DRG: 327 | End: 2023-10-28
Attending: STUDENT IN AN ORGANIZED HEALTH CARE EDUCATION/TRAINING PROGRAM
Payer: COMMERCIAL

## 2023-10-28 LAB
ANION GAP SERPL CALCULATED.3IONS-SCNC: 11 MMOL/L (ref 7–15)
BUN SERPL-MCNC: 25.1 MG/DL (ref 8–23)
CALCIUM SERPL-MCNC: 8.6 MG/DL (ref 8.8–10.2)
CHLORIDE SERPL-SCNC: 107 MMOL/L (ref 98–107)
CREAT SERPL-MCNC: 0.93 MG/DL (ref 0.67–1.17)
DEPRECATED HCO3 PLAS-SCNC: 24 MMOL/L (ref 22–29)
EGFRCR SERPLBLD CKD-EPI 2021: 86 ML/MIN/1.73M2
ERYTHROCYTE [DISTWIDTH] IN BLOOD BY AUTOMATED COUNT: 12.3 % (ref 10–15)
GLUCOSE SERPL-MCNC: 131 MG/DL (ref 70–99)
HCT VFR BLD AUTO: 33.9 % (ref 40–53)
HGB BLD-MCNC: 11 G/DL (ref 13.3–17.7)
MAGNESIUM SERPL-MCNC: 2.2 MG/DL (ref 1.7–2.3)
MCH RBC QN AUTO: 32 PG (ref 26.5–33)
MCHC RBC AUTO-ENTMCNC: 32.4 G/DL (ref 31.5–36.5)
MCV RBC AUTO: 99 FL (ref 78–100)
PHOSPHATE SERPL-MCNC: 3.2 MG/DL (ref 2.5–4.5)
PLATELET # BLD AUTO: 129 10E3/UL (ref 150–450)
POTASSIUM SERPL-SCNC: 4 MMOL/L (ref 3.4–5.3)
RBC # BLD AUTO: 3.44 10E6/UL (ref 4.4–5.9)
SODIUM SERPL-SCNC: 142 MMOL/L (ref 135–145)
WBC # BLD AUTO: 6.1 10E3/UL (ref 4–11)

## 2023-10-28 PROCEDURE — 250N000009 HC RX 250

## 2023-10-28 PROCEDURE — 250N000011 HC RX IP 250 OP 636

## 2023-10-28 PROCEDURE — 93005 ELECTROCARDIOGRAM TRACING: CPT

## 2023-10-28 PROCEDURE — 36415 COLL VENOUS BLD VENIPUNCTURE: CPT | Performed by: STUDENT IN AN ORGANIZED HEALTH CARE EDUCATION/TRAINING PROGRAM

## 2023-10-28 PROCEDURE — 250N000011 HC RX IP 250 OP 636: Mod: JZ | Performed by: STUDENT IN AN ORGANIZED HEALTH CARE EDUCATION/TRAINING PROGRAM

## 2023-10-28 PROCEDURE — 80048 BASIC METABOLIC PNL TOTAL CA: CPT | Performed by: STUDENT IN AN ORGANIZED HEALTH CARE EDUCATION/TRAINING PROGRAM

## 2023-10-28 PROCEDURE — 250N000013 HC RX MED GY IP 250 OP 250 PS 637: Performed by: STUDENT IN AN ORGANIZED HEALTH CARE EDUCATION/TRAINING PROGRAM

## 2023-10-28 PROCEDURE — 85027 COMPLETE CBC AUTOMATED: CPT | Performed by: STUDENT IN AN ORGANIZED HEALTH CARE EDUCATION/TRAINING PROGRAM

## 2023-10-28 PROCEDURE — 93010 ELECTROCARDIOGRAM REPORT: CPT | Performed by: INTERNAL MEDICINE

## 2023-10-28 PROCEDURE — 250N000013 HC RX MED GY IP 250 OP 250 PS 637

## 2023-10-28 PROCEDURE — 71045 X-RAY EXAM CHEST 1 VIEW: CPT | Mod: 26 | Performed by: RADIOLOGY

## 2023-10-28 PROCEDURE — 83735 ASSAY OF MAGNESIUM: CPT | Performed by: STUDENT IN AN ORGANIZED HEALTH CARE EDUCATION/TRAINING PROGRAM

## 2023-10-28 PROCEDURE — 258N000003 HC RX IP 258 OP 636

## 2023-10-28 PROCEDURE — 71045 X-RAY EXAM CHEST 1 VIEW: CPT

## 2023-10-28 PROCEDURE — 120N000003 HC R&B IMCU UMMC

## 2023-10-28 PROCEDURE — 84100 ASSAY OF PHOSPHORUS: CPT | Performed by: STUDENT IN AN ORGANIZED HEALTH CARE EDUCATION/TRAINING PROGRAM

## 2023-10-28 RX ORDER — POLYETHYLENE GLYCOL 3350 17 G/17G
17 POWDER, FOR SOLUTION ORAL DAILY
Status: DISCONTINUED | OUTPATIENT
Start: 2023-10-28 | End: 2023-11-01 | Stop reason: HOSPADM

## 2023-10-28 RX ORDER — IBUPROFEN 100 MG/5ML
400 SUSPENSION, ORAL (FINAL DOSE FORM) ORAL EVERY 6 HOURS
Status: DISCONTINUED | OUTPATIENT
Start: 2023-10-28 | End: 2023-11-01 | Stop reason: HOSPADM

## 2023-10-28 RX ADMIN — PHENOL 1 ML: 1.5 LIQUID ORAL at 08:23

## 2023-10-28 RX ADMIN — OXYCODONE HYDROCHLORIDE 10 MG: 5 SOLUTION ORAL at 01:18

## 2023-10-28 RX ADMIN — OXYCODONE HYDROCHLORIDE 10 MG: 5 SOLUTION ORAL at 07:43

## 2023-10-28 RX ADMIN — SENNOSIDES AND DOCUSATE SODIUM 2 TABLET: 8.6; 5 TABLET ORAL at 07:44

## 2023-10-28 RX ADMIN — PHENOL 1 ML: 1.5 LIQUID ORAL at 06:40

## 2023-10-28 RX ADMIN — IBUPROFEN 400 MG: 200 SUSPENSION ORAL at 16:05

## 2023-10-28 RX ADMIN — DOXAZOSIN 1 MG: 4 TABLET ORAL at 07:44

## 2023-10-28 RX ADMIN — IBUPROFEN 400 MG: 200 SUSPENSION ORAL at 22:50

## 2023-10-28 RX ADMIN — BISACODYL 10 MG: 10 SUPPOSITORY RECTAL at 02:09

## 2023-10-28 RX ADMIN — OXYCODONE HYDROCHLORIDE 10 MG: 5 SOLUTION ORAL at 11:32

## 2023-10-28 RX ADMIN — LIDOCAINE PATCH 4% 1 PATCH: 40 PATCH TOPICAL at 07:44

## 2023-10-28 RX ADMIN — AMIODARONE HYDROCHLORIDE 0.5 MG/MIN: 50 INJECTION, SOLUTION INTRAVENOUS at 21:48

## 2023-10-28 RX ADMIN — GABAPENTIN 100 MG: 100 CAPSULE ORAL at 22:50

## 2023-10-28 RX ADMIN — SENNOSIDES AND DOCUSATE SODIUM 2 TABLET: 8.6; 5 TABLET ORAL at 21:16

## 2023-10-28 RX ADMIN — ENOXAPARIN SODIUM 40 MG: 40 INJECTION SUBCUTANEOUS at 16:05

## 2023-10-28 RX ADMIN — PHENOL 1 ML: 1.5 LIQUID ORAL at 16:09

## 2023-10-28 RX ADMIN — ACETAMINOPHEN 650 MG: 325 TABLET, FILM COATED ORAL at 03:59

## 2023-10-28 RX ADMIN — Medication 5 MG: at 21:16

## 2023-10-28 RX ADMIN — BISACODYL 10 MG: 10 SUPPOSITORY RECTAL at 23:18

## 2023-10-28 RX ADMIN — ACETAMINOPHEN 650 MG: 325 TABLET, FILM COATED ORAL at 16:15

## 2023-10-28 RX ADMIN — PHENOL 1 ML: 1.5 LIQUID ORAL at 21:17

## 2023-10-28 RX ADMIN — AMIODARONE HYDROCHLORIDE 0.5 MG/MIN: 50 INJECTION, SOLUTION INTRAVENOUS at 01:14

## 2023-10-28 RX ADMIN — ALBUTEROL SULFATE 4 PUFF: 90 AEROSOL, METERED RESPIRATORY (INHALATION) at 16:07

## 2023-10-28 RX ADMIN — OXYCODONE HYDROCHLORIDE 10 MG: 5 SOLUTION ORAL at 17:36

## 2023-10-28 RX ADMIN — IBUPROFEN 400 MG: 200 SUSPENSION ORAL at 11:32

## 2023-10-28 RX ADMIN — PHENOL 1 ML: 1.5 LIQUID ORAL at 11:31

## 2023-10-28 RX ADMIN — Medication 40 MG: at 07:45

## 2023-10-28 RX ADMIN — ALBUTEROL SULFATE 4 PUFF: 90 AEROSOL, METERED RESPIRATORY (INHALATION) at 07:44

## 2023-10-28 RX ADMIN — Medication 15 ML: at 11:32

## 2023-10-28 RX ADMIN — PHENOL 1 ML: 1.5 LIQUID ORAL at 17:36

## 2023-10-28 RX ADMIN — POLYETHYLENE GLYCOL 3350 17 G: 17 POWDER, FOR SOLUTION ORAL at 11:32

## 2023-10-28 RX ADMIN — ALBUTEROL SULFATE 4 PUFF: 90 AEROSOL, METERED RESPIRATORY (INHALATION) at 11:31

## 2023-10-28 RX ADMIN — ACETAMINOPHEN 650 MG: 325 TABLET, FILM COATED ORAL at 08:35

## 2023-10-28 RX ADMIN — ALBUTEROL SULFATE 4 PUFF: 90 AEROSOL, METERED RESPIRATORY (INHALATION) at 21:17

## 2023-10-28 RX ADMIN — ACETAMINOPHEN 650 MG: 325 TABLET, FILM COATED ORAL at 21:15

## 2023-10-28 ASSESSMENT — ACTIVITIES OF DAILY LIVING (ADL)
ADLS_ACUITY_SCORE: 28

## 2023-10-28 NOTE — PROGRESS NOTES
THORACIC & FOREGUT SURGERY    S:  No acute overnight events. Went into afib last night and was restarted on amio. Feels well, walking around. Pain well controlled     O:  Vitals:    10/28/23 0130 10/28/23 0200 10/28/23 0300 10/28/23 0400   BP: (!) 143/66 120/72 (!) 145/80 (!) 143/79   BP Location: Right arm Right arm Right arm Right arm   Cuff Size: Adult Regular Adult Regular     Pulse: 78 65 90 78   Resp:       Temp:       TempSrc:       SpO2:    98%   Weight:   104.2 kg (229 lb 11.5 oz)    Height:         Gen: A&Ox3, NAD, resting comfortably in bed  HEENT: NCAT, NG to LIS  Resp: Non-labored breathing on room air, right chest tube to water seal without air leak  CV: Normal sinus rhythm, hemodynamically stable  Abd: Soft, non-distended, non-tender, incisions clean dry and intact with minimal strikethrough  Ext: Distal extremities warm, no peripheral edema, moving all extremities     A/P: Alonso Copeland is a 74 y/o M with a PMHx of HLD, anemia, neuropathy, sensorineural hearing loss thyroid cancer and prostate cancer hx now s/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 King. Patient admitted to SICU post-operatively for hemodynamic monitoring. Doing well post-operatively, pain controlled on current regimen, hemodynamically stable, pending timing for esophagram/ video swallow study, likely Monday.  Patient did have recurrent A-fib with RVR and needed another bolus as well as converted back to IV amiodarone on 10/27/2023     - Pain/Neuro: Schedule tylenol and motrin, Scheduled Oxy 10 mg per J tube q6h, Tylenol q8h Scheduled, Gabapentin 100 mg at bedtime scheduled, Robaxin 500 mg q6h PRN, Lidocaine patch q24h scheduled. Discontinued PCA  - CV: IV amio, continue cardiac monitoring; discussed but will hold off on anticoagulation for now   - Resp: Right chest tube to water seal, no air leak present, monitoring output, Albuterol inhaler QID  - GI: NG to LIS with q6hr  flushing 30cc, tube feeds at goal (60), bowel regimen, ensure NG tube working throughout the day  - : Monitoring urine output. Armas replaced 10/26 due to urinary retention, will remove tomorrow and trial off armas   - ID: Perioperative antibiotics completed, continuing to monitor fever curve  - Heme: Daily CBCs, trend hemoglobin  - Renal/FEN: No IVF  Ppx: Lovenox 40 mg q24h, Protonix  Drains/lines/tubes with settings : Chest tube x1 to water seal  - Lab may draw from the port  PT/OT/SW: PT OT  Dispo 6B      Daniel Oropeza MD  Surgery PGY3

## 2023-10-28 NOTE — PROGRESS NOTES
Cross-cover note: 1:41 PM 10/28/2023     10/28/2023    General Surgery Cross Cover Note    Called by nursing regarding triggering prolonged Qtc alarm on tele monitor multiple times     Patient feels fine. Has a sore throat, otherwise pain has been well managed. Denies chest pain/heaviness, SOB, nausea, vomiting.     B/P: 126/68, T: 97.4, P: 76, R: 16    PE:  A&O, NAD, sitting up in chair  NLB on RA  R chest tube - s/s output (105 ml over 16 hours)   NGT in place with bilious output   Abd soft, nondistended, nontender  Baca in place     Plan:  EKG ordered- NSR, Qtc 422   Continue to monitor     Jes Nelson, DO  General Surgery, PGY-1   [phone]

## 2023-10-28 NOTE — PLAN OF CARE
Neuro: A&Ox4.   Cardiac: Started Amio drip - currently SR w/ PVCs.  Respiratory: Sating * on RA.  GI/: Adequate urine output via Baca Cath due to retention. BM X1 with suppository.    Diet/appetite: NPO diet.  Continuous feedings via J tube    Activity:  Assist of 1 - Standby, up to commode 2x.  Pain: Pt report abdomen and throat pain.  On PCA pump.  Scheduled Oxy.  PRN Tylenol.    Skin: No new deficits noted.  LDA's:  Rt Chest tube to water seal.  Baca catheter.  R Port - running Amio.  Left PIV - running PCA pump.  Left NG tube to low continuous suction.      Plan: Continue with POC. Notify primary team with changes.

## 2023-10-29 ENCOUNTER — APPOINTMENT (OUTPATIENT)
Dept: PHYSICAL THERAPY | Facility: CLINIC | Age: 73
DRG: 327 | End: 2023-10-29
Attending: STUDENT IN AN ORGANIZED HEALTH CARE EDUCATION/TRAINING PROGRAM
Payer: COMMERCIAL

## 2023-10-29 ENCOUNTER — APPOINTMENT (OUTPATIENT)
Dept: GENERAL RADIOLOGY | Facility: CLINIC | Age: 73
DRG: 327 | End: 2023-10-29
Attending: STUDENT IN AN ORGANIZED HEALTH CARE EDUCATION/TRAINING PROGRAM
Payer: COMMERCIAL

## 2023-10-29 LAB
ANION GAP SERPL CALCULATED.3IONS-SCNC: 10 MMOL/L (ref 7–15)
BUN SERPL-MCNC: 28.6 MG/DL (ref 8–23)
CALCIUM SERPL-MCNC: 9 MG/DL (ref 8.8–10.2)
CHLORIDE SERPL-SCNC: 109 MMOL/L (ref 98–107)
CREAT SERPL-MCNC: 0.86 MG/DL (ref 0.67–1.17)
DEPRECATED HCO3 PLAS-SCNC: 25 MMOL/L (ref 22–29)
EGFRCR SERPLBLD CKD-EPI 2021: 90 ML/MIN/1.73M2
ERYTHROCYTE [DISTWIDTH] IN BLOOD BY AUTOMATED COUNT: 12.3 % (ref 10–15)
GLUCOSE SERPL-MCNC: 144 MG/DL (ref 70–99)
HCT VFR BLD AUTO: 31.4 % (ref 40–53)
HGB BLD-MCNC: 10.4 G/DL (ref 13.3–17.7)
MAGNESIUM SERPL-MCNC: 2.1 MG/DL (ref 1.7–2.3)
MCH RBC QN AUTO: 31.9 PG (ref 26.5–33)
MCHC RBC AUTO-ENTMCNC: 33.1 G/DL (ref 31.5–36.5)
MCV RBC AUTO: 96 FL (ref 78–100)
PHOSPHATE SERPL-MCNC: 3.8 MG/DL (ref 2.5–4.5)
PLATELET # BLD AUTO: 136 10E3/UL (ref 150–450)
POTASSIUM SERPL-SCNC: 4 MMOL/L (ref 3.4–5.3)
RBC # BLD AUTO: 3.26 10E6/UL (ref 4.4–5.9)
SODIUM SERPL-SCNC: 144 MMOL/L (ref 135–145)
WBC # BLD AUTO: 6.6 10E3/UL (ref 4–11)

## 2023-10-29 PROCEDURE — 250N000013 HC RX MED GY IP 250 OP 250 PS 637

## 2023-10-29 PROCEDURE — 250N000013 HC RX MED GY IP 250 OP 250 PS 637: Performed by: STUDENT IN AN ORGANIZED HEALTH CARE EDUCATION/TRAINING PROGRAM

## 2023-10-29 PROCEDURE — 120N000003 HC R&B IMCU UMMC

## 2023-10-29 PROCEDURE — 83735 ASSAY OF MAGNESIUM: CPT | Performed by: STUDENT IN AN ORGANIZED HEALTH CARE EDUCATION/TRAINING PROGRAM

## 2023-10-29 PROCEDURE — 250N000011 HC RX IP 250 OP 636: Mod: JZ | Performed by: STUDENT IN AN ORGANIZED HEALTH CARE EDUCATION/TRAINING PROGRAM

## 2023-10-29 PROCEDURE — 84100 ASSAY OF PHOSPHORUS: CPT | Performed by: STUDENT IN AN ORGANIZED HEALTH CARE EDUCATION/TRAINING PROGRAM

## 2023-10-29 PROCEDURE — 97530 THERAPEUTIC ACTIVITIES: CPT | Mod: GP

## 2023-10-29 PROCEDURE — 80048 BASIC METABOLIC PNL TOTAL CA: CPT | Performed by: STUDENT IN AN ORGANIZED HEALTH CARE EDUCATION/TRAINING PROGRAM

## 2023-10-29 PROCEDURE — 999N000128 HC STATISTIC PERIPHERAL IV START W/O US GUIDANCE

## 2023-10-29 PROCEDURE — 250N000009 HC RX 250

## 2023-10-29 PROCEDURE — 85027 COMPLETE CBC AUTOMATED: CPT | Performed by: STUDENT IN AN ORGANIZED HEALTH CARE EDUCATION/TRAINING PROGRAM

## 2023-10-29 PROCEDURE — 71045 X-RAY EXAM CHEST 1 VIEW: CPT

## 2023-10-29 PROCEDURE — 97116 GAIT TRAINING THERAPY: CPT | Mod: GP

## 2023-10-29 PROCEDURE — 36415 COLL VENOUS BLD VENIPUNCTURE: CPT | Performed by: STUDENT IN AN ORGANIZED HEALTH CARE EDUCATION/TRAINING PROGRAM

## 2023-10-29 PROCEDURE — 71045 X-RAY EXAM CHEST 1 VIEW: CPT | Mod: 26 | Performed by: STUDENT IN AN ORGANIZED HEALTH CARE EDUCATION/TRAINING PROGRAM

## 2023-10-29 RX ORDER — ACETAMINOPHEN 325 MG/1
650 TABLET ORAL EVERY 4 HOURS
Status: DISCONTINUED | OUTPATIENT
Start: 2023-10-29 | End: 2023-11-01

## 2023-10-29 RX ORDER — AMIODARONE HYDROCHLORIDE 200 MG/1
200 TABLET ORAL DAILY
Qty: 21 TABLET | Refills: 0 | Status: DISCONTINUED | OUTPATIENT
Start: 2023-11-04 | End: 2023-11-01 | Stop reason: HOSPADM

## 2023-10-29 RX ORDER — AMIODARONE HYDROCHLORIDE 200 MG/1
400 TABLET ORAL 2 TIMES DAILY
Qty: 16 TABLET | Refills: 0 | Status: DISCONTINUED | OUTPATIENT
Start: 2023-10-29 | End: 2023-11-01 | Stop reason: HOSPADM

## 2023-10-29 RX ORDER — AMIODARONE HYDROCHLORIDE 200 MG/1
200 TABLET ORAL 2 TIMES DAILY
Qty: 4 TABLET | Refills: 0 | Status: DISCONTINUED | OUTPATIENT
Start: 2023-11-02 | End: 2023-11-01 | Stop reason: HOSPADM

## 2023-10-29 RX ADMIN — ACETAMINOPHEN 650 MG: 325 TABLET, FILM COATED ORAL at 11:33

## 2023-10-29 RX ADMIN — AMIODARONE HYDROCHLORIDE 400 MG: 200 TABLET ORAL at 20:53

## 2023-10-29 RX ADMIN — Medication 5 MG: at 20:53

## 2023-10-29 RX ADMIN — POLYETHYLENE GLYCOL 3350 17 G: 17 POWDER, FOR SOLUTION ORAL at 08:07

## 2023-10-29 RX ADMIN — Medication 40 MG: at 08:07

## 2023-10-29 RX ADMIN — PHENOL 1 ML: 1.5 LIQUID ORAL at 12:40

## 2023-10-29 RX ADMIN — PHENOL 1 ML: 1.5 LIQUID ORAL at 15:03

## 2023-10-29 RX ADMIN — PHENOL 1 ML: 1.5 LIQUID ORAL at 03:23

## 2023-10-29 RX ADMIN — PHENOL 1 ML: 1.5 LIQUID ORAL at 18:21

## 2023-10-29 RX ADMIN — OXYCODONE HYDROCHLORIDE 10 MG: 5 SOLUTION ORAL at 01:05

## 2023-10-29 RX ADMIN — PHENOL 1 ML: 1.5 LIQUID ORAL at 08:40

## 2023-10-29 RX ADMIN — IBUPROFEN 400 MG: 200 SUSPENSION ORAL at 22:36

## 2023-10-29 RX ADMIN — PHENOL 1 ML: 1.5 LIQUID ORAL at 11:26

## 2023-10-29 RX ADMIN — IBUPROFEN 400 MG: 200 SUSPENSION ORAL at 16:37

## 2023-10-29 RX ADMIN — PHENOL 1 ML: 1.5 LIQUID ORAL at 21:19

## 2023-10-29 RX ADMIN — OXYCODONE HYDROCHLORIDE 10 MG: 5 SOLUTION ORAL at 18:18

## 2023-10-29 RX ADMIN — OXYCODONE HYDROCHLORIDE 10 MG: 5 SOLUTION ORAL at 11:25

## 2023-10-29 RX ADMIN — PHENOL 1 ML: 1.5 LIQUID ORAL at 16:45

## 2023-10-29 RX ADMIN — ALBUTEROL SULFATE 4 PUFF: 90 AEROSOL, METERED RESPIRATORY (INHALATION) at 16:37

## 2023-10-29 RX ADMIN — Medication 15 ML: at 11:26

## 2023-10-29 RX ADMIN — SENNOSIDES AND DOCUSATE SODIUM 2 TABLET: 8.6; 5 TABLET ORAL at 08:07

## 2023-10-29 RX ADMIN — LIDOCAINE PATCH 4% 1 PATCH: 40 PATCH TOPICAL at 08:07

## 2023-10-29 RX ADMIN — ALBUTEROL SULFATE 4 PUFF: 90 AEROSOL, METERED RESPIRATORY (INHALATION) at 08:06

## 2023-10-29 RX ADMIN — IBUPROFEN 400 MG: 200 SUSPENSION ORAL at 04:51

## 2023-10-29 RX ADMIN — ACETAMINOPHEN 650 MG: 325 TABLET, FILM COATED ORAL at 08:40

## 2023-10-29 RX ADMIN — PHENOL 1 ML: 1.5 LIQUID ORAL at 09:59

## 2023-10-29 RX ADMIN — GABAPENTIN 100 MG: 100 CAPSULE ORAL at 22:36

## 2023-10-29 RX ADMIN — ACETAMINOPHEN 650 MG: 325 TABLET, FILM COATED ORAL at 16:37

## 2023-10-29 RX ADMIN — PHENOL 1 ML: 1.5 LIQUID ORAL at 22:49

## 2023-10-29 RX ADMIN — PHENOL 1 ML: 1.5 LIQUID ORAL at 06:45

## 2023-10-29 RX ADMIN — ALBUTEROL SULFATE 4 PUFF: 90 AEROSOL, METERED RESPIRATORY (INHALATION) at 20:54

## 2023-10-29 RX ADMIN — IBUPROFEN 400 MG: 200 SUSPENSION ORAL at 11:26

## 2023-10-29 RX ADMIN — ACETAMINOPHEN 650 MG: 325 TABLET, FILM COATED ORAL at 20:53

## 2023-10-29 RX ADMIN — AMIODARONE HYDROCHLORIDE 400 MG: 200 TABLET ORAL at 11:26

## 2023-10-29 RX ADMIN — PHENOL 1 ML: 1.5 LIQUID ORAL at 04:52

## 2023-10-29 RX ADMIN — DOXAZOSIN 1 MG: 4 TABLET ORAL at 08:07

## 2023-10-29 RX ADMIN — ENOXAPARIN SODIUM 40 MG: 40 INJECTION SUBCUTANEOUS at 16:37

## 2023-10-29 RX ADMIN — OXYCODONE HYDROCHLORIDE 10 MG: 5 SOLUTION ORAL at 06:41

## 2023-10-29 RX ADMIN — ALBUTEROL SULFATE 4 PUFF: 90 AEROSOL, METERED RESPIRATORY (INHALATION) at 12:40

## 2023-10-29 RX ADMIN — SENNOSIDES AND DOCUSATE SODIUM 2 TABLET: 8.6; 5 TABLET ORAL at 20:52

## 2023-10-29 ASSESSMENT — ACTIVITIES OF DAILY LIVING (ADL)
ADLS_ACUITY_SCORE: 28

## 2023-10-29 NOTE — PROGRESS NOTES
THORACIC & FOREGUT SURGERY    S:  No acute overnight events. Feeling well, pain controlled    O:  Vitals:    10/29/23 0200 10/29/23 0400 10/29/23 0600 10/29/23 0751   BP: 119/64 94/70 136/65 (!) 149/79   BP Location: Left arm Left arm  Left arm   Cuff Size: Adult Regular Adult Regular Adult Regular Adult Regular   Pulse: 60 70 66 69   Resp:    16   Temp:    98  F (36.7  C)   TempSrc:    Oral   SpO2: 95% 96% 95% 95%   Weight:       Height:         Gen: A&Ox3, NAD, resting comfortably in bed  HEENT: NCAT, NG to LIS  Resp: Non-labored breathing on room air, right chest tube to water seal without air leak  CV: Normal sinus rhythm, hemodynamically stable  Abd: Soft, non-distended, non-tender, incisions clean dry and intact with minimal strikethrough  Ext: Distal extremities warm, no peripheral edema, moving all extremities     A/P: Alonso Copeland is a 74 y/o M with a PMHx of HLD, anemia, neuropathy, sensorineural hearing loss thyroid cancer and prostate cancer hx now s/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 King. Patient admitted to SICU post-operatively for hemodynamic monitoring. Doing well post-operatively, pain controlled on current regimen, hemodynamically stable, pending timing for esophagram/ video swallow study, likely Monday.  Patient did have recurrent A-fib with RVR and needed another bolus as well as converted back to IV amiodarone on 10/27/2023, converted to orals today. Plan for esophagram tomorrow       - Pain/Neuro: Schedule tylenol and motrin, Scheduled Oxy 10 mg per J tube q6h, Tylenol q8h Scheduled, Gabapentin 100 mg at bedtime scheduled, Robaxin 500 mg q6h PRN, Lidocaine patch q24h scheduled  - CV: Convert for oral amio, continue cardiac monitoring; discussed but will hold off on anticoagulation for now   - Resp: Right chest tube to water seal, no air leak present, monitoring output, Albuterol inhaler QID  - GI: NG to LIS with q6hr flushing  30cc, tube feeds at goal (60), bowel regimen, ensure NG tube working throughout the day   - Esophagram, video swallow study, SLP consult placed for tomorrow   - : Monitoring urine output.    # urinary retention, s/p armas for 3 days, Will remove armas again today   - ID: Perioperative antibiotics completed, continuing to monitor fever curve  - Heme: Daily CBCs, trend hemoglobin  - Renal/FEN: No IVF  Ppx: Lovenox 40 mg q24h, Protonix  Drains/lines/tubes with settings : Chest tube x1 to water seal  - Lab may draw from the port  PT/OT/SW: PT OT  Dispo 6B      Daniel Oropeza MD  Surgery PGY3

## 2023-10-29 NOTE — PLAN OF CARE
Notified provider about indwelling armas catheter discussed removal or continued need.    Did provider choose to remove indwelling armas catheter? NO    Provider's armas indication for keeping indwelling armas catheter: Indication for continued use: Retention    Is there an order for indwelling armas catheter? YES

## 2023-10-29 NOTE — PLAN OF CARE
Neuro: A&Ox4. Patient awake most of day, no problems identified.  Cardiac: Patient SR, HR 70s-80s. Frequent PVCs. VSS.  Respiratory: Sating >95% on RA.  GI/: Adequate urine output - armas care and CAUTI note completed. LBM 10/28 - bowel regimen continued.  Diet/appetite: Pt NPO. Tube feeds 60mL/hr with q4hr 30mL FWF  Activity:  Ax1 standing at bedside and in chair and hallway.   Pain: At acceptable level on current regimen. PRN tylenol x2  Skin: No new deficits noted.   LDA's: R chest tube to water seal, NG to low continuous suction with q6 FWF 30mL, PEG tube running TF at 60mL/hr with q4hr 30mL FWF.  Port-a-cath running Amiodarone at 0.5mg/min.     Plan: Continue with POC. Notify primary team with changes.         Goal Outcome Evaluation:    Problem: Plan of Care - These are the overarching goals to be used throughout the patient stay.    Goal: Plan of Care Review  Description: The Plan of Care Review/Shift note should be completed every shift.  The Outcome Evaluation is a brief statement about your assessment that the patient is improving, declining, or no change.  This information will be displayed automatically on your shift note.  Outcome: Progressing

## 2023-10-29 NOTE — PLAN OF CARE
Neuro: A&Ox4. Patient awake most of day, no problems identified. Anxiety reported.  Cardiac: Patient SR, HR 60s-80s. Frequent PVCs. VSS.  Respiratory: Sating >95% on RA. No desat with activity  GI/: Baca removed, already voided since removal - adequate output. LBM 10/29 - bowel regimen continued.  Diet/appetite: Pt NPO. Tube feeds 60mL/hr with q4hr 30mL FWF  Activity:  Ax1 standing at bedside, in chair, and hallway.   Pain: At acceptable level on current regimen.  Skin: No new deficits noted.   LDA's: R chest tube to water seal, NG to low continuous suction with q6 FWF 30mL, PEG tube running TF at 60mL/hr with q4hr 30mL FWF.  Port-a-cath SL.     Plan: Esophagram tomorrow - possible discontinue of NG. Continue with POC. Notify primary team with changes.    Problem: Plan of Care - These are the overarching goals to be used throughout the patient stay.    Goal: Optimal Comfort and Wellbeing  Intervention: Monitor Pain and Promote Comfort  Recent Flowsheet Documentation  Taken 10/29/2023 1637 by Alyssa Maxwell, RN  Pain Management Interventions: medication (see MAR)  Taken 10/29/2023 1213 by Alyssa Maxwell, RN  Pain Management Interventions:   rest   repositioned   relaxation techniques promoted   quiet environment facilitated   pillow support provided  Taken 10/29/2023 1133 by Alyssa Maxwell, RN  Pain Management Interventions: medication (see MAR)  Taken 10/29/2023 0930 by Alyssa Maxwell, RN  Pain Management Interventions:   rest   repositioned   pillow support provided  Taken 10/29/2023 0840 by Alyssa Maxwell, RN  Pain Management Interventions: medication (see MAR)   Goal Outcome Evaluation:

## 2023-10-29 NOTE — PLAN OF CARE
Neuro: A&Ox4. Calls appropriately.  Cardiac: SR. BP 120s-130s.  Amio drip running.  Q2 VS  Respiratory: Sating >95 on RA.  GI/: Adequate urine output via Baca Cath due to retention. BM X1 with suppository.    Diet/appetite: NPO diet.  Continuous feedings via J tube.  Activity:  Standby assist, up to commode 3x.  Pain: Pt report abdomen, throat pain.  Pt report right Chest tube site sore.  Scheduled Oxy and Ibuprofen.  PRN Tylenol and Phenol spray.  Skin: No new deficits noted.  LDA's:  Rt Chest tube to water seal.  Baca catheter.  R Port - running Amio.  Left PIV - SL.  Left NG tube to low continuous suction.       Plan: Continue with POC. Notify primary team with changes.

## 2023-10-30 ENCOUNTER — APPOINTMENT (OUTPATIENT)
Dept: GENERAL RADIOLOGY | Facility: CLINIC | Age: 73
DRG: 327 | End: 2023-10-30
Attending: STUDENT IN AN ORGANIZED HEALTH CARE EDUCATION/TRAINING PROGRAM
Payer: COMMERCIAL

## 2023-10-30 ENCOUNTER — APPOINTMENT (OUTPATIENT)
Dept: OCCUPATIONAL THERAPY | Facility: CLINIC | Age: 73
DRG: 327 | End: 2023-10-30
Attending: STUDENT IN AN ORGANIZED HEALTH CARE EDUCATION/TRAINING PROGRAM
Payer: COMMERCIAL

## 2023-10-30 ENCOUNTER — APPOINTMENT (OUTPATIENT)
Dept: EDUCATION SERVICES | Facility: CLINIC | Age: 73
DRG: 327 | End: 2023-10-30
Attending: STUDENT IN AN ORGANIZED HEALTH CARE EDUCATION/TRAINING PROGRAM
Payer: COMMERCIAL

## 2023-10-30 ENCOUNTER — APPOINTMENT (OUTPATIENT)
Dept: PHYSICAL THERAPY | Facility: CLINIC | Age: 73
DRG: 327 | End: 2023-10-30
Attending: STUDENT IN AN ORGANIZED HEALTH CARE EDUCATION/TRAINING PROGRAM
Payer: COMMERCIAL

## 2023-10-30 ENCOUNTER — APPOINTMENT (OUTPATIENT)
Dept: SPEECH THERAPY | Facility: CLINIC | Age: 73
DRG: 327 | End: 2023-10-30
Attending: STUDENT IN AN ORGANIZED HEALTH CARE EDUCATION/TRAINING PROGRAM
Payer: COMMERCIAL

## 2023-10-30 LAB
ANION GAP SERPL CALCULATED.3IONS-SCNC: 13 MMOL/L (ref 7–15)
ATRIAL RATE - MUSE: 67 BPM
BUN SERPL-MCNC: 30 MG/DL (ref 8–23)
CALCIUM SERPL-MCNC: 9 MG/DL (ref 8.8–10.2)
CHLORIDE SERPL-SCNC: 109 MMOL/L (ref 98–107)
CREAT SERPL-MCNC: 0.94 MG/DL (ref 0.67–1.17)
DEPRECATED HCO3 PLAS-SCNC: 23 MMOL/L (ref 22–29)
DIASTOLIC BLOOD PRESSURE - MUSE: NORMAL MMHG
EGFRCR SERPLBLD CKD-EPI 2021: 85 ML/MIN/1.73M2
ERYTHROCYTE [DISTWIDTH] IN BLOOD BY AUTOMATED COUNT: 12.2 % (ref 10–15)
GLUCOSE SERPL-MCNC: 127 MG/DL (ref 70–99)
HCT VFR BLD AUTO: 35.9 % (ref 40–53)
HGB BLD-MCNC: 11.4 G/DL (ref 13.3–17.7)
INTERPRETATION ECG - MUSE: NORMAL
MAGNESIUM SERPL-MCNC: 2.3 MG/DL (ref 1.7–2.3)
MCH RBC QN AUTO: 31.8 PG (ref 26.5–33)
MCHC RBC AUTO-ENTMCNC: 31.8 G/DL (ref 31.5–36.5)
MCV RBC AUTO: 100 FL (ref 78–100)
P AXIS - MUSE: 35 DEGREES
PHOSPHATE SERPL-MCNC: 4.4 MG/DL (ref 2.5–4.5)
PLATELET # BLD AUTO: 157 10E3/UL (ref 150–450)
POTASSIUM SERPL-SCNC: 3.9 MMOL/L (ref 3.4–5.3)
PR INTERVAL - MUSE: 162 MS
QRS DURATION - MUSE: 90 MS
QT - MUSE: 400 MS
QTC - MUSE: 422 MS
R AXIS - MUSE: -15 DEGREES
RBC # BLD AUTO: 3.58 10E6/UL (ref 4.4–5.9)
SODIUM SERPL-SCNC: 145 MMOL/L (ref 135–145)
SYSTOLIC BLOOD PRESSURE - MUSE: NORMAL MMHG
T AXIS - MUSE: 0 DEGREES
VENTRICULAR RATE- MUSE: 67 BPM
WBC # BLD AUTO: 7.2 10E3/UL (ref 4–11)

## 2023-10-30 PROCEDURE — 83735 ASSAY OF MAGNESIUM: CPT | Performed by: STUDENT IN AN ORGANIZED HEALTH CARE EDUCATION/TRAINING PROGRAM

## 2023-10-30 PROCEDURE — 92526 ORAL FUNCTION THERAPY: CPT | Mod: GN | Performed by: SPEECH-LANGUAGE PATHOLOGIST

## 2023-10-30 PROCEDURE — 120N000003 HC R&B IMCU UMMC

## 2023-10-30 PROCEDURE — 71045 X-RAY EXAM CHEST 1 VIEW: CPT | Mod: 26 | Performed by: RADIOLOGY

## 2023-10-30 PROCEDURE — 85027 COMPLETE CBC AUTOMATED: CPT | Performed by: STUDENT IN AN ORGANIZED HEALTH CARE EDUCATION/TRAINING PROGRAM

## 2023-10-30 PROCEDURE — 97116 GAIT TRAINING THERAPY: CPT | Mod: GP

## 2023-10-30 PROCEDURE — 74230 X-RAY XM SWLNG FUNCJ C+: CPT

## 2023-10-30 PROCEDURE — 250N000011 HC RX IP 250 OP 636: Mod: JZ | Performed by: STUDENT IN AN ORGANIZED HEALTH CARE EDUCATION/TRAINING PROGRAM

## 2023-10-30 PROCEDURE — 80048 BASIC METABOLIC PNL TOTAL CA: CPT | Performed by: STUDENT IN AN ORGANIZED HEALTH CARE EDUCATION/TRAINING PROGRAM

## 2023-10-30 PROCEDURE — 36415 COLL VENOUS BLD VENIPUNCTURE: CPT | Performed by: STUDENT IN AN ORGANIZED HEALTH CARE EDUCATION/TRAINING PROGRAM

## 2023-10-30 PROCEDURE — 97535 SELF CARE MNGMENT TRAINING: CPT | Mod: GO | Performed by: OCCUPATIONAL THERAPIST

## 2023-10-30 PROCEDURE — 250N000013 HC RX MED GY IP 250 OP 250 PS 637

## 2023-10-30 PROCEDURE — 74220 X-RAY XM ESOPHAGUS 1CNTRST: CPT

## 2023-10-30 PROCEDURE — 250N000013 HC RX MED GY IP 250 OP 250 PS 637: Performed by: STUDENT IN AN ORGANIZED HEALTH CARE EDUCATION/TRAINING PROGRAM

## 2023-10-30 PROCEDURE — 99207 XR ESOPHAGRAM: CPT | Mod: 26 | Performed by: RADIOLOGY

## 2023-10-30 PROCEDURE — 250N000009 HC RX 250

## 2023-10-30 PROCEDURE — 71045 X-RAY EXAM CHEST 1 VIEW: CPT

## 2023-10-30 PROCEDURE — 92611 MOTION FLUOROSCOPY/SWALLOW: CPT | Mod: GN | Performed by: SPEECH-LANGUAGE PATHOLOGIST

## 2023-10-30 PROCEDURE — 74230 X-RAY XM SWLNG FUNCJ C+: CPT | Mod: 26 | Performed by: RADIOLOGY

## 2023-10-30 PROCEDURE — 84100 ASSAY OF PHOSPHORUS: CPT | Performed by: STUDENT IN AN ORGANIZED HEALTH CARE EDUCATION/TRAINING PROGRAM

## 2023-10-30 PROCEDURE — 97530 THERAPEUTIC ACTIVITIES: CPT | Mod: GP

## 2023-10-30 RX ADMIN — POLYETHYLENE GLYCOL 3350 17 G: 17 POWDER, FOR SOLUTION ORAL at 08:15

## 2023-10-30 RX ADMIN — OXYCODONE HYDROCHLORIDE 10 MG: 5 SOLUTION ORAL at 06:19

## 2023-10-30 RX ADMIN — IBUPROFEN 400 MG: 200 SUSPENSION ORAL at 23:13

## 2023-10-30 RX ADMIN — GABAPENTIN 100 MG: 100 CAPSULE ORAL at 22:58

## 2023-10-30 RX ADMIN — ACETAMINOPHEN 650 MG: 325 TABLET, FILM COATED ORAL at 16:28

## 2023-10-30 RX ADMIN — IBUPROFEN 400 MG: 200 SUSPENSION ORAL at 16:28

## 2023-10-30 RX ADMIN — AMIODARONE HYDROCHLORIDE 400 MG: 200 TABLET ORAL at 08:14

## 2023-10-30 RX ADMIN — Medication 15 ML: at 12:30

## 2023-10-30 RX ADMIN — ALBUTEROL SULFATE 4 PUFF: 90 AEROSOL, METERED RESPIRATORY (INHALATION) at 16:47

## 2023-10-30 RX ADMIN — OXYCODONE HYDROCHLORIDE 10 MG: 5 SOLUTION ORAL at 12:30

## 2023-10-30 RX ADMIN — PHENOL 1 ML: 1.5 LIQUID ORAL at 04:01

## 2023-10-30 RX ADMIN — SENNOSIDES AND DOCUSATE SODIUM 1 TABLET: 8.6; 5 TABLET ORAL at 08:12

## 2023-10-30 RX ADMIN — OXYCODONE HYDROCHLORIDE 10 MG: 5 SOLUTION ORAL at 00:04

## 2023-10-30 RX ADMIN — ACETAMINOPHEN 650 MG: 325 TABLET, FILM COATED ORAL at 20:58

## 2023-10-30 RX ADMIN — SENNOSIDES AND DOCUSATE SODIUM 1 TABLET: 8.6; 5 TABLET ORAL at 20:58

## 2023-10-30 RX ADMIN — PHENOL 1 ML: 1.5 LIQUID ORAL at 12:30

## 2023-10-30 RX ADMIN — PHENOL 1 ML: 1.5 LIQUID ORAL at 10:22

## 2023-10-30 RX ADMIN — AMIODARONE HYDROCHLORIDE 400 MG: 200 TABLET ORAL at 21:10

## 2023-10-30 RX ADMIN — PHENOL 1 ML: 1.5 LIQUID ORAL at 06:20

## 2023-10-30 RX ADMIN — IBUPROFEN 400 MG: 200 SUSPENSION ORAL at 04:00

## 2023-10-30 RX ADMIN — ACETAMINOPHEN 650 MG: 325 TABLET, FILM COATED ORAL at 12:30

## 2023-10-30 RX ADMIN — ALBUTEROL SULFATE 4 PUFF: 90 AEROSOL, METERED RESPIRATORY (INHALATION) at 22:58

## 2023-10-30 RX ADMIN — Medication 40 MG: at 08:09

## 2023-10-30 RX ADMIN — ACETAMINOPHEN 650 MG: 325 TABLET, FILM COATED ORAL at 00:04

## 2023-10-30 RX ADMIN — LIDOCAINE PATCH 4% 1 PATCH: 40 PATCH TOPICAL at 08:14

## 2023-10-30 RX ADMIN — ALBUTEROL SULFATE 4 PUFF: 90 AEROSOL, METERED RESPIRATORY (INHALATION) at 12:52

## 2023-10-30 RX ADMIN — ENOXAPARIN SODIUM 40 MG: 40 INJECTION SUBCUTANEOUS at 15:40

## 2023-10-30 RX ADMIN — IBUPROFEN 400 MG: 200 SUSPENSION ORAL at 10:22

## 2023-10-30 RX ADMIN — DOXAZOSIN 1 MG: 4 TABLET ORAL at 08:09

## 2023-10-30 RX ADMIN — PHENOL 1 ML: 1.5 LIQUID ORAL at 00:12

## 2023-10-30 RX ADMIN — ALBUTEROL SULFATE 4 PUFF: 90 AEROSOL, METERED RESPIRATORY (INHALATION) at 08:15

## 2023-10-30 RX ADMIN — PHENOL 1 ML: 1.5 LIQUID ORAL at 22:59

## 2023-10-30 RX ADMIN — ACETAMINOPHEN 650 MG: 325 TABLET, FILM COATED ORAL at 04:00

## 2023-10-30 RX ADMIN — OXYCODONE HYDROCHLORIDE 10 MG: 5 SOLUTION ORAL at 18:11

## 2023-10-30 RX ADMIN — PHENOL 1 ML: 1.5 LIQUID ORAL at 21:22

## 2023-10-30 RX ADMIN — ACETAMINOPHEN 650 MG: 325 TABLET, FILM COATED ORAL at 08:11

## 2023-10-30 RX ADMIN — PHENOL 1 ML: 1.5 LIQUID ORAL at 05:06

## 2023-10-30 RX ADMIN — PHENOL 1 ML: 1.5 LIQUID ORAL at 15:41

## 2023-10-30 RX ADMIN — PHENOL 1 ML: 1.5 LIQUID ORAL at 08:13

## 2023-10-30 ASSESSMENT — ACTIVITIES OF DAILY LIVING (ADL)
ADLS_ACUITY_SCORE: 28

## 2023-10-30 NOTE — PLAN OF CARE
Neuro: A&Ox4. Calls appropriately.  Cardiac: SR occasional PVC. BP 120s-130s.    Respiratory: Sating >95 on RA.  GI/: After armas out this AM, Pt had adequate urine output and 3 soft BM.  Diet/appetite: NPO diet.  Continuous feedings via J tube.  Activity:  Standby assist, up to commode.  Pain: Pt report more throat pain-sore and some Rt chest site pain.  Scheduled Oxy, Tylenol, Ibuprofen.  PRN Phenol spray for throat.  Skin: No new deficits noted.  LDA's:  Rt Chest tube to water seal.  R Port-SL.  Rt PIV-SL.  Left NG tube to low continuous suction.       Plan: Continue with POC. Notify primary team with changes

## 2023-10-30 NOTE — PROGRESS NOTES
"   10/30/23 1101   Appointment Info   Signing Clinician's Name / Credentials (SLP) Soren Haney MA [facility] SLP   General Information   Onset of Illness/Injury or Date of Surgery 10/23/23   Referring Physician Daniel Oropeza MD   Pertinent History of Current Problem Per Provider notes: \"Alonso Copeland is a 74 y/o M with a PMHx of HLD, anemia, neuropathy, sensorineural hearing loss thyroid cancer and prostate cancer hx now s/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 King. Patient admitted to SICU post-operatively for hemodynamic monitoring. Doing well post-operatively, pain controlled on current regimen, hemodynamically stable, pending timing for esophagram/ video swallow study, likely Monday.  Patient did have recurrent A-fib with RVR and needed another bolus as well as converted back to IV amiodarone on 10/27/2023, converted to oral amio taper 10/29.\" VFSS this AM per provider request prior to esophagram.   General Observations Patient upright in chair, cooperative and pleasant. Followed directions appropriately.   Type of Evaluation   Type of Evaluation Swallow Evaluation   General Swallowing Observations   Past History of Dysphagia Difficulty swallowing in April 2023 with epigastric pain. No notes of SLP services prior to esophagectomy.   Respiratory Support room air   Current Diet/Method of Nutritional Intake (General Swallowing Observations, NIS) NPO   Swallowing Evaluation Videofluoroscopic swallow study (VFSS)   VFSS Evaluation   Radiologist Resident   Views Taken left lateral   Physical Location of Procedure [facility] Radiology Dept.   VFSS Textures Trialed thin liquids  (Omnipaque)   VFSS Eval: Thin Liquid Texture Trial   Mode of Presentation, Thin Liquid cup;straw;self-fed   Order of Presentation 1-5   Preparatory Phase WFL   Oral Phase, Thin Liquid WFL   Bolus Location When Swallow Triggered posterior laryngeal surface of epiglottis "   Pharyngeal Phase, Thin Liquid impaired hyolaryngeal excursion;impaired tongue base retraction;residue in vallecula;residue in pyriform sinus   Rosenbek's Penetration Aspiration Scale: Thin Liquid Trial Results 3 - contrast remains above the vocal cords, visible residue remains (penetration)   Strategies and Compensations supraglottic swallow strategy   Diagnostic Statement Consistent trace amount of penetrated material during and after the swallow, deep, but did not contact VF. Cough was adequate to clear penetrated material. Smaller sips lead to decreased penetration amount. Chin tuck did not prevent penetration.   Esophageal Phase of Swallow   Esophageal comments Refer to Rad report for esophagram.   Swallowing Recommendations   Diet Consistency Recommendations clear liquid diet;thin liquids (level 0)   Mode of Delivery Recommendations bolus size, small;slow rate of intake   Swallowing Maneuver Recommendations supraglottic swallow   Monitoring/Assistance Required (Eating/Swallowing) stop eating activities when fatigue is present   Recommended Feeding/Eating Techniques (Swallow Eval) maintain upright posture during/after eating for 30 minutes;minimize distractions during oral intake;provide oral hygiene prior to intake   Medication Administration Recommendations, Swallowing (SLP) Defer to provider team recommendation   Instrumental Assessment Recommendations   (Pending progress)   General Therapy Interventions   Planned Therapy Interventions Dysphagia Treatment   Dysphagia treatment Oropharyngeal exercise training;Instruction of safe swallow strategies;Compensatory strategies for swallowing   Clinical Impression   Criteria for Skilled Therapeutic Interventions Met (SLP Eval) Yes, treatment indicated   SLP Diagnosis Mild pharyngeal dysphagia   Risks & Benefits of therapy have been explained evaluation/treatment results reviewed;care plan/treatment goals reviewed;risks/benefits reviewed;current/potential barriers  reviewed;participants voiced agreement with care plan;participants included;patient   Clinical Impression Comments   VFSS completed this AM per provider orders. Patient presents with mild pharyngeal dysphagia in setting of esophagectomy. Water-soluable contrast, Omnipaque, used for VFSS today. Only trials of thin liquid completed per provider orders. Patient oral phase c/b adequate bolus acceptance and closure, functional A-P movement, and good oral clearance. Pharyngeal phase c/b initiation of pharyngeal swallow at valleculae or underside of epiglottis, mildly reduced hyolaryngeal excursion, reduced BOT retraction, and suspected generalized pharyngeal weakness resulting in consistent trace amount penetrated material during and after the swallow not to the level of the vocal folds, and adequate volitional cough function to eject penetrated material, as well as mild amount of vallecular residue and trace amount pyriform residue. Suspect overall pharyngeal weakness given medical status and lack of PO intake for >1 week. Chin tuck not effective in preventing penetration.     Recommend thin liquids - clear liquids diet, however defer to provider team to initiate PO diet. When taking PO, patient should take small bites/sips, use a slow rate of intake, and use the cough swallow cough strategy (supraglottic swallow). SLP will follow for dysphagia management.     SLP Total Evaluation Time   Evaluation, videofluoroscopic eval of swallow function Minutes (36239) 10   SLP Discharge Planning   SLP Discharge Recommendation home with assist   SLP Rationale for DC Rec Below baseline swallow function, however suspect from a swallowing perspective patient will be able to advance to close to baseline diet barring any complications from esophagectomy.

## 2023-10-30 NOTE — PROGRESS NOTES
Home Infusion  Received referral from  RNCC for Enteral feedings.  Benefits verified.  Patient has BCBS Medicare and is covered 80/20 until meets OOP of $3000 (has met $2414.33), then should be covered 100% once meets OOP. Provider needs to document the JEIMY of tube feeding, for coverage.  Met with pt and his wife at bedside to review home infusion services, review benefits and offer choice of providers.  Patient would like to use [facility] for home infusion.  Confirmed discharge address, phone, and emergency contact information.  Pt is expected to discharge as soon as end of the week, per pt,  and will be going home on enteral feeds via pump.  Pt has never done home enteral therapy before however he and his wife have had some initial teaching by [facility] nurse.    Provided pt  with information about [facility] services.  Explained about administration method via the Infinity pump with back pack for mobility. Discussed options of hook up of tube feedings at the hospital prior to discharge vs disconnect for transport home (if feasible per medical team) and restart of feeding at home.   Informed them about supplies and delivery of supplies, storage of formula, plan for SNV and 24/7 availability of [facility] staff while on enteral therapy.    PT verbalized understanding of all information given.  Pt and his wife are willing and able to learn and manage home enteral therapy.  Patient expressed a desire to have the tube feedings hooked up at the hospital or off for transport and a nurse to the home to assist with setting it back up if ok with medical team.   Questions answered.  Will continue to follow and revisit pt once discharge plans are confirmed.    Thank you for the referral      Milvia Garcia LPN  Enteral Nurse Liaison  [facility]  [address] [place] [id number]  [phone]  Main number [phone]

## 2023-10-30 NOTE — PROGRESS NOTES
THORACIC & FOREGUT SURGERY    S:  No acute overnight events. Pt seen at bedside resting comfortably. Mainly complaining of sore throat pain. Voiding since armas removed yesterday.    O:  Vitals:    10/29/23 2319 10/30/23 0358 10/30/23 0455 10/30/23 0759   BP: 126/71 136/74  (!) 143/62   BP Location: Left arm Left leg  Left arm   Cuff Size: Adult Regular Adult Regular     Pulse: 70 72  85   Resp: 18   18   Temp: 97.5  F (36.4  C) 97.7  F (36.5  C)  97.7  F (36.5  C)   TempSrc: Oral Oral  Oral   SpO2: 91% 97%  92%   Weight:   103.7 kg (228 lb 9.9 oz)    Height:         Gen: A&Ox3, NAD, resting comfortably in bed  HEENT: NCAT, NG to LIS  Resp: Non-labored breathing on room air, right chest tube to water seal without air leak  CV: Normal sinus rhythm, hemodynamically stable  Abd: Soft, non-distended, non-tender, incisions clean dry and intact with minimal strikethrough  Ext: Distal extremities warm, no peripheral edema, moving all extremities     A/P: Alonso Copeland is a 74 y/o M with a PMHx of HLD, anemia, neuropathy, sensorineural hearing loss thyroid cancer and prostate cancer hx now s/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 King. Patient admitted to SICU post-operatively for hemodynamic monitoring. Doing well post-operatively, pain controlled on current regimen, hemodynamically stable, pending timing for esophagram/ video swallow study, likely Monday.  Patient did have recurrent A-fib with RVR and needed another bolus as well as converted back to IV amiodarone on 10/27/2023, converted to oral amio taper 10/29. Plan for esophagram, video swallow, speech eval today.       - Pain/Neuro: Schedule tylenol and motrin, Scheduled Oxy 10 mg per J tube q6h, Tylenol q8h Scheduled, Gabapentin 100 mg at bedtime scheduled, Robaxin 500 mg q6h PRN, Lidocaine patch q24h scheduled  - CV: Oral Amio taper started 10/29, continue cardiac monitoring, discussed but will hold off  on anticoagulation for now  - Resp: Right chest tube to water seal, no air leak present, monitoring output, Albuterol inhaler QID  - GI: NG to LIS with q6hr flushing 30cc, tube feeds at goal (60) plan to cycle tube feeds tonight, bowel regimen, ensure NG tube working throughout the day while in              - Esophagram, video swallow study, SLP consult placed today  - : Monitoring urine output.               #urinary retention, s/p armas for 3 days, armas removed 10/29, voiding without difficulty    Colten May MD   PGY-1 Surgery

## 2023-10-30 NOTE — CONSULTS
Patient and his wife Anna Marie participated in enteral tube feeding education.  They stated they have been performing flushes and site care daily for about 3 weeks. They declined the need for education regarding those skills.    Education provided focused on the following: Demonstrated on model flushing, medication administration, and tube feeding via infinity pump.  Anna Marie was able to return demonstrate the above skills on the model.  The patient and his wife were engaged in education and asked appropriate questions.  Both learners verbalized understanding of information given.  I encouraged the patient and his wife to practice hands on with the nurses while hospitalized in order to gain confidence with the skills.    Literature given: Learning About Handwashing, Using the Enteralite Infinity Pump for Tube Feeding, Caring for Your Tube at Home.

## 2023-10-30 NOTE — PLAN OF CARE
Occupational Therapy Discharge Summary    Reason for therapy discharge:    All goals and outcomes met, no further needs identified.    Progress towards therapy goal(s). [facility] for goal details.  Goals met    Therapy recommendation(s):    No further therapy is recommended.

## 2023-10-30 NOTE — PLAN OF CARE
Neuro: A&Ox4. Calls appropriately.   Cardiac: SR occasional PVC. -130s. Continue cardiac telemetry.  Respiratory: Sating >95 on RA.  GI/: esophagram done. TF changed to cycled. Pt and spouse received education about TF setup and administration. Reviewing administration through teachback by pt.  Diet/appetite: NPO diet.   Activity:  pt SBA. Cleared by PT.   Pain: Pt report more throat pain-sore and some Rt chest site pain.  Scheduled Oxy, Tylenol, Ibuprofen.  PRN Phenol spray for throat. NG taken out this evening by MD  Skin: No new deficits noted.  LDA's:  RT chest tube site changed. Mild serous drainage noted. R Port-SL.  Rt PIV-SL.       Plan:

## 2023-10-30 NOTE — PROGRESS NOTES
"CLINICAL NUTRITION SERVICES - BRIEF NOTE     Nutrition Prescription    RECOMMENDATIONS FOR MDs/PROVIDERS TO ORDER:  Discussed transition to cycled TF. Cycled TF to begin today at 1600.      Recommendations already ordered by Registered Dietitian (RD):  Transition to cycled TF x 18 hours/day (from 9412-2963). Osmolite 1.5 Gerber (or equivalent) at goal of 80 mL/hr x 18 hours/day (1440 mL/day) + 1 pkt ProSource TF20 BID (2 pkts total) provides: 2320 kcals (27 kcal/kg), 130 g PRO (1.5 g pro/kg), 1097 ml free H20, 293 g CHO, and 0 g fiber daily.    Provided handout \"Diet after an Esophagectomy\" - pt still needs verbal explanation of handout pending diet advancement     Future/Additional Recommendations:  Continue to monitor nutrition related findings per protocol    For last full RD assessment, see note dated 10/24/2023    NEW FINDINGS   Per phone call with thoracic team this morning, begin cycling TF. Discussed plan with bedside RN and updated TF order.     TF was running continuously at goal rate of 60 mL/hr from 10/26 until morning of 10/30. Pt reports tolerating continuous TF at goal rate of 60 mL/hr without complaints of nausea/vomiting/diarrhea. Discussed transition to cycled TF with pt, and pt agreeable to new cycled TF plan.     Reviewed Labs. Lytes WNL and phos stable.      GI - Last BM documented 10/30/2023; x3 soft BM this morning per RN note 10/30.    INTERVENTIONS  Implementation  Collaboration with other providers - Thoracic team MD, bedside RN  Enteral Nutrition - Transition to cycled TF x 18 hours/day from 2035-1525; initiate at goal rate of 80 mL/hour at 1600 today  Nutrition education - provided handout \"Diet after an Esophagectomy\"; verbal explanation still needed    Monitoring/Evaluation  Will continue to monitor and evaluate per protocol.    Brynn Boss  Dietetic Intern     Richmond Pittman, JANUSZ, LD, CNSC  6B work-room RD phone: *[phone]   6B/Obs RD pager: [phone]    Weekend/Holiday RD pager " [phone]

## 2023-10-31 ENCOUNTER — APPOINTMENT (OUTPATIENT)
Dept: GENERAL RADIOLOGY | Facility: CLINIC | Age: 73
DRG: 327 | End: 2023-10-31
Attending: STUDENT IN AN ORGANIZED HEALTH CARE EDUCATION/TRAINING PROGRAM
Payer: COMMERCIAL

## 2023-10-31 LAB
ANION GAP SERPL CALCULATED.3IONS-SCNC: 11 MMOL/L (ref 7–15)
BUN SERPL-MCNC: 31.8 MG/DL (ref 8–23)
CALCIUM SERPL-MCNC: 8.7 MG/DL (ref 8.8–10.2)
CHLORIDE SERPL-SCNC: 109 MMOL/L (ref 98–107)
CREAT SERPL-MCNC: 0.93 MG/DL (ref 0.67–1.17)
DEPRECATED HCO3 PLAS-SCNC: 26 MMOL/L (ref 22–29)
EGFRCR SERPLBLD CKD-EPI 2021: 86 ML/MIN/1.73M2
ERYTHROCYTE [DISTWIDTH] IN BLOOD BY AUTOMATED COUNT: 12.2 % (ref 10–15)
GLUCOSE SERPL-MCNC: 151 MG/DL (ref 70–99)
HCT VFR BLD AUTO: 31.7 % (ref 40–53)
HGB BLD-MCNC: 10.2 G/DL (ref 13.3–17.7)
MCH RBC QN AUTO: 31.6 PG (ref 26.5–33)
MCHC RBC AUTO-ENTMCNC: 32.2 G/DL (ref 31.5–36.5)
MCV RBC AUTO: 98 FL (ref 78–100)
PHOSPHATE SERPL-MCNC: 3.9 MG/DL (ref 2.5–4.5)
PLATELET # BLD AUTO: 170 10E3/UL (ref 150–450)
POTASSIUM SERPL-SCNC: 4 MMOL/L (ref 3.4–5.3)
RBC # BLD AUTO: 3.23 10E6/UL (ref 4.4–5.9)
SODIUM SERPL-SCNC: 146 MMOL/L (ref 135–145)
WBC # BLD AUTO: 6.3 10E3/UL (ref 4–11)

## 2023-10-31 PROCEDURE — 36415 COLL VENOUS BLD VENIPUNCTURE: CPT | Performed by: STUDENT IN AN ORGANIZED HEALTH CARE EDUCATION/TRAINING PROGRAM

## 2023-10-31 PROCEDURE — 250N000011 HC RX IP 250 OP 636: Mod: JZ | Performed by: STUDENT IN AN ORGANIZED HEALTH CARE EDUCATION/TRAINING PROGRAM

## 2023-10-31 PROCEDURE — 71045 X-RAY EXAM CHEST 1 VIEW: CPT | Mod: 76

## 2023-10-31 PROCEDURE — 80048 BASIC METABOLIC PNL TOTAL CA: CPT

## 2023-10-31 PROCEDURE — 71045 X-RAY EXAM CHEST 1 VIEW: CPT

## 2023-10-31 PROCEDURE — 71045 X-RAY EXAM CHEST 1 VIEW: CPT | Mod: 26 | Performed by: RADIOLOGY

## 2023-10-31 PROCEDURE — 250N000009 HC RX 250

## 2023-10-31 PROCEDURE — 120N000003 HC R&B IMCU UMMC

## 2023-10-31 PROCEDURE — 250N000013 HC RX MED GY IP 250 OP 250 PS 637

## 2023-10-31 PROCEDURE — 36591 DRAW BLOOD OFF VENOUS DEVICE: CPT

## 2023-10-31 PROCEDURE — 250N000013 HC RX MED GY IP 250 OP 250 PS 637: Performed by: STUDENT IN AN ORGANIZED HEALTH CARE EDUCATION/TRAINING PROGRAM

## 2023-10-31 PROCEDURE — 84100 ASSAY OF PHOSPHORUS: CPT | Performed by: STUDENT IN AN ORGANIZED HEALTH CARE EDUCATION/TRAINING PROGRAM

## 2023-10-31 PROCEDURE — 85027 COMPLETE CBC AUTOMATED: CPT

## 2023-10-31 RX ORDER — HEPARIN SODIUM (PORCINE) LOCK FLUSH IV SOLN 100 UNIT/ML 100 UNIT/ML
5-10 SOLUTION INTRAVENOUS DAILY PRN
Status: DISCONTINUED | OUTPATIENT
Start: 2023-10-31 | End: 2023-11-01 | Stop reason: HOSPADM

## 2023-10-31 RX ORDER — HEPARIN SODIUM,PORCINE 10 UNIT/ML
5-10 VIAL (ML) INTRAVENOUS EVERY 24 HOURS
Status: DISCONTINUED | OUTPATIENT
Start: 2023-10-31 | End: 2023-11-01 | Stop reason: HOSPADM

## 2023-10-31 RX ORDER — HEPARIN SODIUM,PORCINE 10 UNIT/ML
5-10 VIAL (ML) INTRAVENOUS
Status: DISCONTINUED | OUTPATIENT
Start: 2023-10-31 | End: 2023-11-01 | Stop reason: HOSPADM

## 2023-10-31 RX ADMIN — ACETAMINOPHEN 650 MG: 325 TABLET, FILM COATED ORAL at 07:49

## 2023-10-31 RX ADMIN — OXYCODONE HYDROCHLORIDE 10 MG: 5 SOLUTION ORAL at 23:22

## 2023-10-31 RX ADMIN — ALBUTEROL SULFATE 4 PUFF: 90 AEROSOL, METERED RESPIRATORY (INHALATION) at 07:50

## 2023-10-31 RX ADMIN — ALBUTEROL SULFATE 4 PUFF: 90 AEROSOL, METERED RESPIRATORY (INHALATION) at 19:54

## 2023-10-31 RX ADMIN — Medication 40 MG: at 07:50

## 2023-10-31 RX ADMIN — ENOXAPARIN SODIUM 40 MG: 40 INJECTION SUBCUTANEOUS at 14:01

## 2023-10-31 RX ADMIN — OXYCODONE HYDROCHLORIDE 10 MG: 5 SOLUTION ORAL at 00:10

## 2023-10-31 RX ADMIN — SENNOSIDES AND DOCUSATE SODIUM 1 TABLET: 8.6; 5 TABLET ORAL at 07:50

## 2023-10-31 RX ADMIN — IBUPROFEN 400 MG: 200 SUSPENSION ORAL at 10:14

## 2023-10-31 RX ADMIN — OXYCODONE HYDROCHLORIDE 10 MG: 5 SOLUTION ORAL at 18:05

## 2023-10-31 RX ADMIN — ACETAMINOPHEN 650 MG: 325 TABLET, FILM COATED ORAL at 23:21

## 2023-10-31 RX ADMIN — AMIODARONE HYDROCHLORIDE 400 MG: 200 TABLET ORAL at 07:49

## 2023-10-31 RX ADMIN — LIDOCAINE PATCH 4% 1 PATCH: 40 PATCH TOPICAL at 07:52

## 2023-10-31 RX ADMIN — ACETAMINOPHEN 650 MG: 325 TABLET, FILM COATED ORAL at 15:15

## 2023-10-31 RX ADMIN — ACETAMINOPHEN 650 MG: 325 TABLET, FILM COATED ORAL at 19:52

## 2023-10-31 RX ADMIN — IBUPROFEN 400 MG: 200 SUSPENSION ORAL at 04:01

## 2023-10-31 RX ADMIN — ACETAMINOPHEN 650 MG: 325 TABLET, FILM COATED ORAL at 00:10

## 2023-10-31 RX ADMIN — IBUPROFEN 400 MG: 200 SUSPENSION ORAL at 15:15

## 2023-10-31 RX ADMIN — Medication 15 ML: at 11:34

## 2023-10-31 RX ADMIN — ALBUTEROL SULFATE 4 PUFF: 90 AEROSOL, METERED RESPIRATORY (INHALATION) at 15:16

## 2023-10-31 RX ADMIN — OXYCODONE HYDROCHLORIDE 10 MG: 5 SOLUTION ORAL at 05:57

## 2023-10-31 RX ADMIN — OXYCODONE HYDROCHLORIDE 10 MG: 5 SOLUTION ORAL at 11:34

## 2023-10-31 RX ADMIN — ACETAMINOPHEN 650 MG: 325 TABLET, FILM COATED ORAL at 04:01

## 2023-10-31 RX ADMIN — AMIODARONE HYDROCHLORIDE 400 MG: 200 TABLET ORAL at 19:53

## 2023-10-31 RX ADMIN — GABAPENTIN 100 MG: 100 CAPSULE ORAL at 21:35

## 2023-10-31 RX ADMIN — DOXAZOSIN 1 MG: 4 TABLET ORAL at 07:50

## 2023-10-31 RX ADMIN — ALBUTEROL SULFATE 4 PUFF: 90 AEROSOL, METERED RESPIRATORY (INHALATION) at 11:36

## 2023-10-31 RX ADMIN — Medication 5 ML: at 19:53

## 2023-10-31 RX ADMIN — PHENOL 1 ML: 1.5 LIQUID ORAL at 04:05

## 2023-10-31 RX ADMIN — IBUPROFEN 400 MG: 200 SUSPENSION ORAL at 21:35

## 2023-10-31 ASSESSMENT — ACTIVITIES OF DAILY LIVING (ADL)
ADLS_ACUITY_SCORE: 28

## 2023-10-31 NOTE — PLAN OF CARE
Goal Outcome Evaluation:      Plan of Care Reviewed With: patient, spouse    Overall Patient Progress: improvingOverall Patient Progress: improving    Outcome Evaluation: See RD note 10/31

## 2023-10-31 NOTE — PROGRESS NOTES
THORACIC & FOREGUT SURGERY    S:  No acute overnight events. Pt seen at bedside resting comfortably. Eager for dispo when appropriate.    O:  Vitals:    10/30/23 2322 10/31/23 0406 10/31/23 0602 10/31/23 0750   BP: 130/74 135/65  (!) 156/80   BP Location: Left arm Left arm  Left arm   Cuff Size: Adult Regular Adult Regular  Adult Regular   Pulse: 74 78  70   Resp: 18 17  16   Temp: 97.8  F (36.6  C) 98.4  F (36.9  C)  98.6  F (37  C)   TempSrc: Oral Oral  Oral   SpO2: 100%   98%   Weight:   104.6 kg (230 lb 9.6 oz)    Height:         Gen: A&Ox3, NAD, resting comfortably in bed  HEENT: NCAT, NG to LIS  Resp: Non-labored breathing on room air, right chest tube to water seal without air leak  CV: Normal sinus rhythm, hemodynamically stable  Abd: Soft, non-distended, non-tender, incisions clean dry and intact with minimal strikethrough  Ext: Distal extremities warm, no peripheral edema, moving all extremities     A/P: Alonso Copeland is a 72 y/o M with a PMHx of HLD, anemia, neuropathy, sensorineural hearing loss thyroid cancer and prostate cancer hx now s/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 King. Patient admitted to SICU post-operatively for hemodynamic monitoring. Doing well post-operatively, pain controlled on current regimen, hemodynamically stable, pending timing for esophagram/ video swallow study, likely Monday.  Patient did have recurrent A-fib with RVR and needed another bolus as well as converted back to IV amiodarone on 10/27/2023, converted to oral amio taper 10/29. Plan for esophagram, video swallow, speech eval today.       -CLD this AM  -Chest tube out this PM if output stable  -Plan for dispo to home in AM tomorrow  -Anticipate will need tube feeds for >90days for nutritional support    Marlon Perez PA-C

## 2023-10-31 NOTE — PROGRESS NOTES
CLINICAL NUTRITION SERVICES - REASSESSMENT NOTE     Nutrition Prescription    RECOMMENDATIONS FOR MDs/PROVIDERS TO ORDER:  Discussed general diet progression s/p esophagectomy with pt. Refer to thoracic team for more detailed diet progression timeline.     Malnutrition Status:    Patient does not meet two of the established criteria necessary for diagnosing malnutrition    Recommendations already ordered by Registered Dietitian (RD):  Continue TF via J-Tube at Goal Regimen: Osmolite 1.5 Gerber (or equivalent) at goal of 80 mL/hr x 18 hours/day (1440 mL/day) + 1 pkt ProSource TF20 BID (2 pkts total) provides: 2320 kcals (27 kcal/kg), 130 g PRO (1.5 g pro/kg), 1097 mL free H20, 293 g CHO, and 0 g fiber daily.    Discussed anticipated diet progression s/p esophagectomy with pt and provided     Future/Additional Recommendations:  Monitor nutrition-related findings and follow pt per protocol  If transition of TF to 12-hour cycle becomes plan of care, see recs below. Defer to home infusion.    Goal Regimen: Osmolite 1.5 Gerber (or equivalent) at goal of 120 mL/hr x 12 hours/day (1440 mL/day) + 1 pkt ProSource TF20 BID (2 pkts total) provides: 2320 kcals (27 kcal/kg), 130 g PRO (1.5 g pro/kg), 1097 mL free H20, 293 g CHO, and 0 g fiber daily.         EVALUATION OF THE PROGRESS TOWARD GOALS   Diet: Clear Liquid as of 10/31  Tolerance: Pt states consuming 80% of broth, orange jello, and lemon ice today and tolerating clear liquids well     Nutrition Support:   -Dosing weight: 87 kg   -EN access via J-Tube  -Regimen:  Osmolite 1.5 Gerber (or equivalent) at goal of 80 mL/hr x 18 hours/day (1440 mL/day) + 1 pkt ProSource TF20 BID (2 pkts total) provides: 2320 kcals (27 kcal/kg), 130 g PRO (1.5 g pro/kg), 1097 mL free H20, 293 g CHO, and 0 g fiber daily.      -Modulars: 2 pkts Prosource TF20 daily    -FWF 30 mL q 4 hours (180 mL/day) + 1097 mL from TF for total free water provision of 1277 mL/day    EN Intake - Average 5-day TF intake:  936 mL formula, 2 packets Prosource =  1564 Kcals (18 Kcal/kg), and 99 g pro (1.1 g/kg). This is meeting 72% of low-end est Kcal needs, and 95% of low-end est protein needs.     NEW FINDINGS   General:  Began cycled TF at 80 mL/hr x 18 hrs/day on 10/30 at 1600 - Pt reports not feeling a difference on new TF rate and states that he believes the 18-hour cycle will be feasible once discharged to home  Pt and pt spouse received education on TF setup and administration on 10/30    GI:  1-5 measured BMs per day over past 3 days, per I/O.   Last BM documented 10/31 - pt states that BMs are becoming more normal and that he was able to reduce the amount of bowel meds needed yesterday      Weights:  Wt fairly stable - 1.3% wt loss x 8 days (not nutritionally severe)  Date/Time Weight Weight Method   10/31/23 0602 104.6 kg (230 lb 9.6 oz) Standing scale   10/30/23 0455 103.7 kg (228 lb 9.9 oz) Standing scale   10/29/23 1600 104.6 kg (230 lb 8 oz) Standing scale   10/28/23 0300 104.2 kg (229 lb 11.5 oz) Standing scale   10/27/23 0659 103.8 kg (228 lb 13.4 oz) Standing scale   10/26/23 0400 107 kg (235 lb 14.3 oz) Standing scale   10/23/23 0549 106 kg (233 lb 11 oz) Standing scale     Labs:  K+ 4.0, Phos 3.9, Mg++ 2.3 (10/30) - Lytes WNL and stable   Urea Nitrogen 31.8 (H), Na 146 (H) - trending up likely d/t hydration status, but pt started clear liquids today   - 151 (WNL)    Medications:  Protonix, senna-docusate  Pt declined miralax 10/30     MALNUTRITION  % Intake: Decreased intake does not meet criteria  % Weight Loss: Wt loss does not meet criteria  Subcutaneous Fat Loss: None observed  Muscle Loss: None observed  Fluid Accumulation/Edema: None noted  Malnutrition Diagnosis: Patient does not meet two of the established criteria necessary for diagnosing malnutrition    Previous Goals   Once TF at goal rate, total avg nutritional intake to meet a minimum of 25 kcal/kg and 1.2 g PRO/kg daily (per dosing wt 87 kg)    Evaluation: Progressing    Previous Nutrition Diagnosis  Altered GI function related to s/p esophagectomy as evidenced by J-tube placement and need for EN support to meet pt's full nutrition needs at this time.   Evaluation: Improving - in context of diet advancement to clear liquids    CURRENT NUTRITION DIAGNOSIS  Altered GI function related to s/p esophagectomy as evidenced by J-tube placement and need for EN support to meet pt's full nutrition needs at this time.     INTERVENTIONS  Implementation  Nutrition education - discussed post esophagectomy diet progression    Goals  Total avg nutritional intake to meet a minimum of 25 kcal/kg and 1.2 g PRO/kg daily (per dosing wt 87 kg).    Monitoring/Evaluation  Progress toward goals will be monitored and evaluated per protocol.    Brynn Boss  Dietetic Intern

## 2023-10-31 NOTE — PROGRESS NOTES
Care Management Follow Up    Length of Stay (days): 8    Expected Discharge Date: 11/1     Concerns to be Addressed: discharge planning     Patient plan of care discussed at interdisciplinary rounds: Yes    Anticipated Discharge Disposition: Home   Anticipated Discharge Services: Home care, home infusion  Anticipated Discharge DME: None    Patient/family educated on Medicare website which has current facility and service quality ratings:  Yes  Education Provided on the Discharge Plan: Yes  Patient/Family in Agreement with the Plan: Yes    Referrals Placed by CM/SW: Homecare, Home Infusion, PLC  Private pay costs discussed: Not applicable    Additional Information:  Per discussion w/the primary team, patient is likely medically ready for discharge to home tomorrow. Met w/patient and his spouse, confirmed they are aware of home infusion and home care services that have been arranged. Patient completed PLC yesterday, they feel comfortable with enteral feedings at this time, voiced no concerns at this time. [facility] and [facility] have been updated.    Discharge resources:    [facility] (TF)  Phone: [phone]  Fax: [phone]     [facility] (SN)  Phone: [phone]  Fax: [phone]      Care coordination will continue to follow for discharge planning.     Brigitte Galeas, RNCC, BSN    [facility]    [address]  [address] [place] [address]    [email or web address]  [email or web address]    Office: [phone] Pager: [phone]

## 2023-10-31 NOTE — PLAN OF CARE
Neuro: A&Ox4. Calls appropriately.  Cardiac: SR w/ PVC. BP 120s-130s.    Respiratory: Sating >95 on RA.  GI/: Adequate urine output.  No BM on shift.  Diet/appetite: Sips of clear liquids.  Cycled feedings via J tube from 4 pm - 10 am.  Activity:  Standby assist, up to commode, bathroom, standing at bedside w/ urinal.    Pain: Pt reports pain on throat and R side abdomen - scheduled oxy, tylenol, ibuprofen.  Phenol spray PRN.    Skin: No new deficits noted.  LDA's:  Rt Chest tube to water seal.  R Port-SL.  Rt PIV-SL.       Plan: Continue with POC. Notify primary team with changes.

## 2023-10-31 NOTE — PLAN OF CARE
Neuro: Patient pleasant, alert and oriented x4.   Cardiac: NSR 60's-70's. BP 120s-140s. Afebrile.   Respiratory: Sating >95 on RA.  GI/: Adequate urine output via urinal. 1 BM this shift.   Diet/appetite: Advanced to clear liquid diet, tolerating well. On cycled TF from 4p-10a at 80ml/hr. 30 ml FWF q4hrs into PEG.   Activity: Stand by assist. Steady on feet. Walked in hallway with wife multiple times today.   Pain: Scheduled Oxy, Ibuprofen, and Tylenol.   Skin: No new deficits noted. Lap sites with steri strips. Dressing over CT site.   LDA's: Right port - SL.  Left PIV - SL.      Plan: Right chest tube removed. Follow-up x-ray completed. Plans for patient to discharge tomorrow. Wife here throughout the day and supportive. Will continue with plan of care.

## 2023-11-01 ENCOUNTER — APPOINTMENT (OUTPATIENT)
Dept: GENERAL RADIOLOGY | Facility: CLINIC | Age: 73
DRG: 327 | End: 2023-11-01
Attending: STUDENT IN AN ORGANIZED HEALTH CARE EDUCATION/TRAINING PROGRAM
Payer: COMMERCIAL

## 2023-11-01 ENCOUNTER — APPOINTMENT (OUTPATIENT)
Dept: SPEECH THERAPY | Facility: CLINIC | Age: 73
DRG: 327 | End: 2023-11-01
Attending: STUDENT IN AN ORGANIZED HEALTH CARE EDUCATION/TRAINING PROGRAM
Payer: COMMERCIAL

## 2023-11-01 ENCOUNTER — DOCUMENTATION ONLY (OUTPATIENT)
Dept: SURGERY | Facility: CLINIC | Age: 73
End: 2023-11-01

## 2023-11-01 VITALS
TEMPERATURE: 98.9 F | RESPIRATION RATE: 18 BRPM | HEIGHT: 72 IN | DIASTOLIC BLOOD PRESSURE: 67 MMHG | BODY MASS INDEX: 31.71 KG/M2 | OXYGEN SATURATION: 99 % | HEART RATE: 63 BPM | WEIGHT: 234.13 LBS | SYSTOLIC BLOOD PRESSURE: 124 MMHG

## 2023-11-01 LAB
ANION GAP SERPL CALCULATED.3IONS-SCNC: 10 MMOL/L (ref 7–15)
BUN SERPL-MCNC: 29.3 MG/DL (ref 8–23)
CALCIUM SERPL-MCNC: 8.3 MG/DL (ref 8.8–10.2)
CHLORIDE SERPL-SCNC: 106 MMOL/L (ref 98–107)
CREAT SERPL-MCNC: 0.92 MG/DL (ref 0.67–1.17)
DEPRECATED HCO3 PLAS-SCNC: 26 MMOL/L (ref 22–29)
EGFRCR SERPLBLD CKD-EPI 2021: 87 ML/MIN/1.73M2
ERYTHROCYTE [DISTWIDTH] IN BLOOD BY AUTOMATED COUNT: 12.3 % (ref 10–15)
GLUCOSE SERPL-MCNC: 128 MG/DL (ref 70–99)
HCT VFR BLD AUTO: 30.4 % (ref 40–53)
HGB BLD-MCNC: 9.7 G/DL (ref 13.3–17.7)
MCH RBC QN AUTO: 31.4 PG (ref 26.5–33)
MCHC RBC AUTO-ENTMCNC: 31.9 G/DL (ref 31.5–36.5)
MCV RBC AUTO: 98 FL (ref 78–100)
PATH REPORT.COMMENTS IMP SPEC: NORMAL
PATH REPORT.FINAL DX SPEC: NORMAL
PATH REPORT.GROSS SPEC: NORMAL
PATH REPORT.INTRAOP OBS SPEC DOC: NORMAL
PATH REPORT.MICROSCOPIC SPEC OTHER STN: NORMAL
PATH REPORT.RELEVANT HX SPEC: NORMAL
PATHOLOGY SYNOPTIC REPORT: NORMAL
PHOSPHATE SERPL-MCNC: 3.5 MG/DL (ref 2.5–4.5)
PHOTO IMAGE: NORMAL
PLATELET # BLD AUTO: 182 10E3/UL (ref 150–450)
POTASSIUM SERPL-SCNC: 3.9 MMOL/L (ref 3.4–5.3)
RBC # BLD AUTO: 3.09 10E6/UL (ref 4.4–5.9)
SODIUM SERPL-SCNC: 142 MMOL/L (ref 135–145)
WBC # BLD AUTO: 4.8 10E3/UL (ref 4–11)

## 2023-11-01 PROCEDURE — 92526 ORAL FUNCTION THERAPY: CPT | Mod: GN

## 2023-11-01 PROCEDURE — 36591 DRAW BLOOD OFF VENOUS DEVICE: CPT

## 2023-11-01 PROCEDURE — 71045 X-RAY EXAM CHEST 1 VIEW: CPT

## 2023-11-01 PROCEDURE — 250N000013 HC RX MED GY IP 250 OP 250 PS 637

## 2023-11-01 PROCEDURE — 80048 BASIC METABOLIC PNL TOTAL CA: CPT

## 2023-11-01 PROCEDURE — 250N000011 HC RX IP 250 OP 636: Mod: JZ | Performed by: STUDENT IN AN ORGANIZED HEALTH CARE EDUCATION/TRAINING PROGRAM

## 2023-11-01 PROCEDURE — 85027 COMPLETE CBC AUTOMATED: CPT

## 2023-11-01 PROCEDURE — 84100 ASSAY OF PHOSPHORUS: CPT | Performed by: STUDENT IN AN ORGANIZED HEALTH CARE EDUCATION/TRAINING PROGRAM

## 2023-11-01 PROCEDURE — 250N000009 HC RX 250

## 2023-11-01 PROCEDURE — 250N000013 HC RX MED GY IP 250 OP 250 PS 637: Performed by: STUDENT IN AN ORGANIZED HEALTH CARE EDUCATION/TRAINING PROGRAM

## 2023-11-01 PROCEDURE — 71045 X-RAY EXAM CHEST 1 VIEW: CPT | Mod: 26 | Performed by: RADIOLOGY

## 2023-11-01 RX ORDER — OXYCODONE HCL 5 MG/5 ML
5-10 SOLUTION, ORAL ORAL EVERY 6 HOURS
Qty: 350 ML | Refills: 0 | Status: SHIPPED | OUTPATIENT
Start: 2023-11-01 | End: 2023-11-01

## 2023-11-01 RX ORDER — LIDOCAINE 4 G/G
1 PATCH TOPICAL EVERY 24 HOURS
COMMUNITY
Start: 2023-11-01

## 2023-11-01 RX ORDER — AMOXICILLIN 250 MG
1-2 CAPSULE ORAL 2 TIMES DAILY
Qty: 40 TABLET | Refills: 0 | Status: SHIPPED | OUTPATIENT
Start: 2023-11-01

## 2023-11-01 RX ORDER — METHOCARBAMOL 1000 MG/1
1000 TABLET, FILM COATED ORAL EVERY 6 HOURS PRN
Qty: 20 TABLET | Refills: 0 | Status: SHIPPED | OUTPATIENT
Start: 2023-11-01

## 2023-11-01 RX ORDER — AMIODARONE HYDROCHLORIDE 200 MG/1
TABLET ORAL
Qty: 16 TABLET | Refills: 0 | Status: SHIPPED | OUTPATIENT
Start: 2023-11-01 | End: 2023-11-24

## 2023-11-01 RX ORDER — ACETAMINOPHEN 325 MG/10.15ML
650 LIQUID ORAL EVERY 4 HOURS PRN
Status: DISCONTINUED | OUTPATIENT
Start: 2023-11-01 | End: 2023-11-01 | Stop reason: HOSPADM

## 2023-11-01 RX ORDER — IBUPROFEN 100 MG/5ML
400 SUSPENSION, ORAL (FINAL DOSE FORM) ORAL EVERY 6 HOURS
COMMUNITY
Start: 2023-11-01

## 2023-11-01 RX ORDER — OXYCODONE HCL 5 MG/5 ML
5-10 SOLUTION, ORAL ORAL EVERY 6 HOURS
Qty: 350 ML | Refills: 0 | Status: SHIPPED | OUTPATIENT
Start: 2023-11-01

## 2023-11-01 RX ADMIN — IBUPROFEN 400 MG: 200 SUSPENSION ORAL at 03:33

## 2023-11-01 RX ADMIN — DOXAZOSIN 1 MG: 4 TABLET ORAL at 08:07

## 2023-11-01 RX ADMIN — IBUPROFEN 400 MG: 200 SUSPENSION ORAL at 10:22

## 2023-11-01 RX ADMIN — AMIODARONE HYDROCHLORIDE 400 MG: 200 TABLET ORAL at 08:07

## 2023-11-01 RX ADMIN — SENNOSIDES AND DOCUSATE SODIUM 1 TABLET: 8.6; 5 TABLET ORAL at 08:08

## 2023-11-01 RX ADMIN — ALBUTEROL SULFATE 4 PUFF: 90 AEROSOL, METERED RESPIRATORY (INHALATION) at 08:13

## 2023-11-01 RX ADMIN — Medication 40 MG: at 08:07

## 2023-11-01 RX ADMIN — Medication 5 ML: at 12:16

## 2023-11-01 RX ADMIN — OXYCODONE HYDROCHLORIDE 10 MG: 5 SOLUTION ORAL at 11:28

## 2023-11-01 RX ADMIN — Medication 15 ML: at 11:28

## 2023-11-01 RX ADMIN — OXYCODONE HYDROCHLORIDE 10 MG: 5 SOLUTION ORAL at 06:22

## 2023-11-01 ASSESSMENT — ACTIVITIES OF DAILY LIVING (ADL)
ADLS_ACUITY_SCORE: 28

## 2023-11-01 NOTE — PROVIDER NOTIFICATION
"Provider notified via [facility] smartweb    \"6B [id number] PP pt has scheduled Tylenol due @ 0400, can we get that switched to the liquid Tylenol? Nae FRITZ [phone]\"  "

## 2023-11-01 NOTE — DISCHARGE SUMMARY
NAME: Alonso Rogel   MRN: [id number]   : 1950     DATE OF ADMISSION: 10/23/2023     PRE/POSTOPERATIVE DIAGNOSES: Malignant neoplasm of the esophagus    PROCEDURES PERFORMED: Minimally Invasive Esophagectomy    PATHOLOGY RESULTS:   A. FINAL GASTRIC MARGIN, RESECTION:  - Unremarkable gastric wall  - No H. pylori-like organisms identified on routine staining  - Negative for intestinal metaplasia, dysplasia, and malignancy     B. ESOPHAGUS, PERIESOPHAGEAL TISSUE, RESECTION:  - Six lymph nodes, negative for metastatic carcinoma (0/6)     C. ESOPHAGUS AND STOMACH, ESOPHAGOGASTRECTOMY:  - No residual viable tumor cells present, tumor bed is (3.5 cm)  - Surgical margins negative for dysplasia, intestinal metaplasia, and malignancy  - Fifteen lymph nodes, negative for malignancy (0/15)  - Additional findings: Intestinal metaplasia with high-grade dysplasia within tumor bed    CULTURE RESULTS: None     INTRAOPERATIVE COMPLICATIONS: None     POSTOPERATIVE MEDICAL ISSUES: None     DRAINS/TUBES PRESENT AT DISCHARGE: J tube    DATE OF DISCHARGE:  23    HOSPITAL COURSE: Alonso Rogel is a 73 year old male who on 10/23/2023 who underwent the above-named procedures. He tolerated the operation well and postoperatively was transferred to the surgical ICU for hemodynamic monitoring. He did not require prolonged intubation nor pressor requirement for blood pressure support during the post operative period. Patient did go into atrial fibrillation with rapid ventricular response with was management with pharmacological methods. Patient deemed appropriate for the floor and subsequently transferred to intermediate care unit on post operative day #1. The remainder of his course was essentially uncomplicated. Patient completed XR Esophagram, video swallow study, and speech consult on post operative day #7 which showed no evidence of anastomotic leak, aspiration, and was cleared for a clear liquid diet. Prior to discharge,  his pain was controlled well, he was able to perform ADLs and ambulate independently without difficulty, tolerating cycled tube feeds at goal and had full return of bowel and bladder function. On 11/1/23, he was discharged to home in stable condition with J tube in place.    DISCHARGE EXAM:   A&O, NAD  Resp non-labored  Distal extremities warm  Incisions clean dry and intact    DISCHARGE INSTRUCTIONS:  Discharge Procedure Orders   Home Care Referral   Referral Priority: Routine: Next available opening Referral Type: Home Health Therapies & Aides   Number of Visits Requested: 1     Home Infusion Referral   Referral Priority: Routine: Next available opening Referral Type: Consultation   Number of Visits Requested: 1     Reason for your hospital stay   Order Comments: surgery     Activity   Order Comments: As in discharge instruction section     Order Specific Question Answer Comments   Is discharge order? Yes      Discharge Instructions   Order Comments: -DIET: Clear liquids for 2 days after discharge, Full liquids for 3-5 days thereafter, then progress to a soft diet as tolerated until follow up.    -Slowly introduce new liquids and foods at each stage.  If food or liquid begins to become difficult to swallow, call the thoracic team to arrange for further care    -Take 4-5 small meals per day, or more if necessary.      You will continue tube feeds until follow up, so food by mouth is primarily for pleasure.  Transition to each new diet stage and introduce new foods slowly to  how well you tolerate them.    Follow instructions provided by the dietician and the speech therapist    Sleep with the head of your bed elevated to help prevent reflux, which is common after esophagectomy.  In addition, you should remain in an upright position for 1-2 hours after meals.     If your travel plans upon discharge include prolonged periods of sitting (a lengthy car or plane ride), it is highly beneficial to get up and walk at  "least once per hour to help prevent swelling and blood clots.     You may remove chest tube dressing 48 hours after tube removal and bandage the site at your own discretion thereafter.  Small amounts of leakage are normal for 2-3 days after removal.  Feel free to call with questions.    Do not submerge, soak, or scrub incision or swim until seen in follow-up.    Take incentive spirometer home for continued frequent use    Activity as tolerated, no strenous activity until seen in follow-up, no lifting greater than 10 pounds for the next 2-4 weeks.    Stay hydrated. Take over the counter fiber (metamucil or benefiber) and stool softeners (Miralax, docusate or senna) if becoming constipated.     Call for fever greater than 101.5, chills, increased size of incision, red skin around incision, vision changes, muscle strength changes, sensation changes, shortness of breath, or other concerns.    No driving while taking narcotic pain medication.    Transition to ibuprofen or tylenol/acetaminophen for pain control. Do not take tylenol/acetaminophen and acetaminophen containing narcotic (e.g., percocet or vicodin) at the same time. If you have known ulcer problems, or kidney trouble (elevated creatinine) do not take the ibuprofen.    In emergencies, call 911    For other Questions or Concerns;   A.) During weekday working hours (Monday through Friday 8am to 4:30pm)   call [phone] and ask to speak to a clinical nurse specialist.     B.) At nights (after 4:30pm), on weekends, or if urgent call [phone] and   tell the  \"I would like to page job code [phone], the thoracic surgery   fellow on call, please.\"     Follow Up ([facility])   Order Comments: Esophogram on 11/17    Provider visit on 11/20  RETURN CCSL  1:15 PM  (45 min.)  Inocencia Garibay APRN [facility]  Cancer [facility]      Appointments on [place] and/or [facility] (with [facility] or [facility] provider or service). Call " [phone] if you haven't heard regarding these appointments within 7 days of discharge.     Tubes   Order Comments: J TUBE INSTRUCTIONS:    Flush your feeding tube with 30cc of water;  1. After medication administration through the feeding tube  2. Before and after tube feeds  3. Every 8 hours while awake if you have not used the tube during that time      -If possible take medications by mouth or as solution via j tube (Do not put crushed pills through the tube if possible)     -Change the gauze around your tube daily or more often if soiled    -KEEP A FLEXI-TRACK (or other tube retention device) on your tube at all times to prevent incidental removal.     Diet   Order Comments: Follow this diet upon discharge: Orders Placed This Encounter          Adult Formula Drip Feeding: Continuous Osmolite 1.5; Jejunostomy; Goal Rate: 80 mL/hr x 18 hours/day (1725-6116); mL/hr; From: 4:00 PM; To: 10:00 AM      Clear Liquid Diet     Order Specific Question Answer Comments   Is discharge order? Yes        DISCHARGE MEDICATIONS:   Current Discharge Medication List        START taking these medications    Details   amiodarone (PACERONE) 200 MG tablet 1 tablet (200 mg) by Oral or FT or NG tube route 2 times daily for 2 days, THEN 1 tablet (200 mg) daily for 21 days.  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

## 2023-11-01 NOTE — PROGRESS NOTES
DISCHARGE                       Discharged to: Home  Via: private transportation  Accompanied by: Family  Discharge Instructions: diet, activity, medications, follow up appointments, when to call the MD, aftercare instructions.  Prescriptions: To be filled by discharge pharmacy; medication list reviewed & sent with pt  Follow Up Appointments: arranged; information given  Belongings: All sent with pt  IV: d/c'd  Telemetry: d/c'd  Pt exhibits understanding of above discharge instructions; all questions answered.    Discharge Paperwork: Signed, copied, and sent home with patient.

## 2023-11-01 NOTE — PLAN OF CARE
"Speech Language Therapy Discharge Summary    Reason for therapy discharge:    All goals and outcomes met, no further needs identified.    Progress towards therapy goal(s). See goals on Care Plan in [facility] electronic health record for goal details.  Goals met    Therapy recommendation(s):    From SLP perspective, pt OK for thin liquids with use of small sips and supraglottic swallow strategy (swallow, cough/throat clear, swallow again). Defer to thoracic MD for diet advancement. No additional SLP services indicated at d/c.     VFSS prior to esophagram completed 10/30 which revealed \"mildly reduced hyolaryngeal excursion, reduced BOT retraction, and suspected generalized pharyngeal weakness resulting in consistent trace amount penetrated material during and after the swallow not to the level of the vocal folds, and adequate volitional cough function to eject penetrated material, as well as mild amount of vallecular residue and trace amount pyriform residue. Suspect overall pharyngeal weakness given medical status and lack of PO intake for >1 week. Chin tuck not effective in preventing penetration.\" At time of discharge, pt was provided with training re: pharyngeal strengthening exercises and safe-swallowing strategies.       "

## 2023-11-01 NOTE — PROGRESS NOTES
CLINICAL NUTRITION SERVICES - BRIEF NOTE     Nutrition Prescription     Recommendations already ordered by Registered Dietitian (RD):  Provided education r/t shortening TF cycle while still providing same volume of TFs per pt request (desire to shorten cycle further upon discharge)     CURRENT TF regimen: Osmolite 1.5 Gerber (or equivalent) at goal of 80 ml/hr x 18 hours/day (from 2860-8816) (1440ml/day) + 1 pkt ProSource TF20 BID (2 pkts total) provides: 2320 kcals (27 kcal/kg), 130 g PRO (1.5 g pro/kg), 1097 ml free H20, 293 g CHO, and 0 g fiber daily.     - If goal for 16 hour cycle, adjust rate to 90 mL/hr  - If goal for 15-hour cycle, adjust rate to ~95 mL/hr  - If goal for 14-hour cycle, adjust rate to ~100 mL/hr  - If goal for 12-hour cycle, adjust rate to 120 mL/hr   + protein modular packets as prescribed     For last full RD assessment, see note dated 10/31/23    Currently tolerating TFs well without complaint, aside from general fullness. On clear liquid diet, so no meaningful intake beyond TFs. Has questions related to how to shorten  his TF cycle further upon discharge.    Discussed that [facility] RD will be in contact with pt following discharge and can provide assistance with shortening TF cycle pending pt tolerance, PO intake, diet progression.     Provided general recommendations for self-shortening TF cycle upon discharge, pending pt tolerance/preference. Pt states his ideal goal is for 12-hour TFs 2062-5476. Discussed option of self-titrating up on his rate by ~10 mL Q4hrs as tolerated until reaching rate of ~120 mL/hr (which would be his goal if 12-hour cycle); alternatively discussed slowly shortening his TF cycle day-by-day (for example shortening to a 16 hour cycle tonight, if tolerated progress to a 14  hour cycle tomorrow, etc).     Pt understands the importance receiving his full TF volume (1440 mL/day), though if he begins taking oral nutrition supplements in future, his goal TF rate may be  adjusted but again, rec discussing with [facility] RD rather than self-adjusting feeding rate/volume.     INTERVENTIONS  Implementation  Enteral Nutrition - 18-hour cycle currently ordered but education provided for self-titrating TFs as tolerated in home setting.     Monitoring/Evaluation  Will continue to monitor and evaluate per protocol.    Richmond Pittman RDN, LD, CNSC  6B work-room RD phone: *[phone]   6B/Obs RD pager: [phone]    Weekend/Holiday RD pager [phone]

## 2023-11-01 NOTE — PROGRESS NOTES
Discharge date: 11/01/23 (SOC)  Discharge therapies to be provided by [facility]: Enteral  Formula: Osmolite 1.5  Rate/Dose: 80ml/hr x 18 hours  Provider to manage enteral at home: Dr. Say Pruitt  Estimated length of need: 90 days or more  Education completed: PLC teaching done 10/30/23  with pt and wife  Delivery/supplies: Delivery to pt's home on day of discharge. Supplies: No scale needed have one in the home, 2X2 gauze, Osmolite 1.5 formula, back pack, feeding bags, 60ml syringes, medication syringes, med cups, Prosource, flexi trak, and y connector.  Agency: [facility]  SNV: NA  Notes: Met with pt and his wife at bedside.  Pt demonstrated how to set up back pack feeding bag and pump and did very well while his wife watched.  Explained that a Welcome folder will be delivered with his supplies and asked for them to go through that folder because there is information that they may have to refer back to. Went over the sign and symptoms of when to contact Provider/I.  And went over the steps to do if the feeding tube was to get clogged.  Emphasized that when he is receiving the tube feeding and/or medications via tube, pt will need need to be at a 30 degrees or higher. And if after medications will have to sit up for 30 to 60 minutes after medications have been administered.  Asked questions well and this writer was able to answer.  Both pt and his wife feel very comfortable of independence with administering tube feeding. Encouraged them to contact [facility] with any questions or concerns.      Thank you for the referral.    Milvia Garcia LPN  Enteral nurse Liaison  [facility]  [address] [place] [address]  [phone]

## 2023-11-01 NOTE — PLAN OF CARE
V/S: VSS, afebrile. -140s. HR 60-90s.  Neuro: Pt is A&O x4, no c/o headache & lightheadedness. Has baseline numbness & tingling in R hand (no new sensations noted), calls appropriately.  Resp: RA. Sats > 95, no c/o SOB. Lung sounds clear & diminished in bases w/ some expiratory wheezes (see flowsheets).  Cardiac: Tele SR. No c/o chest pain & palpitations.  GI/: No BG checks. Clear liquid diet, tolerating well. Cycled tube feeds (4pm-10am) in PEG tube (j-tube) @ 80 ml/hr, free water flushes @ 30 ml q4h, tolerating well. No FR. No c/o n/v/d. Voiding adequately via toilet (urine clear w/out foul odor). Last BM 10/31/2023.  Skin: Skin dry & pale w/ scattered bruising. No new deficits noted.  Pain: c/o incisional pain near old chest tube sites, given scheduled pain meds (pt has no prn orders, see MAR).  Activity: SBA in room & in hallways.  Electrolytes: No replacements needed, recheck in am.  LDAs: R chest port heparin locked & WDL. PEG tube site CDI & WDL.    Plan is to discharge 11/1, time TBD.     Plan of care ongoing.  Patient currently resting in bed with call light in reach.     Goal Outcome Evaluation:    Overall Patient Progress: improvingOverall Patient Progress: improving    Outcome Evaluation: Pt chest tubes pulled 10/31, tolerating well. Plan is to discharge pt 11/1

## 2023-11-02 ENCOUNTER — PATIENT OUTREACH (OUTPATIENT)
Dept: CARE COORDINATION | Facility: CLINIC | Age: 73
End: 2023-11-02
Payer: COMMERCIAL

## 2023-11-02 NOTE — PROGRESS NOTES
Clinic Care Coordination Contact  [facility]: Post-Discharge Note  SITUATION                                                      Admission:    Admission Date: 10/23/23   Reason for Admission: Esophageal cancer  Discharge:   Discharge Date: 11/01/23  Discharge Diagnosis: Esophageal cancer    BACKGROUND                                                      Gen: A&Ox3, NAD, resting comfortably in bed  HEENT: NCAT, NG to LIS  Resp: Non-labored breathing on room air, right chest tube to water seal without air leak  CV: Normal sinus rhythm, hemodynamically stable  Abd: Soft, non-distended, non-tender, incisions clean dry and intact with minimal strikethrough  Ext: Distal extremities warm, no peripheral edema, moving all extremities      A/P: Alonso Copeland is a 72 y/o M with a PMHx of HLD, anemia, neuropathy, sensorineural hearing loss thyroid cancer and prostate cancer hx now s/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 King. Patient admitted to SICU post-operatively for hemodynamic monitoring. Doing well post-operatively, pain controlled on current regimen, hemodynamically stable, pending timing for esophagram/ video swallow study, likely Monday.  Patient did have recurrent A-fib with RVR and needed another bolus as well as converted back to IV amiodarone on 10/27/2023     - Pain/Neuro: Schedule tylenol and motrin, Scheduled Oxy 10 mg per J tube q6h, Tylenol q8h Scheduled, Gabapentin 100 mg at bedtime scheduled, Robaxin 500 mg q6h PRN, Lidocaine patch q24h scheduled. Discontinued PCA  - CV: IV amio, continue cardiac monitoring; discussed but will hold off on anticoagulation for now   - Resp: Right chest tube to water seal, no air leak present, monitoring output, Albuterol inhaler QID  - GI: NG to LIS with q6hr flushing 30cc, tube feeds at goal (60), bowel regimen, ensure NG tube working throughout the day  - : Monitoring urine output. Baca replaced  10/26 due to urinary retention, will remove tomorrow and trial off armas   - ID: Perioperative antibiotics completed, continuing to monitor fever curve  - Heme: Daily CBCs, trend hemoglobin  - Renal/FEN: No IVF  Ppx: Lovenox 40 mg q24h, Protonix  Drains/lines/tubes with settings : Chest tube x1 to water seal  - Lab may draw from the port  PT/OT/SW: PT OT  Dispo 6B       ASSESSMENT           Discharge Assessment  How are you doing now that you are home?: Spoke with patient and wife. Patient is doing well, it did take some time to get tube feeding all set up last night and it worked well overnight. Hasn't heard from home care yet, and will reach out if they don't here from them Reviewed AVS together and had no further questions or concerns  How are your symptoms? (Red Flag symptoms escalate to triage hotline per guidelines): Improved  Do you feel your condition is stable enough to be safe at home until your provider visit?: Yes  Does the patient have their discharge instructions? : Yes  Does the patient have questions regarding their discharge instructions? : No  Were you started on any new medications or were there changes to any of your previous medications? : Yes  Does the patient have all of their medications?: Yes  Do you have questions regarding any of your medications? : No  Do you have all of your needed medical supplies or equipment (DME)?  (i.e. oxygen tank, CPAP, cane, etc.): Yes  Discharge follow-up appointment scheduled within 14 calendar days? : Yes  Discharge Follow Up Appointment Date: 11/20/23  Discharge Follow Up Appointment Scheduled with?: Specialty Care Provider    Post-op ([facility] CTA Only)  If the patient had a surgery or procedure, do they have any questions for a nurse?: No    Post-op (Clinicians Only)  Did the patient have surgery or a procedure: Yes  Incision: closed  Bleeding: light  Fever: No  Chills: No  Redness: No  Warmth: No  Swelling: No        PLAN                                                       Outpatient Plan:  sophogram on 11/17     Provider visit on 11/20  RETURN CCSL  1:15 PM  (45 min.)  Inocencia Garibay APRN CNS  [facility]  Cancer Clinic        Appointments on [place] and/or [facility] (with [facility] or [facility] provider or service). Call [phone] if you haven't heard regarding these appointments within 7 days of discharge.    Future Appointments   Date Time Provider Department Center   11/17/2023  2:00 PM [address]   11/20/2023  1:30 PM Inocencia Garibay APRN CNS [facility]         For any urgent concerns, please contact our 24 hour nurse triage line: [phone] ([phone])         THUAN Curiel

## 2023-11-03 ENCOUNTER — PATIENT OUTREACH (OUTPATIENT)
Dept: SURGERY | Facility: CLINIC | Age: 73
End: 2023-11-03
Payer: COMMERCIAL

## 2023-11-03 NOTE — PROGRESS NOTES
Post Op Discharge Call    Surgery:  Minimally invasive esophagectomy    Surgery date: 10/23/23    Discharge Date:  11/2/23    Immediate Concerns: None at this time. Pt reports that overall, he feels like he is doing well.     Pt states that he received a call from home care nursing who plan to come next week (Sunday or Monday) for initial evaluation.     Pain:  Using pain medications as recommended with appropriate relief.   Tylenol. Needed oxycodone the first night, now well controlled with OTC.  Incision:   No concerns, healing well, no redness, drainage or edema reported.     Drains:   No drain.     Diet:   Liquid diet, tolerating well, no concerns with oral intake. Reviewed progression from clear liquid to full liquid to soft diet. Pt voiced understanding.     He states he has a good understanding of j tube feedings and has all needed supplies at home.    Bowels:   Passing gas. Held senna today for loose stools. Having daily bowel movements. No concerns.    Activity:   No difficulty with ADLs reported. Does become fatigued easily, we discussed this is to be expected. Pt ambulating independently down driveway (300ft) twice daily without issue. Able to complete ADL's.    Post op/follow up plans:   Post op appointment scheduled,confirmed date and time with patient.       Future Appointments   Date Time Provider Department Center   11/17/2023  2:00 PM [address]   11/20/2023  1:30 PM Inocencia Garibay APRN [facility]         Patient has our direct number for any questions or concerns that may arise. Pt has on call thoracic surgery phone number for concerns over the weekend.      Jessica Ku RN BSN  Thoracic Surgery RN Care Coordinator  [phone]

## 2023-11-08 ENCOUNTER — TELEPHONE (OUTPATIENT)
Dept: SURGERY | Facility: CLINIC | Age: 73
End: 2023-11-08

## 2023-11-08 NOTE — TELEPHONE ENCOUNTER
A prior authorization is needed for the following compounded medications prescribed.  Please complete a prior authorization with the information included below.    Medication:PANTOPRAZOLE 2mg/ml     Ingredients                                                                  NDCs                                                Quantities                       Pantoprazole sod Tabs                                                   [address]-55                                                 20 tabs  SOD BICARB POWD                                                 [id number]                                              33.6 grams  STERILE WATER FOR IRR                                               [id number]                                           395.ml        RX #:[id number]  Reason for Rejection:THIS COMPOUND MAY REQUIRE REVIEW. PLEASE  CALL CUSTOMER SERVICE AT [phone].  CHECK COMPOUND INGREDIENTS    CANNOT USE AN 08 OVERRIDE      Pharmacy Insurance plan:[facility] PART D  BIN #:[id number]  ID #:[id number]  PCN #:[id number]  Phone #:[phone]      Pharmacy NPI:[id number]      Please advise the pharmacy when the prior authorization is approved or denied.     Thank you for your time.        Gurjit Toney    [place]  [facility]   [address] [place] [address]   Phone: [phone]  Fax: [phone]

## 2023-11-10 NOTE — TELEPHONE ENCOUNTER
Central Prior Authorization Team   Phone: [phone]    PA Initiation    Medication: COMPOUNDED NON-CONTROLLED (CMPD RX) - PHARMACY TO MIX COMPOUNDED MEDICATION  Insurance Company: [employer] - Phone [phone] Fax [phone]  Pharmacy Filling the Rx: [facility] - [place], [place] - [address]  Filling Pharmacy Phone:    Filling Pharmacy Fax:    Start Date: 11/10/2023

## 2023-11-11 NOTE — TELEPHONE ENCOUNTER
PRIOR AUTHORIZATION DENIED    Medication: COMPOUNDED NON-CONTROLLED (CMPD RX) - PHARMACY TO MIX COMPOUNDED MEDICATION  Insurance Company: [employer] - Phone [phone] Fax [phone]  Denial Date: 11/10/2023  Denial Rationale: Excluded      Appeal Information:

## 2023-11-17 ENCOUNTER — ANCILLARY PROCEDURE (OUTPATIENT)
Dept: GENERAL RADIOLOGY | Facility: CLINIC | Age: 73
End: 2023-11-17
Attending: CLINICAL NURSE SPECIALIST
Payer: COMMERCIAL

## 2023-11-17 ENCOUNTER — PATIENT OUTREACH (OUTPATIENT)
Dept: SURGERY | Facility: CLINIC | Age: 73
End: 2023-11-17

## 2023-11-17 DIAGNOSIS — C15.4 MALIGNANT NEOPLASM OF MIDDLE THIRD OF ESOPHAGUS (H): Primary | ICD-10-CM

## 2023-11-17 DIAGNOSIS — C15.9 MALIGNANT NEOPLASM OF ESOPHAGUS, UNSPECIFIED LOCATION (H): ICD-10-CM

## 2023-11-17 LAB — RADIOLOGIST FLAGS: ABNORMAL

## 2023-11-17 PROCEDURE — 74221 X-RAY XM ESOPHAGUS 2CNTRST: CPT | Mod: GC | Performed by: RADIOLOGY

## 2023-11-17 RX ADMIN — DIATRIZOATE MEGLUMINE AND DIATRIZOATE SODIUM 120 ML: 660; 100 SOLUTION ORAL; RECTAL at 14:26

## 2023-11-17 NOTE — PROGRESS NOTES
[facility]: Cancer Care                                                                                            Per request of Inocencia GOOD, call placed to pt to discuss small outpouching noted on today's esophagram and see if he is having any issues swallowing.    Pt reports he has no issues swallowing but his main concern is feeling very full from tube feeds. FVHI just decreased from 6 to 4 cartons, running from 8pm to 5 am. He mentions this has helped. We discussed he can address this at thoracic follow up on Monday.    He denies any infectious symptoms including fever. He reports a mild cough not associated with eating. He is taking all medications via j tube. APPLE updated.    Jessica Ku, RN BSN  Thoracic Surgery RN Care Coordinator  [phone]

## 2023-11-20 ENCOUNTER — ONCOLOGY VISIT (OUTPATIENT)
Dept: SURGERY | Facility: CLINIC | Age: 73
End: 2023-11-20
Payer: COMMERCIAL

## 2023-11-20 VITALS
TEMPERATURE: 98.2 F | RESPIRATION RATE: 20 BRPM | OXYGEN SATURATION: 96 % | HEART RATE: 73 BPM | SYSTOLIC BLOOD PRESSURE: 151 MMHG | WEIGHT: 234.6 LBS | DIASTOLIC BLOOD PRESSURE: 73 MMHG | BODY MASS INDEX: 32.26 KG/M2

## 2023-11-20 DIAGNOSIS — C15.4 MALIGNANT NEOPLASM OF MIDDLE THIRD OF ESOPHAGUS (H): Primary | ICD-10-CM

## 2023-11-20 PROCEDURE — 99024 POSTOP FOLLOW-UP VISIT: CPT | Performed by: CLINICAL NURSE SPECIALIST

## 2023-11-20 PROCEDURE — G0463 HOSPITAL OUTPT CLINIC VISIT: HCPCS | Performed by: CLINICAL NURSE SPECIALIST

## 2023-11-20 RX ORDER — TRIAMCINOLONE ACETONIDE 1 MG/G
CREAM TOPICAL
COMMUNITY
Start: 2023-02-15

## 2023-11-20 RX ORDER — NYSTATIN 100000/ML
SUSPENSION, ORAL (FINAL DOSE FORM) ORAL
COMMUNITY

## 2023-11-20 ASSESSMENT — PAIN SCALES - GENERAL: PAINLEVEL: MILD PAIN (2)

## 2023-11-20 NOTE — NURSING NOTE
"Oncology Rooming Note    November 20, 2023 1:40 PM   Alonso Rogel is a 73 year old male who presents for:    Chief Complaint   Patient presents with    Oncology Clinic Visit     Esophageal cancer     Initial Vitals: BP (!) 151/73 (BP Location: Right arm, Patient Position: Sitting, Cuff Size: Adult Large)   Pulse 73   Temp 98.2  F (36.8  C) (Oral)   Resp 20   Wt 106.4 kg (234 lb 9.6 oz)   SpO2 96%   BMI 32.26 kg/m   Estimated body mass index is 32.26 kg/m  as calculated from the following:    Height as of 10/23/23: 1.816 m (5' 11.5\").    Weight as of this encounter: 106.4 kg (234 lb 9.6 oz). Body surface area is 2.32 meters squared.  Mild Pain (2) Comment: Data Unavailable   No LMP for male patient.  Allergies reviewed: Yes  Medications reviewed: Yes    Medications: Medication refills not needed today.  Pharmacy name entered into [facility]: [id number] - [facility] [address] AT [facility] OF  [address]    Clinical concerns: elevated BP      Maegan Josue, EMT  11/20/2023              "

## 2023-11-20 NOTE — PROGRESS NOTES
THORACIC SURGERY FOLLOW UP VISIT    I saw Mr. Rogel in follow-up today. The clinical summary follows:     PREOP DIAGNOSIS   Esophageal adenocarcinoma  NEODJUVANT THERAPY   Carboplatin, paclitaxel, external beam radiotherapy (completed: 08/04/2023)    COMPLICATIONS FROM NEOADJUVANT THERAPY  None  PROCEDURE   Esophagogastroscopy  Laparoscopic gastric conduit preparation and abdominal lymphadenectomy  Botulinum toxin injection into pylorus  RIGHT thoracoscopic esophageal mobilization with mediastinal lymphadenectomy and intrathoracic anastomosis  LEFT tube thoracostomy    DATE OF PROCEDURE  10/23/2023    HISTOPATHOLOGY   No residual viable tumor cells present, tumor bed is 3.5 cm. Surgical margins negative for dysplasia, intestinal metaplasia and malignancy. Intestinal metaplasia with high-grade dysplasia within tumor bed.    COMPLICATIONS  None    INTERVAL STUDIES  Esophagram: Small focal outpouching of the contrast along the left lateral aspect of the gastric pull-through favors a diverticulum (possibly traction diverticulum) due to partial drainage of contrast back into the lumen of the pull-through, although contained perforation cannot be entirely excluded. Consider further evaluation with CT esophagram or endoscopy.    Past Medical History:   Diagnosis Date    Anemia     Esophageal cancer (H)     History of prostate cancer     History of thyroid cancer     HLD (hyperlipidemia)     Neuropathy     SNHL (sensorineural hearing loss)       Past Surgical History:   Procedure Laterality Date    APPENDECTOMY      CRANIOTOMY      ESOPHAGECTOMY MINIMALLY INVASIVE N/A 10/23/2023    Procedure: MINIMALLY INVASIVE CECIL MARY ESOPHAGECTOMY, Esophagogastroduodenoscopy, Flexible Bronchoscopy, Right Video-Assisted Thoracic Surgery, CHEST TUBE PLACEMENT X2;  Surgeon: Jennifer King MD;  [facility]    ESOPHAGOSCOPY, GASTROSCOPY, DUODENOSCOPY (EGD), COMBINED N/A 10/2/2023    Procedure: Esophagoscopy, gastroscopy,  duodenoscopy (EGD);  Surgeon: Jennifer King MD;  Location[facility] OR    LAPAROSCOPIC ASSISTED INSERTION TUBE JEJUNOSTOMY N/A 10/2/2023    Procedure: INSERTION, JEJUNOSTOMY TUBE, LAPAROSCOPY-ASSISTED;  Surgeon: Jennifer King MD;  [facility]    LAPAROSCOPY DIAGNOSTIC (GENERAL) N/A 10/2/2023    Procedure: Diagnostic Laparoscopyand Lavage;  Surgeon: Jennifer King MD;  [facility]    PROSTATECTOMY      THYROIDECTOMY, PARTIAL Left       Social History     Socioeconomic History    Marital status:      Spouse name: Not on file    Number of children: Not on file    Years of education: Not on file    Highest education level: Not on file   Occupational History    Not on file   Tobacco Use    Smoking status: Never     Passive exposure: Never    Smokeless tobacco: Never   Substance and Sexual Activity    Alcohol use: Not Currently     Comment: None since 6/23    Drug use: Never    Sexual activity: Not on file   Other Topics Concern    Not on file   Social History Narrative    Not on file     Social Determinants of Health     Financial Resource Strain: Not on file   Food Insecurity: Not on file   Transportation Needs: Not on file   Physical Activity: Not on file   Stress: Not on file   Social Connections: Not on file   Interpersonal Safety: Not on file   Housing Stability: Not on file      SUBJECTIVE   Alonso is doing ok. He is having a hard time getting his appetite to return because he feels so full from the tube feedings. He does 4 cartons a day plus 2 protein packs in the morning and another 2 protein packs in the evening. He has been trying to increase his oral intake but it is hard when he feels so full all the time. He is tolerating foods like scrambled eggs and oatmeal. He is wondering whether he will be able to decrease the amount of tube feedings he is doing so that he can get more in orally. He has still been taking his medications via the feeding tube.    OBJECTIVE  BP (!) 151/73 (BP Location: Right  arm, Patient Position: Sitting, Cuff Size: Adult Large)   Pulse 73   Temp 98.2  F (36.8  C) (Oral)   Resp 20   Wt 106.4 kg (234 lb 9.6 oz)   SpO2 96%   BMI 32.26 kg/m       From a personal perspective, he is here with his wife, Anna Marie.    Alonso will continue to slowly advance his diet within the context of soft mechanical foods. He will continue to take his medications via the feeding tube until he feels more confident advancing his diet. I will have him reduce his tube feedings to 2 cartons a day. We will also decrease his protein supplements to 1 in the morning and 1 in the evening. I will have our nurse, Jessica, give him a call in a week to see how he has tolerated this change. If he is tolerating this and not losing weight we will discontinue his tube feeding altogether. Once he is able to maintain his nutrition for 2 consecutive weeks without tube feedings, we can remove his feeding tube.    IMPRESSION   73 year-old male status post minimally invasive esophagectomy for a T3N1M0 Siewert I esophageal cancer.      PLAN  I spent 25 min on the date of the encounter in chart review, patient visit, review of tests, documentation and/or discussion with other providers about the issues documented above. I reviewed the plan as follows:  Soft mechanical diet. Decrease tube feedings to 2 cartons a day. Decrease protein supplements to one in the morning and one in the evening.   All questions were answered and the patient and present family were in agreement with the plan.  I appreciate the opportunity to participate in the care of your patient and will keep you updated.  Sincerely,

## 2023-11-28 ENCOUNTER — PATIENT OUTREACH (OUTPATIENT)
Dept: SURGERY | Facility: CLINIC | Age: 73
End: 2023-11-28
Payer: COMMERCIAL

## 2023-11-28 DIAGNOSIS — C15.4 MALIGNANT NEOPLASM OF MIDDLE THIRD OF ESOPHAGUS (H): Primary | ICD-10-CM

## 2023-11-28 NOTE — PROGRESS NOTES
[facility]: Cancer Care                                                                                          Per request from LATISHA Luna a call was placed to pt to check in on how he is doing with oral intake. Pt reports he thinks he is doing well, having several small meals throughout the day. He states that he is motivated to maintain weight for two consecutive weeks in order to have tube removed. Last tube feed done Sunday evening.    He states his dry cough has improved but we reviewed he can take an OTC cough suppressant and should work on hydration, humidification of air.    He agreed to check in again at the end of the week. APPLE updated.    Jessica Ku, RN BSN  Thoracic Surgery RN Care Coordinator  [phone]

## 2023-12-08 ENCOUNTER — PATIENT OUTREACH (OUTPATIENT)
Dept: SURGERY | Facility: CLINIC | Age: 73
End: 2023-12-08
Payer: COMMERCIAL

## 2023-12-08 DIAGNOSIS — R05.1 ACUTE COUGH: Primary | ICD-10-CM

## 2023-12-08 DIAGNOSIS — C15.4 MALIGNANT NEOPLASM OF MIDDLE THIRD OF ESOPHAGUS (H): Primary | ICD-10-CM

## 2023-12-08 RX ORDER — BENZONATATE 100 MG/1
100 CAPSULE ORAL 3 TIMES DAILY PRN
Qty: 30 CAPSULE | Refills: 1 | Status: SHIPPED | OUTPATIENT
Start: 2023-12-08

## 2023-12-08 NOTE — PROGRESS NOTES
[facility]: Cancer Care                                                                                            Called pt to follow up on [employer] message regarding cough. Pt reports dry cough, worse at night, does not cough when eating or drinking, seems to be more related to laying down. Huddled with Inocencia GODO and plan to send Rx for tessalon pearls. Reviewed he cannot crush this medication, he was fairly confident he could swallow a small capsule.    We discussed that his weight is currently fluctuating between 215-217 lbs on home scale and that he should be able to maintain or gain for 2-3 weeks with only oral intake before consideration for removing j tube. Pt intake only PO now, he focuses on protein rich, higher calorie foods.     Pt voiced understanding and agreed to check in with RNCC next week.     Jessica Ku, RN BSN  Thoracic Surgery RN Care Coordinator  [phone]

## 2023-12-14 DIAGNOSIS — C15.4 MALIGNANT NEOPLASM OF MIDDLE THIRD OF ESOPHAGUS (H): Primary | ICD-10-CM

## 2023-12-20 DIAGNOSIS — C15.4 MALIGNANT NEOPLASM OF MIDDLE THIRD OF ESOPHAGUS (H): Primary | ICD-10-CM

## 2023-12-20 NOTE — PROGRESS NOTES
[facility]: Cancer Care                                                                                          Writer received call from CATRINA Apple with [facility]. The following concerns were discussed:    -Verbal order given for Triad paste BID PRN for excoriation around J tube site    -Pt c/o reflux symptoms keeping him up at night despite eating small frequent meals and using wedge pillow. Rx for liquid omeprazole was not covered by insurance and ended on 11/30. He is not currently taking any medications for reflux. New Rx for omeprazole 20mg every day sent to pharmacy. He is tolerating swallowing pills.    -Referral to dietary placed    -Reviewed POC including if pt is able to maintain weight, we can remove JT in clinic on 1/11.     Ambika stated she will relay this information to patient today.    Jessica Ku RN BSN  Thoracic Surgery RN Care Coordinator  [phone]

## 2023-12-21 DIAGNOSIS — C15.5 MALIGNANT NEOPLASM OF LOWER THIRD OF ESOPHAGUS (H): ICD-10-CM

## 2023-12-21 DIAGNOSIS — C15.4 MALIGNANT NEOPLASM OF MIDDLE THIRD OF ESOPHAGUS (H): Primary | ICD-10-CM

## 2023-12-21 DIAGNOSIS — E78.5 HYPERLIPIDEMIA, UNSPECIFIED HYPERLIPIDEMIA TYPE: Chronic | ICD-10-CM

## 2023-12-29 ENCOUNTER — VIRTUAL VISIT (OUTPATIENT)
Dept: ONCOLOGY | Facility: CLINIC | Age: 73
End: 2023-12-29
Attending: CLINICAL NURSE SPECIALIST
Payer: COMMERCIAL

## 2023-12-29 DIAGNOSIS — C15.4 MALIGNANT NEOPLASM OF MIDDLE THIRD OF ESOPHAGUS (H): Primary | ICD-10-CM

## 2023-12-29 DIAGNOSIS — C15.5 MALIGNANT NEOPLASM OF LOWER THIRD OF ESOPHAGUS (H): ICD-10-CM

## 2023-12-29 DIAGNOSIS — E78.5 HYPERLIPIDEMIA, UNSPECIFIED HYPERLIPIDEMIA TYPE: ICD-10-CM

## 2023-12-29 PROCEDURE — 97802 MEDICAL NUTRITION INDIV IN: CPT | Mod: TEL,95 | Performed by: DIETITIAN, REGISTERED

## 2023-12-29 NOTE — LETTER
12/29/2023         RE: Alonso Rogel  [address] [facility] [address]        Dear Colleague,    Thank you for referring your patient, Alonso Rogel, to the [facility]. Please see a copy of my visit note below.      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)   C15.5 (ICD-10-CM) - Malignant neoplasm of lower third of esophagus (H)   E78.5 (ICD-10-CM) - Hyperlipidemia, unspecified hyperlipidemia type   Additional Information:  S/P esophagectomy, referral to discuss nutrition for maintaining weight, healing from surgery, reflux symptoms      NUTRITION HISTORY  Factors affecting nutrition intake include:early satiety  Current diet/appetite: soft foods, small bites  Surgery history:   Past Surgical History:   Procedure Laterality Date    APPENDECTOMY      CRANIOTOMY      ESOPHAGECTOMY MINIMALLY INVASIVE N/A 10/23/2023    Procedure: MINIMALLY INVASIVE CECIL MARY ESOPHAGECTOMY, Esophagogastroduodenoscopy, Flexible Bronchoscopy, Right Video-Assisted Thoracic Surgery, CHEST TUBE PLACEMENT X2;  Surgeon: Jennifer King MD;  [facility]    ESOPHAGOSCOPY, GASTROSCOPY, DUODENOSCOPY (EGD), COMBINED N/A 10/2/2023    Procedure: Esophagoscopy, gastroscopy, duodenoscopy (EGD);  Surgeon: Jennifer King MD;  [facility]    LAPAROSCOPIC ASSISTED INSERTION TUBE JEJUNOSTOMY N/A 10/2/2023    Procedure: INSERTION, JEJUNOSTOMY TUBE, LAPAROSCOPY-ASSISTED;  Surgeon: Jennifer King MD;  [facility]    LAPAROSCOPY DIAGNOSTIC (GENERAL) N/A 10/2/2023    Procedure: Diagnostic Laparoscopyand Lavage;  Surgeon: Jennifer King MD;  [facility]    PROSTATECTOMY      THYROIDECTOMY, PARTIAL Left        PEG tube: J tube feedings -  Followed by I RD, Margarita Agustin; has not used his FT in over 3 weeks.   He is taking 2 packets of pro source TF    Alonso has been reliant on PO intake for the past 3 weeks now.  He has been having some fullness with trying to strive for frequent meals.   He is pleased that his taste sensation is coming back. He denies dysphagia or foods getting stuck.  He is cautious with small bites and chewing well.   He has been eating fish and chicken and starting to crave red meat.   He is eating a variety of other protein sources including eggs, dairy, protein bars and protein shakes.    He has been tracking his calories and feels like he's at his baseline intake as his weight has stabilized.   He would like to keep his weight stable or even gain another 5-10 lbs from his baseline reported weight of 212 lbs.        Diet Recall  Breakfast 1 bowl of cereal w/ whole or 2% milk OR cinnamon roll, coffee  Mid am - scrambled egg w/ cottage cheese and cheese, English muffin   Lunch 1/2 [place] chicken wrap  Mid pm - Boost Plus   Dinner [place], cottage cheese OR 3 oz chicken breast  HS - dish of ice cream  or energy bar   Snacks Boost Plus, yogurt   Beverages Water and apple juice      Patient Medical History  Past Medical History:   Diagnosis Date    Anemia     Esophageal cancer (H)     History of prostate cancer     History of thyroid cancer     HLD (hyperlipidemia)     Neuropathy     SNHL (sensorineural hearing loss)        Current Medications  Current Outpatient Medications:     acetaminophen (TYLENOL) 325 MG tablet, Take 2 tablets (650 mg) by mouth every 4 hours as needed for mild pain, Disp: 50 tablet, Rfl: 0    amiodarone (PACERONE) 200 MG tablet, 1 tablet (200 mg) by Oral or FT or NG tube route 2 times daily for 2 days, THEN 1 tablet (200 mg) daily for 21 days., Disp: 16 tablet, Rfl: 0    benzonatate (TESSALON) 100 MG capsule, Take 1 capsule (100 mg) by mouth 3 times daily as needed for cough, Disp: 30 capsule, Rfl: 1    docusate  sodium (COLACE) 250 MG capsule, Take 250 mg by mouth 2 times daily, Disp: , Rfl:     doxazosin (CARDURA) 0.6 mg/mL SUSP suspension, 1.67 mLs (1 mg) by Per J Tube route daily for 14 days For urinary retention, Disp: 23.38 mL, Rfl: 0    ibuprofen (ADVIL/MOTRIN) 100 MG/5ML suspension, 20 mLs (400 mg) by Oral or Feeding Tube route every 6 hours, Disp: , Rfl:     Lidocaine (LIDOCARE) 4 % Patch, Place 1 patch onto the skin every 24 hours To prevent lidocaine toxicity, patient should be patch free for 12 hrs daily. (Patient not taking: Reported on 11/20/2023), Disp: , Rfl:     methocarbamol 1000 MG TABS, 1,000 mg by Oral or Feeding Tube route every 6 hours as needed for muscle spasms (Patient not taking: Reported on 11/20/2023), Disp: 20 tablet, Rfl: 0    nystatin (MYCOSTATIN) 677115 UNIT/ML suspension, SHAKE LIQUID AND TAKE 10 ML BY MOUTH FOUR TIMES DAILY BEFORE MEALS AND AT BEDTIME, Disp: , Rfl:     omeprazole (PRILOSEC) 20 MG DR capsule, Take 1 capsule (20 mg) by mouth daily, Disp: 30 capsule, Rfl: 3    oxyCODONE (ROXICODONE) 5 MG/5ML solution, 5-10 mLs (5-10 mg) by Per J Tube route every 6 hours (Patient not taking: Reported on 11/20/2023), Disp: 350 mL, Rfl: 0    protein modular (PROSOURCE TF20) LIQD packet, 1 packet by Per Feeding Tube route 2 times daily, Disp: , Rfl:     senna-docusate (SENOKOT-S/PERICOLACE) 8.6-50 MG tablet, Take 1-2 tablets by mouth 2 times daily Reduce dose or temporarily discontinue if having loose stools., Disp: 40 tablet, Rfl: 0    simvastatin (ZOCOR) 80 MG tablet, Take 80 mg by mouth At Bedtime, Disp: , Rfl:     triamcinolone (KENALOG) 0.1 % external cream, Apply 1 Application topically daily as needed for Other., Disp: , Rfl:   Medications have been reviewed.    Pertinent lab results:  Prealbumin   Date Value Ref Range Status   10/17/2023 34 15 - 45 mg/dL Final     Labs:  Electrolytes  Potassium (mmol/L)   Date Value   11/01/2023 3.9   10/31/2023 4.0   10/30/2023 3.9     Potassium POCT  "(mmol/L)   Date Value   10/23/2023 4.4   10/23/2023 4.5   10/23/2023 4.3     Phosphorus (mg/dL)   Date Value   11/01/2023 3.5   10/31/2023 3.9   10/30/2023 4.4   10/29/2023 3.8   10/28/2023 3.2    Blood Glucose  Glucose (mg/dL)   Date Value   11/01/2023 128 (H)   10/31/2023 151 (H)   10/30/2023 127 (H)   10/29/2023 144 (H)   10/28/2023 131 (H)     GLUCOSE BY METER POCT (mg/dL)   Date Value   10/25/2023 138 (H)   10/25/2023 139 (H)   10/23/2023 177 (H)     Glucose Whole Blood POCT (mg/dL)   Date Value   10/23/2023 178 (H)   10/23/2023 179 (H)   10/23/2023 182 (H)   10/23/2023 200 (H)   10/23/2023 176 (H)    Inflammatory Markers  WBC Count (10e3/uL)   Date Value   11/01/2023 4.8   10/31/2023 6.3   10/30/2023 7.2     Albumin (g/dL)   Date Value   10/17/2023 4.3      Magnesium (mg/dL)   Date Value   10/30/2023 2.3   10/29/2023 2.1   10/28/2023 2.2     Sodium (mmol/L)   Date Value   11/01/2023 142   10/31/2023 146 (H)   10/30/2023 145    Renal  Urea Nitrogen (mg/dL)   Date Value   11/01/2023 29.3 (H)   10/31/2023 31.8 (H)   10/30/2023 30.0 (H)     Creatinine (mg/dL)   Date Value   11/01/2023 0.92   10/31/2023 0.93   10/30/2023 0.94     Additional  No results found for: \"TRIG\", \"URINEKETONE\"       ANTHROPOMETRICS  Height: 5'11\"  Weight: 234 lbs/106kg  BMI: 32  Weight Status:  Obesity Grade I BMI 30-34.9  IBW: 172 lbs (136%)  Weight History: Current wt per patient report: 212 lbs  Wt Readings from Last 10 Encounters:   11/20/23 106.4 kg (234 lb 9.6 oz)   11/01/23 106.2 kg (234 lb 2.1 oz)   10/17/23 106.6 kg (235 lb)   10/02/23 106.1 kg (233 lb 14.5 oz)   09/28/23 107.4 kg (236 lb 11.2 oz)   09/18/23 107.1 kg (236 lb 3.2 oz)   06/22/23 110.5 kg (243 lb 9.6 oz)     Dosing Weight: 85kg    Medications/vitamins/minerals/herbals:   Reviewed - used to take a MVI prior to cancer/cancer treatment.     Labs:   Labs reviewed    NUTRITION FOCUSED PHYSICAL ASSESSMENT FOR DIAGNOSING MALNUTRITION:  Consult for education " only      ASSESSED NUTRITION NEEDS:  Estimated Energy Needs: 9334-9513 kcals (25-30 Kcal/Kg)  Justification: maintenance  Estimated Protein Needs: 100-125 grams protein (1.2-1.5 g pro/Kg)  Justification: post op, healing  Estimated Fluid Needs: 2200  mL   Justification: maintenance    MALNUTRITION:  % Weight Loss:  Weight loss does not meet criteria for malnutrition   % Intake:  Decreased intake does not meet criteria for malnutrition   Subcutaneous Fat Loss:  None observed  Muscle Loss:  None observed  Fluid Retention:  None noted    Malnutrition Diagnosis: Unable to determine due to No NFPA obtained    NUTRITION DIAGNOSIS:  Food and nutrition-related knowledge deficit related to nutrition needs post surgery and post TF as evidenced by     INTERVENTIONS  Provided written & verbal education:     - Reviewed nutrition and hydration needs. Encouraged pt to aim for at least 2200kcal and 80-100g protein, 6-8 cups non-caffeine containing beverages (water/electrolytes) daily.    - Discussed strategies to help fortify meals and snacks. Encouraged to focus on small, frequent meals.    - Reviewed sources of protein. Encouraged to have a protein source with each meal and snack.    -Reviewed Oral Nutrition shake options:   [email or web address] Milk: regular whole milk 150 jojo, 13g protein (red jug)  [email or web address] Nutrition Plan shakes: 150 jojo, 30g protein  Boost Very High Calorie (VHC): 8 oz 530 calories, 22g protein  Boost Plus:  8 oz 350 calories, 15g protein  Ensure Plus: 8 oz 350 calories, 15g protein  Ensure Complete: 11 oz 350 calories, 30g protein  Encouraged utilizing these ONS in home made shakes/smoothies to prevent flavor fatigue.    - Discussed vitamins and minerals. Suggested restarting his general one a day MVI.       Provided pt with corresponding education materials/handouts on:  --High calorie/high protein recipes  --High calorie/high protein beverages  --Additional High calorie/high protein recipes  --Tips for increasing  calories in your diet  --Sources of protein  --Thrive recipes    Pt verbalize understanding of materials provided during consult.   Patient Understanding: good  Expected patient engagement: good     Implementation  Collaboration and Referral of Nutrition care - message sent to [facility] regarding discontinuation of EN and PO progression.     Goals  1.  Aim for 5-6 small frequent meals  2.  Aim for 2200kcal and 80-100g protein, 6 cups fluids per day  3. Weight maintenance     Follow-Up Plans: Pt has RD contact information for questions.      Karlene Pina RD, , LD  [facility]  [phone]

## 2023-12-29 NOTE — PROGRESS NOTES
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)   C15.5 (ICD-10-CM) - Malignant neoplasm of lower third of esophagus (H)   E78.5 (ICD-10-CM) - Hyperlipidemia, unspecified hyperlipidemia type   Additional Information:  S/P esophagectomy, referral to discuss nutrition for maintaining weight, healing from surgery, reflux symptoms      NUTRITION HISTORY  Factors affecting nutrition intake include:early satiety  Current diet/appetite: soft foods, small bites  Surgery history:   Past Surgical History:   Procedure Laterality Date    APPENDECTOMY      CRANIOTOMY      ESOPHAGECTOMY MINIMALLY INVASIVE N/A 10/23/2023    Procedure: MINIMALLY INVASIVE CECIL MARY ESOPHAGECTOMY, Esophagogastroduodenoscopy, Flexible Bronchoscopy, Right Video-Assisted Thoracic Surgery, CHEST TUBE PLACEMENT X2;  Surgeon: Jennifer King MD;  [facility]    ESOPHAGOSCOPY, GASTROSCOPY, DUODENOSCOPY (EGD), COMBINED N/A 10/2/2023    Procedure: Esophagoscopy, gastroscopy, duodenoscopy (EGD);  Surgeon: Jennifer King MD;  [facility]    LAPAROSCOPIC ASSISTED INSERTION TUBE JEJUNOSTOMY N/A 10/2/2023    Procedure: INSERTION, JEJUNOSTOMY TUBE, LAPAROSCOPY-ASSISTED;  Surgeon: Jennifer King MD;  [facility]    LAPAROSCOPY DIAGNOSTIC (GENERAL) N/A 10/2/2023    Procedure: Diagnostic Laparoscopyand Lavage;  Surgeon: Jennifer King MD;  [facility]    PROSTATECTOMY      THYROIDECTOMY, PARTIAL Left        PEG tube: J tube feedings - Followed by [facility] RD, Margarita Agustin; has not used his FT in over 3 weeks.   He is taking 2 packets of pro source TF    Alonso has been reliant on PO intake for the past 3 weeks now.  He has been having some fullness with trying to strive for frequent meals.   He  is pleased that his taste sensation is coming back. He denies dysphagia or foods getting stuck.  He is cautious with small bites and chewing well.   He has been eating fish and chicken and starting to crave red meat.   He is eating a variety of other protein sources including eggs, dairy, protein bars and protein shakes.    He has been tracking his calories and feels like he's at his baseline intake as his weight has stabilized.   He would like to keep his weight stable or even gain another 5-10 lbs from his baseline reported weight of 212 lbs.        Diet Recall  Breakfast 1 bowl of cereal w/ whole or 2% milk OR cinnamon roll, coffee  Mid am - scrambled egg w/ cottage cheese and cheese, English muffin   Lunch 1/2 Mauritian chicken wrap  Mid pm - Boost Plus   Dinner Eldred, cottage cheese OR 3 oz chicken breast  HS - dish of ice cream  or energy bar   Snacks Boost Plus, yogurt   Beverages Water and apple juice      Patient Medical History  Past Medical History:   Diagnosis Date    Anemia     Esophageal cancer (H)     History of prostate cancer     History of thyroid cancer     HLD (hyperlipidemia)     Neuropathy     SNHL (sensorineural hearing loss)        Current Medications  Current Outpatient Medications:     acetaminophen (TYLENOL) 325 MG tablet, Take 2 tablets (650 mg) by mouth every 4 hours as needed for mild pain, Disp: 50 tablet, Rfl: 0    amiodarone (PACERONE) 200 MG tablet, 1 tablet (200 mg) by Oral or FT or NG tube route 2 times daily for 2 days, THEN 1 tablet (200 mg) daily for 21 days., Disp: 16 tablet, Rfl: 0    benzonatate (TESSALON) 100 MG capsule, Take 1 capsule (100 mg) by mouth 3 times daily as needed for cough, Disp: 30 capsule, Rfl: 1    docusate sodium (COLACE) 250 MG capsule, Take 250 mg by mouth 2 times daily, Disp: , Rfl:     doxazosin (CARDURA) 0.6 mg/mL SUSP suspension, 1.67 mLs (1 mg) by Per J Tube route daily for 14 days For urinary retention, Disp: 23.38 mL, Rfl: 0    ibuprofen  (ADVIL/MOTRIN) 100 MG/5ML suspension, 20 mLs (400 mg) by Oral or Feeding Tube route every 6 hours, Disp: , Rfl:     Lidocaine (LIDOCARE) 4 % Patch, Place 1 patch onto the skin every 24 hours To prevent lidocaine toxicity, patient should be patch free for 12 hrs daily. (Patient not taking: Reported on 11/20/2023), Disp: , Rfl:     methocarbamol 1000 MG TABS, 1,000 mg by Oral or Feeding Tube route every 6 hours as needed for muscle spasms (Patient not taking: Reported on 11/20/2023), Disp: 20 tablet, Rfl: 0    nystatin (MYCOSTATIN) 343740 UNIT/ML suspension, SHAKE LIQUID AND TAKE 10 ML BY MOUTH FOUR TIMES DAILY BEFORE MEALS AND AT BEDTIME, Disp: , Rfl:     omeprazole (PRILOSEC) 20 MG DR capsule, Take 1 capsule (20 mg) by mouth daily, Disp: 30 capsule, Rfl: 3    oxyCODONE (ROXICODONE) 5 MG/5ML solution, 5-10 mLs (5-10 mg) by Per J Tube route every 6 hours (Patient not taking: Reported on 11/20/2023), Disp: 350 mL, Rfl: 0    protein modular (PROSOURCE TF20) LIQD packet, 1 packet by Per Feeding Tube route 2 times daily, Disp: , Rfl:     senna-docusate (SENOKOT-S/PERICOLACE) 8.6-50 MG tablet, Take 1-2 tablets by mouth 2 times daily Reduce dose or temporarily discontinue if having loose stools., Disp: 40 tablet, Rfl: 0    simvastatin (ZOCOR) 80 MG tablet, Take 80 mg by mouth At Bedtime, Disp: , Rfl:     triamcinolone (KENALOG) 0.1 % external cream, Apply 1 Application topically daily as needed for Other., Disp: , Rfl:   Medications have been reviewed.    Pertinent lab results:  Prealbumin   Date Value Ref Range Status   10/17/2023 34 15 - 45 mg/dL Final     Labs:  Electrolytes  Potassium (mmol/L)   Date Value   11/01/2023 3.9   10/31/2023 4.0   10/30/2023 3.9     Potassium POCT (mmol/L)   Date Value   10/23/2023 4.4   10/23/2023 4.5   10/23/2023 4.3     Phosphorus (mg/dL)   Date Value   11/01/2023 3.5   10/31/2023 3.9   10/30/2023 4.4   10/29/2023 3.8   10/28/2023 3.2    Blood Glucose  Glucose (mg/dL)   Date Value  "  11/01/2023 128 (H)   10/31/2023 151 (H)   10/30/2023 127 (H)   10/29/2023 144 (H)   10/28/2023 131 (H)     GLUCOSE BY METER POCT (mg/dL)   Date Value   10/25/2023 138 (H)   10/25/2023 139 (H)   10/23/2023 177 (H)     Glucose Whole Blood POCT (mg/dL)   Date Value   10/23/2023 178 (H)   10/23/2023 179 (H)   10/23/2023 182 (H)   10/23/2023 200 (H)   10/23/2023 176 (H)    Inflammatory Markers  WBC Count (10e3/uL)   Date Value   11/01/2023 4.8   10/31/2023 6.3   10/30/2023 7.2     Albumin (g/dL)   Date Value   10/17/2023 4.3      Magnesium (mg/dL)   Date Value   10/30/2023 2.3   10/29/2023 2.1   10/28/2023 2.2     Sodium (mmol/L)   Date Value   11/01/2023 142   10/31/2023 146 (H)   10/30/2023 145    Renal  Urea Nitrogen (mg/dL)   Date Value   11/01/2023 29.3 (H)   10/31/2023 31.8 (H)   10/30/2023 30.0 (H)     Creatinine (mg/dL)   Date Value   11/01/2023 0.92   10/31/2023 0.93   10/30/2023 0.94     Additional  No results found for: \"TRIG\", \"URINEKETONE\"       ANTHROPOMETRICS  Height: 5'11\"  Weight: 234 lbs/106kg  BMI: 32  Weight Status:  Obesity Grade I BMI 30-34.9  IBW: 172 lbs (136%)  Weight History: Current wt per patient report: 212 lbs  Wt Readings from Last 10 Encounters:   11/20/23 106.4 kg (234 lb 9.6 oz)   11/01/23 106.2 kg (234 lb 2.1 oz)   10/17/23 106.6 kg (235 lb)   10/02/23 106.1 kg (233 lb 14.5 oz)   09/28/23 107.4 kg (236 lb 11.2 oz)   09/18/23 107.1 kg (236 lb 3.2 oz)   06/22/23 110.5 kg (243 lb 9.6 oz)     Dosing Weight: 85kg    Medications/vitamins/minerals/herbals:   Reviewed - used to take a MVI prior to cancer/cancer treatment.     Labs:   Labs reviewed    NUTRITION FOCUSED PHYSICAL ASSESSMENT FOR DIAGNOSING MALNUTRITION:  Consult for education only      ASSESSED NUTRITION NEEDS:  Estimated Energy Needs: 3147-0011 kcals (25-30 Kcal/Kg)  Justification: maintenance  Estimated Protein Needs: 100-125 grams protein (1.2-1.5 g pro/Kg)  Justification: post op, healing  Estimated Fluid Needs: 2200  mL "   Justification: maintenance    MALNUTRITION:  % Weight Loss:  Weight loss does not meet criteria for malnutrition   % Intake:  Decreased intake does not meet criteria for malnutrition   Subcutaneous Fat Loss:  None observed  Muscle Loss:  None observed  Fluid Retention:  None noted    Malnutrition Diagnosis: Unable to determine due to No NFPA obtained    NUTRITION DIAGNOSIS:  Food and nutrition-related knowledge deficit related to nutrition needs post surgery and post TF as evidenced by     INTERVENTIONS  Provided written & verbal education:     - Reviewed nutrition and hydration needs. Encouraged pt to aim for at least 2200kcal and 80-100g protein, 6-8 cups non-caffeine containing beverages (water/electrolytes) daily.    - Discussed strategies to help fortify meals and snacks. Encouraged to focus on small, frequent meals.    - Reviewed sources of protein. Encouraged to have a protein source with each meal and snack.    -Reviewed Oral Nutrition shake options:   Hark Milk: regular whole milk 150 jojo, 13g protein (red jug)  Hark Nutrition Plan shakes: 150 jojo, 30g protein  Boost Very High Calorie (VHC): 8 oz 530 calories, 22g protein  Boost Plus:  8 oz 350 calories, 15g protein  Ensure Plus: 8 oz 350 calories, 15g protein  Ensure Complete: 11 oz 350 calories, 30g protein  Encouraged utilizing these ONS in home made shakes/smoothies to prevent flavor fatigue.    - Discussed vitamins and minerals. Suggested restarting his general one a day MVI.       Provided pt with corresponding education materials/handouts on:  --High calorie/high protein recipes  --High calorie/high protein beverages  --Additional High calorie/high protein recipes  --Tips for increasing calories in your diet  --Sources of protein  --Thrive recipes    Pt verbalize understanding of materials provided during consult.   Patient Understanding: good  Expected patient engagement: good     Implementation  Collaboration and Referral of Nutrition  care - message sent to [facility] regarding discontinuation of EN and PO progression.     Goals  1.  Aim for 5-6 small frequent meals  2.  Aim for 2200kcal and 80-100g protein, 6 cups fluids per day  3. Weight maintenance     Follow-Up Plans: Pt has RD contact information for questions.      Karlene Pina RD, , LD  [facility]  [phone]

## 2023-12-29 NOTE — PATIENT INSTRUCTIONS
Homer Gupta & Anna Marie,    I have attached some resources and some recipes for high calorie/high protein, pureed food items (see your email):  --High calorie/high protein recipes  --High calorie/high protein beverages  --Additional High calorie/high protein recipes  --Tips for increasing calories in your diet  --Sources of protein  --Thrive recipes    Here are some shake ideas:  Holograam Milk: regular whole milk 150 jojo, 13g protein (red jug)  Fairlife Nutrition Plan shakes: 150 jojo, 30g protein  Dante Breakfast Essential packets - add to milk  Naked Mass powder 24 oz water or milk wtih 4 scoops powder = 1250 jojo, 50g protein   Boost Very High Calorie (VHC): 8 oz 530 calories, 22g protein  Boost Plus:  8 oz 350 calories, 15g protein  Ensure Plus: 8 oz 350 calories, 15g protein  Ensure Complete: 11 oz 350 calories, 30g protein    Here are your nutrition goals:  --Calories: 5644-0017/day  --Protein: 100-125 grams/day  --Fluids: 6 cups/day (water/electrolytes)     Here is the link to the for the Dante Breakfast:  Nutritional Drinks & Powder Mixes  Dante Breakfast Essentials   Home  With essential nutrients &amp; a kid-approved taste, our quick &amp; easy Dante Breakfast Essentials  nutritional drinks &amp; powder mixes help start their day.  [email or web address]  Here is the link to the Holograam products:  [employer] Ultra-Filtered Milk  Lactose-Free Milk    Please reach out to me with further questions.     Be well,     Nathan Pina RD, , LD  Board Certified Specialist in Oncology Nutrition  [facility]  Oncology Services  [email or web address]   Office: [phone]  Fax: [phone]

## 2024-01-11 ENCOUNTER — APPOINTMENT (OUTPATIENT)
Dept: LAB | Facility: CLINIC | Age: 74
End: 2024-01-11
Attending: STUDENT IN AN ORGANIZED HEALTH CARE EDUCATION/TRAINING PROGRAM
Payer: COMMERCIAL

## 2024-01-11 ENCOUNTER — ONCOLOGY VISIT (OUTPATIENT)
Dept: SURGERY | Facility: CLINIC | Age: 74
End: 2024-01-11
Attending: STUDENT IN AN ORGANIZED HEALTH CARE EDUCATION/TRAINING PROGRAM
Payer: COMMERCIAL

## 2024-01-11 VITALS
WEIGHT: 216.8 LBS | HEART RATE: 62 BPM | RESPIRATION RATE: 16 BRPM | DIASTOLIC BLOOD PRESSURE: 75 MMHG | SYSTOLIC BLOOD PRESSURE: 135 MMHG | TEMPERATURE: 98.1 F | OXYGEN SATURATION: 99 % | BODY MASS INDEX: 29.82 KG/M2

## 2024-01-11 DIAGNOSIS — C15.4 MALIGNANT NEOPLASM OF MIDDLE THIRD OF ESOPHAGUS (H): ICD-10-CM

## 2024-01-11 LAB
ALBUMIN SERPL BCG-MCNC: 3.7 G/DL (ref 3.5–5.2)
PREALB SERPL-MCNC: 28 MG/DL (ref 20–40)

## 2024-01-11 PROCEDURE — G0463 HOSPITAL OUTPT CLINIC VISIT: HCPCS | Performed by: STUDENT IN AN ORGANIZED HEALTH CARE EDUCATION/TRAINING PROGRAM

## 2024-01-11 PROCEDURE — 36591 DRAW BLOOD OFF VENOUS DEVICE: CPT | Performed by: STUDENT IN AN ORGANIZED HEALTH CARE EDUCATION/TRAINING PROGRAM

## 2024-01-11 PROCEDURE — 84134 ASSAY OF PREALBUMIN: CPT | Performed by: STUDENT IN AN ORGANIZED HEALTH CARE EDUCATION/TRAINING PROGRAM

## 2024-01-11 PROCEDURE — 250N000011 HC RX IP 250 OP 636: Performed by: STUDENT IN AN ORGANIZED HEALTH CARE EDUCATION/TRAINING PROGRAM

## 2024-01-11 PROCEDURE — 82040 ASSAY OF SERUM ALBUMIN: CPT | Performed by: STUDENT IN AN ORGANIZED HEALTH CARE EDUCATION/TRAINING PROGRAM

## 2024-01-11 PROCEDURE — 99024 POSTOP FOLLOW-UP VISIT: CPT | Performed by: STUDENT IN AN ORGANIZED HEALTH CARE EDUCATION/TRAINING PROGRAM

## 2024-01-11 RX ORDER — HEPARIN SODIUM (PORCINE) LOCK FLUSH IV SOLN 100 UNIT/ML 100 UNIT/ML
500 SOLUTION INTRAVENOUS ONCE
Status: COMPLETED | OUTPATIENT
Start: 2024-01-11 | End: 2024-01-11

## 2024-01-11 RX ORDER — PANTOPRAZOLE SODIUM 40 MG/1
40 TABLET, DELAYED RELEASE ORAL DAILY
COMMUNITY
Start: 2024-01-09 | End: 2024-04-08

## 2024-01-11 RX ADMIN — Medication 500 UNITS: at 10:01

## 2024-01-11 ASSESSMENT — PAIN SCALES - GENERAL: PAINLEVEL: NO PAIN (0)

## 2024-01-11 NOTE — NURSING NOTE
"Chief Complaint   Patient presents with    Port Draw     Labs drawn via port by RN in lab.  VS taken     Port accessed with 20 gauge 3/4\" gripper needle by RN, labs collected, line flushed with saline and heparin, then de-accessed.  Vitals taken. Pt checked in for appointment(s).    Lilibeth Horvath RN    "

## 2024-01-11 NOTE — LETTER
1/11/2024         RE: Alonso Rogel  [id number] [facility] [id number]        Dear Colleague,    Thank you for referring your patient, Alonso Rogel, to the [facility]. Please see a copy of my visit note below.        THORACIC SURGERY FOLLOW UP VISIT     I saw Mr. Rogel in follow-up today. The clinical summary follows:     PREOP DIAGNOSIS   Esophageal adenocarcinoma  NEODJUVANT THERAPY   Carboplatin, paclitaxel, external beam radiotherapy (completed: 08/04/2023)     COMPLICATIONS FROM NEOADJUVANT THERAPY  None  PROCEDURE   Esophagogastroscopy  Laparoscopic gastric conduit preparation and abdominal lymphadenectomy  Botulinum toxin injection into pylorus  RIGHT thoracoscopic esophageal mobilization with mediastinal lymphadenectomy and intrathoracic anastomosis  LEFT tube thoracostomy     DATE OF PROCEDURE  10/23/2023     HISTOPATHOLOGY   No residual viable tumor cells present, tumor bed is 3.5 cm. Surgical margins negative for dysplasia, intestinal metaplasia and malignancy. Intestinal metaplasia with high-grade dysplasia within tumor bed.     COMPLICATIONS  None          Past Medical History        Past Medical History:   Diagnosis Date    Anemia      Esophageal cancer (H)      History of prostate cancer      History of thyroid cancer      HLD (hyperlipidemia)      Neuropathy      SNHL (sensorineural hearing loss)           Past Surgical History         Past Surgical History:   Procedure Laterality Date    APPENDECTOMY        CRANIOTOMY        ESOPHAGECTOMY MINIMALLY INVASIVE N/A 10/23/2023     Procedure: MINIMALLY INVASIVE CECIL MARY ESOPHAGECTOMY, Esophagogastroduodenoscopy, Flexible Bronchoscopy, Right Video-Assisted Thoracic Surgery, CHEST TUBE PLACEMENT X2;  Surgeon: Jennifer King MD;  [facility]    ESOPHAGOSCOPY, GASTROSCOPY, DUODENOSCOPY (EGD), COMBINED N/A 10/2/2023     Procedure: Esophagoscopy, gastroscopy, duodenoscopy (EGD);  Surgeon: Jennifer King MD;   [facility]    LAPAROSCOPIC ASSISTED INSERTION TUBE JEJUNOSTOMY N/A 10/2/2023     Procedure: INSERTION, JEJUNOSTOMY TUBE, LAPAROSCOPY-ASSISTED;  Surgeon: Jennifer King MD;  [facility]    LAPAROSCOPY DIAGNOSTIC (GENERAL) N/A 10/2/2023     Procedure: Diagnostic Laparoscopyand Lavage;  Surgeon: Jennifer King MD;  [facility]    PROSTATECTOMY        THYROIDECTOMY, PARTIAL Left           Social History   Social History            Socioeconomic History    Marital status:        Spouse name: Not on file    Number of children: Not on file    Years of education: Not on file    Highest education level: Not on file   Occupational History    Not on file   Tobacco Use    Smoking status: Never       Passive exposure: Never    Smokeless tobacco: Never   Substance and Sexual Activity    Alcohol use: Not Currently       Comment: None since 6/23    Drug use: Never    Sexual activity: Not on file   Other Topics Concern    Not on file   Social History Narrative    Not on file      Social Determinants of Health      Financial Resource Strain: Not on file   Food Insecurity: Not on file   Transportation Needs: Not on file   Physical Activity: Not on file   Stress: Not on file   Social Connections: Not on file   Interpersonal Safety: Not on file   Housing Stability: Not on file         SUBJECTIVE   Alonso is doing  well. He is tolerating diet by mouth and hasn't used his tube in 6 weeks. He is maintaing his weight and his albumin is good.  He wants the tube out        From a personal perspective, he is here with his wife, Anna Marie.        IMPRESSION   73 year-old male status post minimally invasive esophagectomy for a T3N1M0 Siewert I esophageal cancer.       PLAN  I spent 45 min on the date of the encounter in chart review, patient visit, review of tests, documentation and/or discussion with other providers about the issues documented above. I reviewed the plan as follows:    J tube removed  Surveillance follow up with  oncologist  Will reach out to oncologist about his suitability for immunotherapy    All questions were answered and the patient and present family were in agreement with the plan.  I appreciate the opportunity to participate in the care of your patient and will keep you updated.  Sincerely,    Jennifer King MD

## 2024-01-11 NOTE — NURSING NOTE
"Oncology Rooming Note    January 11, 2024 10:15 AM   Alonso Rogel is a 73 year old male who presents for:    Chief Complaint   Patient presents with    Port Draw     Labs drawn via port by RN in lab.  VS taken    Oncology Clinic Visit     Malignant neoplasm of middle third of esophagus      Initial Vitals: /75   Pulse 62   Temp 98.1  F (36.7  C) (Oral)   Resp 16   Wt 98.3 kg (216 lb 12.8 oz)   SpO2 99%   BMI 29.82 kg/m   Estimated body mass index is 29.82 kg/m  as calculated from the following:    Height as of 10/23/23: 1.816 m (5' 11.5\").    Weight as of this encounter: 98.3 kg (216 lb 12.8 oz). Body surface area is 2.23 meters squared.  No Pain (0) Comment: Data Unavailable   No LMP for male patient.  Allergies reviewed: Yes  Medications reviewed: Yes    Medications: Medication refills not needed today.  Pharm[facility] entered into [facility]: [id number] [address] AT [facility] [address]  [address]    Frailty Screening:   Is the patient here for a new oncology consult visit in cancer care? 2. No      Clinical concerns:        Sil Patel              "

## 2024-01-12 ENCOUNTER — PATIENT OUTREACH (OUTPATIENT)
Dept: SURGERY | Facility: CLINIC | Age: 74
End: 2024-01-12
Payer: COMMERCIAL

## 2024-01-12 NOTE — PROGRESS NOTES
[facility]: Thoracic surgery                                                                                 An outside Epic message was sent to med onc Dr. Gutierrez updating him on 1/11/24 thoracic surgery visit (J tube removed, continue to follow up with onc, is there any role for immunotherapy for pt?).    Pt will follow up with thoracic surgery as needed.    Jessica Ku, RN BSN  Thoracic Surgery RN Care Coordinator  [phone]

## 2024-01-12 NOTE — PROGRESS NOTES
THORACIC SURGERY FOLLOW UP VISIT     I saw Mr. Rogel in follow-up today. The clinical summary follows:     PREOP DIAGNOSIS   Esophageal adenocarcinoma  NEODJUVANT THERAPY   Carboplatin, paclitaxel, external beam radiotherapy (completed: 08/04/2023)     COMPLICATIONS FROM NEOADJUVANT THERAPY  None  PROCEDURE   Esophagogastroscopy  Laparoscopic gastric conduit preparation and abdominal lymphadenectomy  Botulinum toxin injection into pylorus  RIGHT thoracoscopic esophageal mobilization with mediastinal lymphadenectomy and intrathoracic anastomosis  LEFT tube thoracostomy     DATE OF PROCEDURE  10/23/2023     HISTOPATHOLOGY   No residual viable tumor cells present, tumor bed is 3.5 cm. Surgical margins negative for dysplasia, intestinal metaplasia and malignancy. Intestinal metaplasia with high-grade dysplasia within tumor bed.     COMPLICATIONS  None          Past Medical History        Past Medical History:   Diagnosis Date    Anemia      Esophageal cancer (H)      History of prostate cancer      History of thyroid cancer      HLD (hyperlipidemia)      Neuropathy      SNHL (sensorineural hearing loss)           Past Surgical History         Past Surgical History:   Procedure Laterality Date    APPENDECTOMY        CRANIOTOMY        ESOPHAGECTOMY MINIMALLY INVASIVE N/A 10/23/2023     Procedure: MINIMALLY INVASIVE CECIL MARY ESOPHAGECTOMY, Esophagogastroduodenoscopy, Flexible Bronchoscopy, Right Video-Assisted Thoracic Surgery, CHEST TUBE PLACEMENT X2;  Surgeon: Jennifer King MD;  [facility]    ESOPHAGOSCOPY, GASTROSCOPY, DUODENOSCOPY (EGD), COMBINED N/A 10/2/2023     Procedure: Esophagoscopy, gastroscopy, duodenoscopy (EGD);  Surgeon: Jennifer King MD;  [facility]    LAPAROSCOPIC ASSISTED INSERTION TUBE JEJUNOSTOMY N/A 10/2/2023     Procedure: INSERTION, JEJUNOSTOMY TUBE, LAPAROSCOPY-ASSISTED;  Surgeon: Jennifer King MD;  [facility]    LAPAROSCOPY DIAGNOSTIC (GENERAL) N/A 10/2/2023      Procedure: Diagnostic Laparoscopyand Lavage;  Surgeon: Jennifer King MD;  [facility]    PROSTATECTOMY        THYROIDECTOMY, PARTIAL Left           Social History   Social History            Socioeconomic History    Marital status:        Spouse name: Not on file    Number of children: Not on file    Years of education: Not on file    Highest education level: Not on file   Occupational History    Not on file   Tobacco Use    Smoking status: Never       Passive exposure: Never    Smokeless tobacco: Never   Substance and Sexual Activity    Alcohol use: Not Currently       Comment: None since 6/23    Drug use: Never    Sexual activity: Not on file   Other Topics Concern    Not on file   Social History Narrative    Not on file      Social Determinants of Health      Financial Resource Strain: Not on file   Food Insecurity: Not on file   Transportation Needs: Not on file   Physical Activity: Not on file   Stress: Not on file   Social Connections: Not on file   Interpersonal Safety: Not on file   Housing Stability: Not on file         SUBJECTIVE   Alonso is doing  well. He is tolerating diet by mouth and hasn't used his tube in 6 weeks. He is maintaing his weight and his albumin is good.  He wants the tube out        From a personal perspective, he is here with his wife, Anna Marie.        IMPRESSION   73 year-old male status post minimally invasive esophagectomy for a T3N1M0 Siewert I esophageal cancer.       PLAN  I spent 45 min on the date of the encounter in chart review, patient visit, review of tests, documentation and/or discussion with other providers about the issues documented above. I reviewed the plan as follows:    J tube removed  Surveillance follow up with oncologist  Will reach out to oncologist about his suitability for immunotherapy    All questions were answered and the patient and present family were in agreement with the plan.  I appreciate the opportunity to participate in the care of your  patient and will keep you updated.  Sincerely,

## 2024-01-16 NOTE — PROGRESS NOTES
[facility]: Thoracic surgery                                                                           Clinic visit note from 1/11/24 Dr. King faxed to [facility] ([phone]). It was noted that pt's J tube was removed in clinic.    Jessica Ku RN BSN  Thoracic Surgery RN Care Coordinator  [phone]

## 2024-02-13 NOTE — ANESTHESIA POSTPROCEDURE EVALUATION
Patient: Alonso Rogel    Procedure: Procedure(s):  MINIMALLY INVASIVE CECIL MARY ESOPHAGECTOMY, Esophagogastroduodenoscopy, Flexible Bronchoscopy, Right Video-Assisted Thoracic Surgery, CHEST TUBE PLACEMENT X2       Anesthesia Type:  General    Note:  Disposition: Admission   Postop Pain Control: Uneventful            Sign Out: Well controlled pain   PONV: No   Neuro/Psych: Uneventful            Sign Out: Acceptable/Baseline neuro status   Airway/Respiratory: Uneventful            Sign Out: Acceptable/Baseline resp. status   CV/Hemodynamics: Uneventful            Sign Out: Acceptable CV status; No obvious hypovolemia; No obvious fluid overload   Other NRE: NONE   DID A NON-ROUTINE EVENT OCCUR? No           Last vitals:  Vitals Value Taken Time   /74 10/23/23 2000   Temp 36.1  C (97  F) 10/23/23 2000   Pulse 78 10/23/23 2000   Resp 12 10/23/23 2000   SpO2 94 % 10/23/23 2000       Electronically Signed By: Donald Snider MD  February 13, 2024  11:24 AM

## 2024-10-06 ENCOUNTER — HEALTH MAINTENANCE LETTER (OUTPATIENT)
Age: 74
End: 2024-10-06

## 2025-03-15 NOTE — PROGRESS NOTES
Prior Authorization Approval    Medication: PANTOPRAZOLE 2 MG/ML PO SUSP  PA Initiated: 11/1/2023  PA Type: Non-Formulary    Insurance: [employer] [place] - Phone [phone] Fax [phone]  Critical access hospital Key / Reference #: [id number]   Authorization Effective Dates: 11/1/2023 - 12/31/2023    Expected CoPay: $ 6.84  CoPay Card Eligible: No    Filling Pharmacy: [address]  Comments:  Never received fax determination letter. Approval confirmed with paid claim from pharmacy.    Jazmin Bandar  [facility] Liaison  Ph: [phone]  Fax: [phone]  Available on [employer] (learn more here)      Prior Authorization Initiated    Medication: PANTOPRAZOLE 2 MG/ML PO SUSP  Insurance Company: [employer] - Phone [phone] Fax [phone]  Filling Pharmacy: [address]  Start Date: 11/1/2023  Critical access hospital Key / Reference #: [id number]     [facility]  Ph: [phone]  Fax: [phone]  Available on [employer] (learn more here)    
TEDDY

## 2025-06-26 ENCOUNTER — TRANSCRIBE ORDERS (OUTPATIENT)
Dept: OTHER | Age: 75
End: 2025-06-26

## 2025-06-26 DIAGNOSIS — C15.9 LOCAL RECURRENCE OF MALIGNANT NEOPLASM OF ESOPHAGUS (H): ICD-10-CM

## 2025-06-26 DIAGNOSIS — C15.4 MALIGNANT NEOPLASM OF MIDDLE THIRD OF ESOPHAGUS (H): ICD-10-CM

## 2025-07-01 ENCOUNTER — LAB REQUISITION (OUTPATIENT)
Dept: LAB | Facility: CLINIC | Age: 75
End: 2025-07-01
Payer: COMMERCIAL

## 2025-07-01 PROCEDURE — 88321 CONSLTJ&REPRT SLD PREP ELSWR: CPT | Mod: GC | Performed by: PATHOLOGY

## 2025-07-02 LAB
PATH REPORT.COMMENTS IMP SPEC: ABNORMAL
PATH REPORT.COMMENTS IMP SPEC: YES
PATH REPORT.FINAL DX SPEC: ABNORMAL
PATH REPORT.GROSS SPEC: ABNORMAL
PATH REPORT.MICROSCOPIC SPEC OTHER STN: ABNORMAL
PATH REPORT.RELEVANT HX SPEC: ABNORMAL
PATH REPORT.RELEVANT HX SPEC: ABNORMAL
PATH REPORT.SITE OF ORIGIN SPEC: ABNORMAL

## 2025-07-02 NOTE — PROGRESS NOTES
THORACIC SURGERY FOLLOW UP VISIT     I saw Mr. Rogel in follow-up today. The clinical summary follows:    Patient is 73 year-old male status post minimally invasive esophagectomy (Gabriel gavino) for clinically T3N1M0 Siewert I esophageal cancer, stage pT0N0 post-resection. On 6/4/25 he underwent a CT C/A/P with evidence of a new paraesophageal soft tissue nodule/lymph node. On 6/18/25 he underwent a PET scan with evidence of nodule/lymph node at the right tracheoesophageal groove described at recent CT is moderately FDG avid and suspicious for metastatic disease with a tiny focal uptake at the posterior aspect of right lower lobe/posterior pleura. He subsequently underwent a FNA with results suspicious for malignancy. He is currently undergoing TEMPUS testing.      PREOP DIAGNOSIS   Esophageal adenocarcinoma    NEODJUVANT THERAPY   Carboplatin, paclitaxel, external beam radiotherapy (completed: 08/04/2023)     COMPLICATIONS FROM NEOADJUVANT THERAPY  None    PROCEDURE   Esophagogastroscopy  Laparoscopic gastric conduit preparation and abdominal lymphadenectomy  Botulinum toxin injection into pylorus  RIGHT thoracoscopic esophageal mobilization with mediastinal lymphadenectomy and intrathoracic anastomosis  LEFT tube thoracostomy     DATE OF PROCEDURE  10/23/2023     HISTOPATHOLOGY   Final Diagnosis   A. FINAL GASTRIC MARGIN, RESECTION:  - Unremarkable gastric wall  - No H. pylori-like organisms identified on routine staining  - Negative for intestinal metaplasia, dysplasia, and malignancy     B. ESOPHAGUS, PERIESOPHAGEAL TISSUE, RESECTION:  - Six lymph nodes, negative for metastatic carcinoma (0/6)     C. ESOPHAGUS AND STOMACH, ESOPHAGOGASTRECTOMY:  - No residual viable tumor cells present, tumor bed is (3.5 cm)  - Surgical margins negative for dysplasia, intestinal metaplasia, and malignancy  - Fifteen lymph nodes, negative for malignancy (0/15)  - Additional findings: Intestinal metaplasia with high-grade dysplasia  within tumor bed        COMPLICATIONS  None    INTERVAL STUDIES  06/04/2025: CT scan chest abdomen pelvis:1. New paraesophageal soft tissue nodule/lymph node, which in this setting is worrisome for disease progression. Recommend tissue sampling via endoscopic ultrasound.2. No other evidence of metastatic disease.    06/18/2025: PET scan:1. The nodule/lymph node at the right tracheoesophageal groove described at recent CT is moderately FDG avid and suspicious for metastatic disease.2. Tiny focal uptake at posterior aspect right lower lobe/posterior pleura.  This has no convincing CT correlate and is therefore of questionable significance but recommend CT follow-up when clinically appropriate to evaluate for developing nodule.         Past Medical History        Past Medical History:   Diagnosis Date    Anemia      Esophageal cancer (H)      History of prostate cancer      History of thyroid cancer      HLD (hyperlipidemia)      Neuropathy      SNHL (sensorineural hearing loss)           Past Surgical History         Past Surgical History:   Procedure Laterality Date    APPENDECTOMY        CRANIOTOMY        ESOPHAGECTOMY MINIMALLY INVASIVE N/A 10/23/2023     Procedure: MINIMALLY INVASIVE CECIL MARY ESOPHAGECTOMY, Esophagogastroduodenoscopy, Flexible Bronchoscopy, Right Video-Assisted Thoracic Surgery, CHEST TUBE PLACEMENT X2;  Surgeon: Jennifer King MD;  Location: [facility]    ESOPHAGOSCOPY, GASTROSCOPY, DUODENOSCOPY (EGD), COMBINED N/A 10/2/2023     Procedure: Esophagoscopy, gastroscopy, duodenoscopy (EGD);  Surgeon: Jennifer King MD;  Location: [facility] OR    LAPAROSCOPIC ASSISTED INSERTION TUBE JEJUNOSTOMY N/A 10/2/2023     Procedure: INSERTION, JEJUNOSTOMY TUBE, LAPAROSCOPY-ASSISTED;  Surgeon: Jennifer King MD;  Location: [facility]    LAPAROSCOPY DIAGNOSTIC (GENERAL) N/A 10/2/2023     Procedure: Diagnostic Laparoscopyand Lavage;  Surgeon: Jennifer King MD;  Location: [facility]    PROSTATECTOMY        THYROIDECTOMY, PARTIAL  Left           Social History   Social History            Socioeconomic History    Marital status:        Spouse name: Not on file    Number of children: Not on file    Years of education: Not on file    Highest education level: Not on file   Occupational History    Not on file   Tobacco Use    Smoking status: Never       Passive exposure: Never    Smokeless tobacco: Never   Substance and Sexual Activity    Alcohol use: Not Currently       Comment: None since 6/23    Drug use: Never    Sexual activity: Not on file   Other Topics Concern    Not on file   Social History Narrative    Not on file      Social Determinants of Health      Financial Resource Strain: Not on file   Food Insecurity: Not on file   Transportation Needs: Not on file   Physical Activity: Not on file   Stress: Not on file   Social Connections: Not on file   Interpersonal Safety: Not on file   Housing Stability: Not on file         SUBJECTIVE   Patient reports he has overall been doing well since his surgery, he has been struggling intermittently with dumping syndrome. He will go one week with regular bowel movements and then have recurrent diarrhea. He has been very active and mows his own lawn. Notes that he has recently seen his urologist and was found to have recurrence of his prostate cancer. Any treatment of the prostate cancer will be on hold until a final plan is made for his recurrence of his esophageal cancer. States that when he most recently saw his oncologist they were planning on having a port placed and starting chemotherapy.      From a personal perspective, he is here with his wife Anna Mraie.        PHYSICAL EXAM  General: sitting up comfortably in a chair, awake and alert  CV: normal heart rate, normotensive  Respiratory: CTAB, no rales or rhonchi  Chest: well healed surgical scars  Abdomen: soft, non-distended, non-tender to palpation, reducible ventral hernia  Extremities: warm and well perfused    IMPRESSION   Patient is 73  year-old male status post minimally invasive esophagectomy ([place] gaivno) for clinically T3N1M0 Siewert I esophageal cancer, stage pT0N0 post-resection now with concern for recurrence.      PLAN  I spent 30 min on the date of the encounter in chart review, patient visit, review of tests, documentation and/or discussion with other providers about the issues documented above. I reviewed the plan as follows:    We discussed that his was more of a regional rather than local recurrence as it was in te lymph node and not the anastomosis. Systemic treatment would be the best option.  No surgical intervention indicated at this time. Will discuss at multidisciplinary conference.   Follow up on Monday with his oncologist  All questions were answered and the patient and present family were in agreement with the plan.  I appreciate the opportunity to participate in the care of your patient and will keep you updated.  Sincerely,

## 2025-07-03 ENCOUNTER — ONCOLOGY VISIT (OUTPATIENT)
Dept: SURGERY | Facility: CLINIC | Age: 75
End: 2025-07-03
Payer: COMMERCIAL

## 2025-07-03 VITALS
BODY MASS INDEX: 28.21 KG/M2 | WEIGHT: 205.1 LBS | OXYGEN SATURATION: 99 % | TEMPERATURE: 97.9 F | HEART RATE: 59 BPM | RESPIRATION RATE: 16 BRPM | DIASTOLIC BLOOD PRESSURE: 73 MMHG | SYSTOLIC BLOOD PRESSURE: 122 MMHG

## 2025-07-03 DIAGNOSIS — C15.4 MALIGNANT NEOPLASM OF MIDDLE THIRD OF ESOPHAGUS (H): Primary | ICD-10-CM

## 2025-07-03 PROCEDURE — G0463 HOSPITAL OUTPT CLINIC VISIT: HCPCS | Performed by: STUDENT IN AN ORGANIZED HEALTH CARE EDUCATION/TRAINING PROGRAM

## 2025-07-03 RX ORDER — PANTOPRAZOLE SODIUM 40 MG/1
40 TABLET, DELAYED RELEASE ORAL DAILY
COMMUNITY
Start: 2025-04-08 | End: 2026-04-03

## 2025-07-03 ASSESSMENT — PAIN SCALES - GENERAL: PAINLEVEL_OUTOF10: NO PAIN (0)

## 2025-07-03 NOTE — LETTER
7/3/2025      Alonso Rogel  [id number] [facility] [address]      Dear Colleague,    Thank you for referring your patient, Alonso Rogel, to the [facility]. Please see a copy of my visit note below.    THORACIC SURGERY FOLLOW UP VISIT     I saw Mr. Rogel in follow-up today. The clinical summary follows:    Patient is 73 year-old male status post minimally invasive esophagectomy ([place]ym[place] gavino) for clinically T3N1M0 Siewert I esophageal cancer, stage pT0N0 post-resection. On 6/4/25 he underwent a CT C/A/P with evidence of a new paraesophageal soft tissue nodule/lymph node. On 6/18/25 he underwent a PET scan with evidence of nodule/lymph node at the right tracheoesophageal groove described at recent CT is moderately FDG avid and suspicious for metastatic disease with a tiny focal uptake at the posterior aspect of right lower lobe/posterior pleura. He subsequently underwent a FNA with results suspicious for malignancy. He is currently undergoing TEMPUS testing.      PREOP DIAGNOSIS   Esophageal adenocarcinoma    NEODJUVANT THERAPY   Carboplatin, paclitaxel, external beam radiotherapy (completed: 08/04/2023)     COMPLICATIONS FROM NEOADJUVANT THERAPY  None    PROCEDURE   Esophagogastroscopy  Laparoscopic gastric conduit preparation and abdominal lymphadenectomy  Botulinum toxin injection into pylorus  RIGHT thoracoscopic esophageal mobilization with mediastinal lymphadenectomy and intrathoracic anastomosis  LEFT tube thoracostomy     DATE OF PROCEDURE  10/23/2023     HISTOPATHOLOGY   Final Diagnosis   A. FINAL GASTRIC MARGIN, RESECTION:  - Unremarkable gastric wall  - No H. pylori-like organisms identified on routine staining  - Negative for intestinal metaplasia, dysplasia, and malignancy     B. ESOPHAGUS, PERIESOPHAGEAL TISSUE, RESECTION:  - Six lymph nodes, negative for metastatic carcinoma (0/6)     C. ESOPHAGUS AND STOMACH, ESOPHAGOGASTRECTOMY:  - No residual viable tumor  cells present, tumor bed is (3.5 cm)  - Surgical margins negative for dysplasia, intestinal metaplasia, and malignancy  - Fifteen lymph nodes, negative for malignancy (0/15)  - Additional findings: Intestinal metaplasia with high-grade dysplasia within tumor bed        COMPLICATIONS  None    INTERVAL STUDIES  06/04/2025: CT scan chest abdomen pelvis:1. New paraesophageal soft tissue nodule/lymph node, which in this setting is worrisome for disease progression. Recommend tissue sampling via endoscopic ultrasound.2. No other evidence of metastatic disease.    06/18/2025: PET scan:1. The nodule/lymph node at the right tracheoesophageal groove described at recent CT is moderately FDG avid and suspicious for metastatic disease.2. Tiny focal uptake at posterior aspect right lower lobe/posterior pleura.  This has no convincing CT correlate and is therefore of questionable significance but recommend CT follow-up when clinically appropriate to evaluate for developing nodule.         Past Medical History        Past Medical History:   Diagnosis Date     Anemia       Esophageal cancer (H)       History of prostate cancer       History of thyroid cancer       HLD (hyperlipidemia)       Neuropathy       SNHL (sensorineural hearing loss)           Past Surgical History         Past Surgical History:   Procedure Laterality Date     APPENDECTOMY         CRANIOTOMY         ESOPHAGECTOMY MINIMALLY INVASIVE N/A 10/23/2023     Procedure: MINIMALLY INVASIVE CECIL MARY ESOPHAGECTOMY, Esophagogastroduodenoscopy, Flexible Bronchoscopy, Right Video-Assisted Thoracic Surgery, CHEST TUBE PLACEMENT X2;  Surgeon: Jennifer King MD;  Location: [facility]     ESOPHAGOSCOPY, GASTROSCOPY, DUODENOSCOPY (EGD), COMBINED N/A 10/2/2023     Procedure: Esophagoscopy, gastroscopy, duodenoscopy (EGD);  Surgeon: Jennifer King MD;  Location: [facility]     LAPAROSCOPIC ASSISTED INSERTION TUBE JEJUNOSTOMY N/A 10/2/2023     Procedure: INSERTION, JEJUNOSTOMY TUBE,  LAPAROSCOPY-ASSISTED;  Surgeon: Jennifer King MD;  Location: [facility]     LAPAROSCOPY DIAGNOSTIC (GENERAL) N/A 10/2/2023     Procedure: Diagnostic Laparoscopyand Lavage;  Surgeon: Jennifer King MD;  Location: [facility]     PROSTATECTOMY         THYROIDECTOMY, PARTIAL Left           Social History   Social History            Socioeconomic History     Marital status:        Spouse name: Not on file     Number of children: Not on file     Years of education: Not on file     Highest education level: Not on file   Occupational History     Not on file   Tobacco Use     Smoking status: Never       Passive exposure: Never     Smokeless tobacco: Never   Substance and Sexual Activity     Alcohol use: Not Currently       Comment: None since 6/23     Drug use: Never     Sexual activity: Not on file   Other Topics Concern     Not on file   Social History Narrative     Not on file      Social Determinants of Health      Financial Resource Strain: Not on file   Food Insecurity: Not on file   Transportation Needs: Not on file   Physical Activity: Not on file   Stress: Not on file   Social Connections: Not on file   Interpersonal Safety: Not on file   Housing Stability: Not on file         SUBJECTIVE   Patient reports he has overall been doing well since his surgery, he has been struggling intermittently with dumping syndrome. He will go one week with regular bowel movements and then have recurrent diarrhea. He has been very active and mows his own lawn. Notes that he has recently seen his urologist and was found to have recurrence of his prostate cancer. Any treatment of the prostate cancer will be on hold until a final plan is made for his recurrence of his esophageal cancer. States that when he most recently saw his oncologist they were planning on having a port placed and starting chemotherapy.      From a personal perspective, he is here with his wife Anna Marie.        PHYSICAL EXAM  General: sitting up comfortably in a chair,  awake and alert  CV: normal heart rate, normotensive  Respiratory: CTAB, no rales or rhonchi  Chest: well healed surgical scars  Abdomen: soft, non-distended, non-tender to palpation, reducible ventral hernia  Extremities: warm and well perfused    IMPRESSION   Patient is 73 year-old male status post minimally invasive esophagectomy (Gabriel gavino) for clinically T3N1M0 Siewert I esophageal cancer, stage pT0N0 post-resection now with concern for recurrence.      PLAN  I spent 30 min on the date of the encounter in chart review, patient visit, review of tests, documentation and/or discussion with other providers about the issues documented above. I reviewed the plan as follows:    We discussed that his was more of a regional rather than local recurrence as it was in te lymph node and not the anastomosis. Systemic treatment would be the best option.  No surgical intervention indicated at this time. Will discuss at multidisciplinary conference.   Follow up on Monday with his oncologist  All questions were answered and the patient and present family were in agreement with the plan.  I appreciate the opportunity to participate in the care of your patient and will keep you updated.  Sincerely,    Again, thank you for allowing me to participate in the care of your patient.        Sincerely,        Jennifer King MD    Electronically signed

## 2025-07-03 NOTE — NURSING NOTE
"Oncology Rooming Note    July 3, 2025 10:55 AM   Alonso Rogel is a 74 year old male who presents for:    Chief Complaint   Patient presents with    Oncology Clinic Visit     Malignant neoplasm of middle third of esophagus       Initial Vitals: /73 (BP Location: Right arm, Patient Position: Sitting, Cuff Size: Adult Regular)   Pulse 59   Temp 97.9  F (36.6  C) (Oral)   Resp 16   Wt 93 kg (205 lb 1.6 oz)   SpO2 99%   BMI 28.21 kg/m   Estimated body mass index is 28.21 kg/m  as calculated from the following:    Height as of 10/23/23: 1.816 m (5' 11.5\").    Weight as of this encounter: 93 kg (205 lb 1.6 oz). Body surface area is 2.17 meters squared.  No Pain (0) Comment: Data Unavailable   No LMP for male patient.  Allergies reviewed: Yes  Medications reviewed: Yes    Medications: Medication refills not needed today.  Pharm[facility] into [id number] [address] AT [facility] OF  [address]    Frailty Screening:   Is the patient here for a new oncology consult visit in cancer care? 2. No    PHQ9:  Did this patient require a PHQ9?: No      Clinical concerns: none       Gwendolyn Adams"

## 2025-07-08 ENCOUNTER — TUMOR CONFERENCE (OUTPATIENT)
Dept: ONCOLOGY | Facility: CLINIC | Age: 75
End: 2025-07-08
Payer: COMMERCIAL

## 2025-07-08 NOTE — TUMOR CONFERENCE
Tumor Conference Information           Documentation / Disclaimer Cancer Tumor Board Note  Cancer tumor board recommendations do not override what is determined to be reasonable care and treatment, which is dependent on the circumstances of a patient's case; the patient's medical, social, and personal concerns; and the clinical judgment of the oncologist [physician].    Group discussed 4R lymph node recurrence, no recurrence at anastomosis. Pt is not a surgical candidate. Pt could do radiation, as the primary tumor was mid-esophageal. It is also appropriate to do systemic therapy. Pt plans to see Hayden to further investigate proton beam. Group consensus that radiation is an option, systemic therapy is also appropriate, pt can continue to follow with his medical oncology team at [facility] to decide on treatment course.    Jessica Ku RN BSN  Thoracic Surgery RN Care Coordinator  Phone: [phone]

## (undated) DEVICE — INTR PEELAWAY 18FRX15.5CM G06444 PLVW-18.0-38

## (undated) DEVICE — SU SILK 2-0 SH 30" K833H

## (undated) DEVICE — JELLY LUBRICATING SURGILUBE 2OZ TUBE

## (undated) DEVICE — ENDO BRUSH BILIARY CYTOLOGY 8FRX200CM M00545000

## (undated) DEVICE — ENDO CAP AND TUBING STERILE FOR ENDOGATOR  100130

## (undated) DEVICE — WIPES FOLEY CARE SURESTEP PROVON DFC100

## (undated) DEVICE — DRAIN PENROSE 3/8X18" LATEX 30416-038

## (undated) DEVICE — SYR BULB IRRIG DOVER 60 ML LATEX FREE 67000

## (undated) DEVICE — ESU GROUND PAD ADULT W/CORD E7507

## (undated) DEVICE — DRSG DRAIN 2X2" 7087

## (undated) DEVICE — TAPE MEDIPORE 4"X2YD 2864

## (undated) DEVICE — TUBING SUCTION 10'X3/16" N510

## (undated) DEVICE — BLADE CLIPPER SGL USE 9680

## (undated) DEVICE — CONNECTOR BLAKE DRAIN SGL BCC1

## (undated) DEVICE — SU VICRYL 0 UR-6 27" J603H

## (undated) DEVICE — TIES BANDING T50R

## (undated) DEVICE — ESU PENCIL SMOKE EVAC W/ROCKER SWITCH 0703-047-000

## (undated) DEVICE — DRAPE IOBAN INCISE 23X17" 6650EZ

## (undated) DEVICE — LINEN TOWEL PACK X6 WHITE 5487

## (undated) DEVICE — LINEN GOWN XLG 5407

## (undated) DEVICE — SU VICRYL 3-0 SH 27" J316H

## (undated) DEVICE — ENDO SCOPE WARMER SEAL  C3101

## (undated) DEVICE — SU DERMABOND ADVANCED .7ML DNX12

## (undated) DEVICE — DRSG PRIMAPORE 02X3" 7133

## (undated) DEVICE — ENDO VALVE SUCTION BRONCH EVIS MAJ-209

## (undated) DEVICE — CONNECTOR FUNNEL TRANSITION ENFIT F00106

## (undated) DEVICE — SYR 10ML LL W/O NDL 302995

## (undated) DEVICE — DEVICE SUTURE GRASPER TROCAR CLOSURE 14GA PMITCSG

## (undated) DEVICE — SOL ADH LIQUID BENZOIN SWAB 0.6ML C1544

## (undated) DEVICE — ENDO BITE BLOCK ADULT OMNI-BLOC

## (undated) DEVICE — STPL ENDO RELOAD 60MM MEDIUM THICK PURPLE EGIA60AMT

## (undated) DEVICE — SU VICRYL 4-0 PS-2 18" UND J496H

## (undated) DEVICE — CATH VA INTR 14FRX14CM KIT LATEX 612613

## (undated) DEVICE — Device

## (undated) DEVICE — STPL ENDO RELOAD SIG GIA BLACK 45MM X-TRA THICK SIG45AXT

## (undated) DEVICE — DRAIN CHEST TUBE 24FR STR 8024

## (undated) DEVICE — LINEN TOWEL PACK X5 5464

## (undated) DEVICE — ESU PENCIL W/ROCKER SWITCH BLADE HOLSTER E2350HDB

## (undated) DEVICE — SU SILK 0 SH 30" K834H

## (undated) DEVICE — SUCTION MANIFOLD NEPTUNE 2 SYS 4 PORT 0702-020-000

## (undated) DEVICE — KIT CONNECTOR FOR OLYMPUS ENDOSCOPES DEFENDO 100310

## (undated) DEVICE — SU MONOCRYL 4-0 PS-2 27" UND Y426H

## (undated) DEVICE — SPONGE RAY-TEC 4X8" 7318

## (undated) DEVICE — ENDO CLIP CARTIRDGE K2 MED/LG 10 1112

## (undated) DEVICE — DRSG STERI STRIP 1/2X4" R1547

## (undated) DEVICE — ESU LIGASURE MARYLAND LAPAROSCOPIC SLR/DVDR 5MMX37CM LF1937

## (undated) DEVICE — SU VICRYL 2-0 SH 27" UND J417H

## (undated) DEVICE — SYR ENTERAL W/ENFIT CONNECTOR PURPLE 60ML 460SE

## (undated) DEVICE — SU TICRON 2 GS-21DA 36" 3146-81

## (undated) DEVICE — SOL NACL 0.9% IRRIG 1000ML BOTTLE 2F7124

## (undated) DEVICE — WIRE GUIDE AMPLATZ SUPER STIFF 0.035"X180CM 46-501

## (undated) DEVICE — SPONGE DOUBLE 1.25" W/STRING 23275-690

## (undated) DEVICE — DRAIN SYSTEM ENTERAL W/ENFIT CONNECTORS 250ML EDS-250

## (undated) DEVICE — PACK GOWN 3/PK DISP XL SBA32GPFCB

## (undated) DEVICE — SU MONOCRYL 4-0 PS-2 18" UND Y496G

## (undated) DEVICE — ENDO TUBING CO2 SMARTCAP STERILE DISP 100145CO2EXT

## (undated) DEVICE — GOWN IMPERVIOUS BREATHABLE SMART XLG 89045

## (undated) DEVICE — SOL WATER IRRIG 1000ML BOTTLE 2F7114

## (undated) DEVICE — GLOVE BIOGEL PI MICRO SZ 6.5 48565

## (undated) DEVICE — SYR PISTON URETHRAL 60ML 68000

## (undated) DEVICE — STPL ENDO HANDLE GIA ULTRA UNIVERSAL STD EGIAUSTND

## (undated) DEVICE — PREP CHLORAPREP 26ML TINTED HI-LITE ORANGE 930815

## (undated) DEVICE — TUBING SMOKE EVAC PNEUMOCLEAR HIGH FLOW 0620050250

## (undated) DEVICE — INTR PEELAWAY 14FRX15CM G06496 PLVW-14.0-38

## (undated) DEVICE — SOL NACL 0.9% 10ML VIAL 0409-4888-02

## (undated) DEVICE — SU SILK 3-0 SH CR 8X18" C013D

## (undated) DEVICE — APPLICATORS COTTON TIP 6"X2 STERILE LF C15053-006

## (undated) DEVICE — DRAPE SHEET MED 44X70" 9355

## (undated) DEVICE — LUBRICANT INST KIT ENDO-LUBE 220-90

## (undated) DEVICE — ENDO DISSECTOR KITTNER CIGARETTE ROLL4"X4" 15505/25

## (undated) DEVICE — ENDO TROCAR FIRST ENTRY KII FIOS Z-THRD 12X100MM CTF73

## (undated) DEVICE — CATH PLUG W/CAP 000076

## (undated) DEVICE — SU VICRYL 3-0 SH 27" UND J416H

## (undated) DEVICE — TUBE JEJUNOSTOMY ENFIT 16FR 8200-16

## (undated) DEVICE — SYR 30ML SLIP TIP W/O NDL 302833

## (undated) DEVICE — STPL POWERED ECHELON 45MM PSEE45A

## (undated) DEVICE — ESU ELEC BLADE 2.75" COATED/INSULATED E1455

## (undated) DEVICE — DRAPE LAP W/ARMBOARD 29410

## (undated) DEVICE — SU SILK 0 TIE 6X30" A306H

## (undated) DEVICE — DRSG PRIMAPORE 03 1/8X6" 66000318

## (undated) DEVICE — ENDO TROCAR OPTICAL ACCESS KII Z-THRD 12X60MM C0R83

## (undated) DEVICE — NDL SPINAL 22GA 7" QUINCKE 405149

## (undated) DEVICE — LINEN TOWEL PACK X30 5481

## (undated) DEVICE — SU PLEDGET SOFT TFE 13MMX7MMX1.5MM D7044

## (undated) DEVICE — SUCTION DRY CHEST DRAIN OASIS 3600-100

## (undated) DEVICE — WIRE GUIDE AMPLATZ SUPER STIFF 0.035"X260CM M001465261

## (undated) DEVICE — KIT ENDO FIRST STEP DISINFECTANT 200ML W/POUCH EP-4

## (undated) DEVICE — DEVICE SUTURE PASSER 14GA WECK EFX EFXSP2

## (undated) DEVICE — DRAPE SHEET REV FOLD 3/4 9349

## (undated) DEVICE — ENDO VALVE BX EVIS MAJ-210

## (undated) DEVICE — NDL COUNTER 20CT 31142493

## (undated) DEVICE — GUIDEWIRE AMPLATZ SUPER STIFF 0.035"X180CM M001465250

## (undated) DEVICE — ENDO SYSTEM WATER BOTTLE & TUBING W/CO2 FILTER 00711549

## (undated) DEVICE — ENDO SEALING CAP SMARTCAP

## (undated) DEVICE — CONNECTOR ENFIT KANGAROO TRANSITION 7650000

## (undated) DEVICE — CATH TRAY FOLEY SURESTEP 16FR W/URNE MTR STLK LATEX A303316A

## (undated) DEVICE — STPL ENDO ARTICULATING 45MM EC45A

## (undated) DEVICE — STPL ENDO RELOAD SIG GIA CVD TIP TAN 30MM MED SIG30CTAVM

## (undated) DEVICE — DRSG TEGADERM 4X4 3/4" 1626W

## (undated) DEVICE — BAG URINARY DRAIN 4000ML LF 153509

## (undated) DEVICE — STPL ENDO RELOAD 45MM MEDIUM THICK PURPLE EGIA45AMT

## (undated) DEVICE — ESU ELEC BLADE 6" COATED/INSULATED E1455-6

## (undated) DEVICE — DRAIN JACKSON PRATT ROUND SIL 19FR W/TROCAR LF JP-2232

## (undated) DEVICE — NDL INSUFFLATION 13GA 120MM C2201

## (undated) DEVICE — CUP AND LID 2PK 2OZ STERILE  SSK9006A

## (undated) RX ORDER — HYDROMORPHONE HCL IN WATER/PF 6 MG/30 ML
PATIENT CONTROLLED ANALGESIA SYRINGE INTRAVENOUS
Status: DISPENSED
Start: 2023-10-23

## (undated) RX ORDER — ENOXAPARIN SODIUM 100 MG/ML
INJECTION SUBCUTANEOUS
Status: DISPENSED
Start: 2023-10-02

## (undated) RX ORDER — ONDANSETRON 2 MG/ML
INJECTION INTRAMUSCULAR; INTRAVENOUS
Status: DISPENSED
Start: 2023-10-02

## (undated) RX ORDER — FENTANYL CITRATE 50 UG/ML
INJECTION, SOLUTION INTRAMUSCULAR; INTRAVENOUS
Status: DISPENSED
Start: 2023-10-23

## (undated) RX ORDER — FENTANYL CITRATE 50 UG/ML
INJECTION, SOLUTION INTRAMUSCULAR; INTRAVENOUS
Status: DISPENSED
Start: 2023-10-02

## (undated) RX ORDER — PROPOFOL 10 MG/ML
INJECTION, EMULSION INTRAVENOUS
Status: DISPENSED
Start: 2023-10-02

## (undated) RX ORDER — HYDROMORPHONE HYDROCHLORIDE 1 MG/ML
INJECTION, SOLUTION INTRAMUSCULAR; INTRAVENOUS; SUBCUTANEOUS
Status: DISPENSED
Start: 2023-10-23

## (undated) RX ORDER — ENOXAPARIN SODIUM 100 MG/ML
INJECTION SUBCUTANEOUS
Status: DISPENSED
Start: 2023-10-23

## (undated) RX ORDER — FENTANYL CITRATE-0.9 % NACL/PF 10 MCG/ML
PLASTIC BAG, INJECTION (ML) INTRAVENOUS
Status: DISPENSED
Start: 2023-10-23

## (undated) RX ORDER — GLYCOPYRROLATE 0.2 MG/ML
INJECTION, SOLUTION INTRAMUSCULAR; INTRAVENOUS
Status: DISPENSED
Start: 2023-10-02

## (undated) RX ORDER — BUPIVACAINE HYDROCHLORIDE AND EPINEPHRINE 2.5; 5 MG/ML; UG/ML
INJECTION, SOLUTION EPIDURAL; INFILTRATION; INTRACAUDAL; PERINEURAL
Status: DISPENSED
Start: 2023-10-02

## (undated) RX ORDER — DEXAMETHASONE SODIUM PHOSPHATE 4 MG/ML
INJECTION, SOLUTION INTRA-ARTICULAR; INTRALESIONAL; INTRAMUSCULAR; INTRAVENOUS; SOFT TISSUE
Status: DISPENSED
Start: 2023-10-02

## (undated) RX ORDER — INDOCYANINE GREEN AND WATER 25 MG
KIT INJECTION
Status: DISPENSED
Start: 2023-10-23

## (undated) RX ORDER — SODIUM CHLORIDE, SODIUM LACTATE, POTASSIUM CHLORIDE, CALCIUM CHLORIDE 600; 310; 30; 20 MG/100ML; MG/100ML; MG/100ML; MG/100ML
INJECTION, SOLUTION INTRAVENOUS
Status: DISPENSED
Start: 2023-10-23

## (undated) RX ORDER — CEFAZOLIN SODIUM 1 G/3ML
INJECTION, POWDER, FOR SOLUTION INTRAMUSCULAR; INTRAVENOUS
Status: DISPENSED
Start: 2023-10-23

## (undated) RX ORDER — ONDANSETRON 2 MG/ML
INJECTION INTRAMUSCULAR; INTRAVENOUS
Status: DISPENSED
Start: 2023-10-23

## (undated) RX ORDER — CLINDAMYCIN PHOSPHATE 900 MG/50ML
INJECTION, SOLUTION INTRAVENOUS
Status: DISPENSED
Start: 2023-10-23

## (undated) RX ORDER — ESMOLOL HYDROCHLORIDE 10 MG/ML
INJECTION INTRAVENOUS
Status: DISPENSED
Start: 2023-10-23

## (undated) RX ORDER — EPHEDRINE SULFATE 50 MG/ML
INJECTION, SOLUTION INTRAMUSCULAR; INTRAVENOUS; SUBCUTANEOUS
Status: DISPENSED
Start: 2023-10-23

## (undated) RX ORDER — FENTANYL CITRATE-0.9 % NACL/PF 10 MCG/ML
PLASTIC BAG, INJECTION (ML) INTRAVENOUS
Status: DISPENSED
Start: 2023-10-02

## (undated) RX ORDER — ACETAMINOPHEN 325 MG/1
TABLET ORAL
Status: DISPENSED
Start: 2023-10-02

## (undated) RX ORDER — DEXAMETHASONE SODIUM PHOSPHATE 4 MG/ML
INJECTION, SOLUTION INTRA-ARTICULAR; INTRALESIONAL; INTRAMUSCULAR; INTRAVENOUS; SOFT TISSUE
Status: DISPENSED
Start: 2023-10-23

## (undated) RX ORDER — PROPOFOL 10 MG/ML
INJECTION, EMULSION INTRAVENOUS
Status: DISPENSED
Start: 2023-10-23